# Patient Record
Sex: MALE | Race: WHITE | HISPANIC OR LATINO | Employment: FULL TIME | URBAN - METROPOLITAN AREA
[De-identification: names, ages, dates, MRNs, and addresses within clinical notes are randomized per-mention and may not be internally consistent; named-entity substitution may affect disease eponyms.]

---

## 2017-02-12 ENCOUNTER — HOSPITAL ENCOUNTER (INPATIENT)
Facility: HOSPITAL | Age: 22
LOS: 2 days | Discharge: HOME | End: 2017-02-14
Attending: EMERGENCY MEDICINE | Admitting: INTERNAL MEDICINE

## 2017-02-12 ENCOUNTER — APPOINTMENT (OUTPATIENT)
Dept: CT IMAGING | Facility: HOSPITAL | Age: 22
End: 2017-02-12

## 2017-02-12 DIAGNOSIS — B37.42 CANDIDAL BALANITIS: ICD-10-CM

## 2017-02-12 DIAGNOSIS — E11.65 TYPE 2 DIABETES MELLITUS WITH HYPERGLYCEMIA, WITHOUT LONG-TERM CURRENT USE OF INSULIN (HCC): Primary | ICD-10-CM

## 2017-02-12 DIAGNOSIS — N39.0 URINARY TRACT INFECTION WITHOUT HEMATURIA, SITE UNSPECIFIED: ICD-10-CM

## 2017-02-12 DIAGNOSIS — N47.1 PHIMOSIS: ICD-10-CM

## 2017-02-12 DIAGNOSIS — I88.9 INGUINAL LYMPHADENITIS: ICD-10-CM

## 2017-02-12 DIAGNOSIS — L03.90 CELLULITIS, UNSPECIFIED CELLULITIS SITE: ICD-10-CM

## 2017-02-12 PROBLEM — N30.00 ACUTE CYSTITIS WITHOUT HEMATURIA: Status: ACTIVE | Noted: 2017-02-12

## 2017-02-12 PROBLEM — N48.1 BALANITIS: Status: ACTIVE | Noted: 2017-02-12

## 2017-02-12 PROBLEM — Z91.199 MEDICALLY NONCOMPLIANT: Status: ACTIVE | Noted: 2017-02-12

## 2017-02-12 LAB
ALBUMIN SERPL-MCNC: 4.5 G/DL (ref 3.2–4.8)
ALBUMIN/GLOB SERPL: 1.4 G/DL (ref 1.5–2.5)
ALP SERPL-CCNC: 104 U/L (ref 25–100)
ALT SERPL W P-5'-P-CCNC: 31 U/L (ref 7–40)
ANION GAP SERPL CALCULATED.3IONS-SCNC: 5 MMOL/L (ref 3–11)
AST SERPL-CCNC: 21 U/L (ref 0–33)
BACTERIA UR QL AUTO: ABNORMAL /HPF
BASOPHILS # BLD AUTO: 0.01 10*3/MM3 (ref 0–0.2)
BASOPHILS NFR BLD AUTO: 0.3 % (ref 0–1)
BILIRUB SERPL-MCNC: 0.5 MG/DL (ref 0.3–1.2)
BILIRUB UR QL STRIP: NEGATIVE
BUN BLD-MCNC: 11 MG/DL (ref 9–23)
BUN/CREAT SERPL: 15.7 (ref 7–25)
CALCIUM SPEC-SCNC: 10.6 MG/DL (ref 8.7–10.4)
CHLORIDE SERPL-SCNC: 97 MMOL/L (ref 99–109)
CLARITY UR: CLEAR
CO2 SERPL-SCNC: 33 MMOL/L (ref 20–31)
COLOR UR: YELLOW
CREAT BLD-MCNC: 0.7 MG/DL (ref 0.6–1.3)
DEPRECATED RDW RBC AUTO: 42.3 FL (ref 37–54)
EOSINOPHIL # BLD AUTO: 0.01 10*3/MM3 (ref 0.1–0.3)
EOSINOPHIL NFR BLD AUTO: 0.3 % (ref 0–3)
ERYTHROCYTE [DISTWIDTH] IN BLOOD BY AUTOMATED COUNT: 13 % (ref 11.3–14.5)
GFR SERPL CREATININE-BSD FRML MDRD: 144 ML/MIN/1.73
GLOBULIN UR ELPH-MCNC: 3.3 GM/DL
GLUCOSE BLD-MCNC: 461 MG/DL (ref 70–100)
GLUCOSE BLDC GLUCOMTR-MCNC: 103 MG/DL (ref 70–130)
GLUCOSE BLDC GLUCOMTR-MCNC: 113 MG/DL (ref 70–130)
GLUCOSE BLDC GLUCOMTR-MCNC: 190 MG/DL (ref 70–130)
GLUCOSE BLDC GLUCOMTR-MCNC: 193 MG/DL (ref 70–130)
GLUCOSE BLDC GLUCOMTR-MCNC: 224 MG/DL (ref 70–130)
GLUCOSE BLDC GLUCOMTR-MCNC: 248 MG/DL (ref 70–130)
GLUCOSE BLDC GLUCOMTR-MCNC: 288 MG/DL (ref 70–130)
GLUCOSE UR STRIP-MCNC: ABNORMAL MG/DL
HCT VFR BLD AUTO: 41.2 % (ref 38.9–50.9)
HGB BLD-MCNC: 14.3 G/DL (ref 13.1–17.5)
HGB UR QL STRIP.AUTO: ABNORMAL
HYALINE CASTS UR QL AUTO: ABNORMAL /LPF
IMM GRANULOCYTES # BLD: 0.03 10*3/MM3 (ref 0–0.03)
IMM GRANULOCYTES NFR BLD: 1 % (ref 0–0.6)
KETONES UR QL STRIP: ABNORMAL
LEUKOCYTE ESTERASE UR QL STRIP.AUTO: ABNORMAL
LYMPHOCYTES # BLD AUTO: 0.96 10*3/MM3 (ref 0.6–4.8)
LYMPHOCYTES NFR BLD AUTO: 31.5 % (ref 24–44)
MCH RBC QN AUTO: 31.4 PG (ref 27–31)
MCHC RBC AUTO-ENTMCNC: 34.7 G/DL (ref 32–36)
MCV RBC AUTO: 90.4 FL (ref 80–99)
MONOCYTES # BLD AUTO: 0.46 10*3/MM3 (ref 0–1)
MONOCYTES NFR BLD AUTO: 15.1 % (ref 0–12)
NEUTROPHILS # BLD AUTO: 1.58 10*3/MM3 (ref 1.5–8.3)
NEUTROPHILS NFR BLD AUTO: 51.8 % (ref 41–71)
NITRITE UR QL STRIP: NEGATIVE
PH UR STRIP.AUTO: 6.5 [PH] (ref 5–8)
PLATELET # BLD AUTO: 184 10*3/MM3 (ref 150–450)
PMV BLD AUTO: 10.5 FL (ref 6–12)
POTASSIUM BLD-SCNC: 4.3 MMOL/L (ref 3.5–5.5)
PROT SERPL-MCNC: 7.8 G/DL (ref 5.7–8.2)
PROT UR QL STRIP: NEGATIVE
RBC # BLD AUTO: 4.56 10*6/MM3 (ref 4.2–5.76)
RBC # UR: ABNORMAL /HPF
REF LAB TEST METHOD: ABNORMAL
SODIUM BLD-SCNC: 135 MMOL/L (ref 132–146)
SP GR UR STRIP: 1.01 (ref 1–1.03)
SQUAMOUS #/AREA URNS HPF: ABNORMAL /HPF
UROBILINOGEN UR QL STRIP: ABNORMAL
WBC NRBC COR # BLD: 3.05 10*3/MM3 (ref 4.5–13.5)
WBC UR QL AUTO: ABNORMAL /HPF

## 2017-02-12 PROCEDURE — 87077 CULTURE AEROBIC IDENTIFY: CPT | Performed by: PHYSICIAN ASSISTANT

## 2017-02-12 PROCEDURE — 87491 CHLMYD TRACH DNA AMP PROBE: CPT | Performed by: PHYSICIAN ASSISTANT

## 2017-02-12 PROCEDURE — 87086 URINE CULTURE/COLONY COUNT: CPT | Performed by: PHYSICIAN ASSISTANT

## 2017-02-12 PROCEDURE — 87070 CULTURE OTHR SPECIMN AEROBIC: CPT | Performed by: PHYSICIAN ASSISTANT

## 2017-02-12 PROCEDURE — 99223 1ST HOSP IP/OBS HIGH 75: CPT | Performed by: INTERNAL MEDICINE

## 2017-02-12 PROCEDURE — 85025 COMPLETE CBC W/AUTO DIFF WBC: CPT | Performed by: PHYSICIAN ASSISTANT

## 2017-02-12 PROCEDURE — 87147 CULTURE TYPE IMMUNOLOGIC: CPT | Performed by: PHYSICIAN ASSISTANT

## 2017-02-12 PROCEDURE — 87186 SC STD MICRODIL/AGAR DIL: CPT | Performed by: PHYSICIAN ASSISTANT

## 2017-02-12 PROCEDURE — 25010000002 PIPERACILLIN-TAZOBACTAM: Performed by: EMERGENCY MEDICINE

## 2017-02-12 PROCEDURE — 81001 URINALYSIS AUTO W/SCOPE: CPT | Performed by: PHYSICIAN ASSISTANT

## 2017-02-12 PROCEDURE — 80053 COMPREHEN METABOLIC PANEL: CPT | Performed by: PHYSICIAN ASSISTANT

## 2017-02-12 PROCEDURE — 99284 EMERGENCY DEPT VISIT MOD MDM: CPT

## 2017-02-12 PROCEDURE — 63710000001 INSULIN DETEMIR PER 5 UNITS: Performed by: NURSE PRACTITIONER

## 2017-02-12 PROCEDURE — 25010000002 INSULIN REGULAR HUMAN PER 5 UNITS: Performed by: PHYSICIAN ASSISTANT

## 2017-02-12 PROCEDURE — 82962 GLUCOSE BLOOD TEST: CPT

## 2017-02-12 PROCEDURE — 87590 N.GONORRHOEAE DNA DIR PROB: CPT | Performed by: PHYSICIAN ASSISTANT

## 2017-02-12 PROCEDURE — 72193 CT PELVIS W/DYE: CPT

## 2017-02-12 PROCEDURE — 63710000001 INSULIN LISPRO (HUMAN) PER 5 UNITS: Performed by: NURSE PRACTITIONER

## 2017-02-12 PROCEDURE — 25010000002 PIPERACILLIN SOD-TAZOBACTAM PER 1 G: Performed by: NURSE PRACTITIONER

## 2017-02-12 PROCEDURE — 63710000001 INSULIN REGULAR HUMAN PER 5 UNITS: Performed by: PHYSICIAN ASSISTANT

## 2017-02-12 PROCEDURE — 25010000002 VANCOMYCIN HCL IN NACL 1.5-0.9 GM/500ML-% SOLUTION: Performed by: PHYSICIAN ASSISTANT

## 2017-02-12 PROCEDURE — 0 IOPAMIDOL 61 % SOLUTION: Performed by: EMERGENCY MEDICINE

## 2017-02-12 PROCEDURE — 87205 SMEAR GRAM STAIN: CPT | Performed by: PHYSICIAN ASSISTANT

## 2017-02-12 RX ORDER — HYDROCODONE BITARTRATE AND ACETAMINOPHEN 5; 325 MG/1; MG/1
1 TABLET ORAL EVERY 8 HOURS PRN
Status: DISCONTINUED | OUTPATIENT
Start: 2017-02-12 | End: 2017-02-14 | Stop reason: HOSPADM

## 2017-02-12 RX ORDER — VANCOMYCIN/0.9 % SOD CHLORIDE 1.5G/250ML
20 PLASTIC BAG, INJECTION (ML) INTRAVENOUS ONCE
Status: COMPLETED | OUTPATIENT
Start: 2017-02-12 | End: 2017-02-12

## 2017-02-12 RX ORDER — SACCHAROMYCES BOULARDII 250 MG
250 CAPSULE ORAL 2 TIMES DAILY
Status: DISCONTINUED | OUTPATIENT
Start: 2017-02-12 | End: 2017-02-14 | Stop reason: HOSPADM

## 2017-02-12 RX ORDER — SODIUM CHLORIDE 0.9 % (FLUSH) 0.9 %
1-10 SYRINGE (ML) INJECTION AS NEEDED
Status: DISCONTINUED | OUTPATIENT
Start: 2017-02-12 | End: 2017-02-14 | Stop reason: HOSPADM

## 2017-02-12 RX ORDER — NICOTINE POLACRILEX 4 MG
15 LOZENGE BUCCAL
Status: DISCONTINUED | OUTPATIENT
Start: 2017-02-12 | End: 2017-02-14 | Stop reason: HOSPADM

## 2017-02-12 RX ORDER — VANCOMYCIN HYDROCHLORIDE
1250 EVERY 12 HOURS
Status: COMPLETED | OUTPATIENT
Start: 2017-02-13 | End: 2017-02-13

## 2017-02-12 RX ORDER — CEPHALEXIN 500 MG/1
1000 CAPSULE ORAL 2 TIMES DAILY
COMMUNITY
Start: 2017-02-10 | End: 2017-02-14 | Stop reason: HOSPADM

## 2017-02-12 RX ORDER — SODIUM CHLORIDE 9 MG/ML
100 INJECTION, SOLUTION INTRAVENOUS CONTINUOUS
Status: DISCONTINUED | OUTPATIENT
Start: 2017-02-12 | End: 2017-02-14 | Stop reason: HOSPADM

## 2017-02-12 RX ORDER — SODIUM CHLORIDE 9 MG/ML
30 INJECTION, SOLUTION INTRAVENOUS CONTINUOUS PRN
Status: DISCONTINUED | OUTPATIENT
Start: 2017-02-12 | End: 2017-02-14 | Stop reason: HOSPADM

## 2017-02-12 RX ORDER — SODIUM CHLORIDE 0.9 % (FLUSH) 0.9 %
10 SYRINGE (ML) INJECTION AS NEEDED
Status: DISCONTINUED | OUTPATIENT
Start: 2017-02-12 | End: 2017-02-14 | Stop reason: HOSPADM

## 2017-02-12 RX ORDER — ONDANSETRON 4 MG/1
4 TABLET, FILM COATED ORAL EVERY 6 HOURS PRN
Status: DISCONTINUED | OUTPATIENT
Start: 2017-02-12 | End: 2017-02-14 | Stop reason: HOSPADM

## 2017-02-12 RX ORDER — ACETAMINOPHEN 325 MG/1
650 TABLET ORAL EVERY 4 HOURS PRN
Status: DISCONTINUED | OUTPATIENT
Start: 2017-02-12 | End: 2017-02-14 | Stop reason: HOSPADM

## 2017-02-12 RX ORDER — DOCUSATE SODIUM 100 MG/1
100 CAPSULE, LIQUID FILLED ORAL 2 TIMES DAILY
Status: DISCONTINUED | OUTPATIENT
Start: 2017-02-12 | End: 2017-02-14 | Stop reason: HOSPADM

## 2017-02-12 RX ORDER — CEFTRIAXONE SODIUM 1 G/50ML
1 INJECTION, SOLUTION INTRAVENOUS ONCE
Status: DISCONTINUED | OUTPATIENT
Start: 2017-02-12 | End: 2017-02-12

## 2017-02-12 RX ORDER — DEXTROSE MONOHYDRATE 25 G/50ML
25 INJECTION, SOLUTION INTRAVENOUS
Status: DISCONTINUED | OUTPATIENT
Start: 2017-02-12 | End: 2017-02-14 | Stop reason: HOSPADM

## 2017-02-12 RX ORDER — ONDANSETRON 2 MG/ML
4 INJECTION INTRAMUSCULAR; INTRAVENOUS EVERY 6 HOURS PRN
Status: DISCONTINUED | OUTPATIENT
Start: 2017-02-12 | End: 2017-02-14 | Stop reason: HOSPADM

## 2017-02-12 RX ORDER — SODIUM CHLORIDE 0.9 % (FLUSH) 0.9 %
30 SYRINGE (ML) INJECTION ONCE AS NEEDED
Status: DISCONTINUED | OUTPATIENT
Start: 2017-02-12 | End: 2017-02-14 | Stop reason: HOSPADM

## 2017-02-12 RX ORDER — CLOTRIMAZOLE 1 %
1 CREAM (GRAM) TOPICAL 3 TIMES DAILY
COMMUNITY
End: 2017-02-14 | Stop reason: HOSPADM

## 2017-02-12 RX ORDER — HYDROCODONE BITARTRATE AND ACETAMINOPHEN 5; 325 MG/1; MG/1
1 TABLET ORAL EVERY 4 HOURS PRN
Status: DISCONTINUED | OUTPATIENT
Start: 2017-02-12 | End: 2017-02-12

## 2017-02-12 RX ORDER — FLUCONAZOLE 100 MG/1
100 TABLET ORAL EVERY 24 HOURS
Status: DISCONTINUED | OUTPATIENT
Start: 2017-02-12 | End: 2017-02-12 | Stop reason: SDUPTHER

## 2017-02-12 RX ORDER — FLUCONAZOLE 100 MG/1
100 TABLET ORAL EVERY 24 HOURS
Status: DISCONTINUED | OUTPATIENT
Start: 2017-02-12 | End: 2017-02-14

## 2017-02-12 RX ADMIN — VANCOMYCIN HYDROCHLORIDE 1500 MG: 1 INJECTION, POWDER, LYOPHILIZED, FOR SOLUTION INTRAVENOUS at 18:10

## 2017-02-12 RX ADMIN — INSULIN LISPRO 2 UNITS: 100 INJECTION, SOLUTION INTRAVENOUS; SUBCUTANEOUS at 22:02

## 2017-02-12 RX ADMIN — INSULIN HUMAN 6 UNITS: 100 INJECTION, SOLUTION PARENTERAL at 13:50

## 2017-02-12 RX ADMIN — TAZOBACTAM SODIUM AND PIPERACILLIN SODIUM 3.38 G: 375; 3 INJECTION, SOLUTION INTRAVENOUS at 20:27

## 2017-02-12 RX ADMIN — SODIUM CHLORIDE 1000 ML: 9 INJECTION, SOLUTION INTRAVENOUS at 13:47

## 2017-02-12 RX ADMIN — FLUCONAZOLE 100 MG: 100 TABLET ORAL at 16:00

## 2017-02-12 RX ADMIN — Medication 250 MG: at 17:21

## 2017-02-12 RX ADMIN — INSULIN DETEMIR 10 UNITS: 100 INJECTION, SOLUTION SUBCUTANEOUS at 17:21

## 2017-02-12 RX ADMIN — TAZOBACTAM SODIUM AND PIPERACILLIN SODIUM 4.5 G: .5; 4 INJECTION, POWDER, LYOPHILIZED, FOR SOLUTION INTRAVENOUS at 14:26

## 2017-02-12 RX ADMIN — IOPAMIDOL 85 ML: 612 INJECTION, SOLUTION INTRAVENOUS at 13:41

## 2017-02-12 RX ADMIN — DOCUSATE SODIUM 100 MG: 100 CAPSULE, LIQUID FILLED ORAL at 17:21

## 2017-02-12 RX ADMIN — FLUCONAZOLE 100 MG: 100 TABLET ORAL at 17:21

## 2017-02-12 RX ADMIN — SODIUM CHLORIDE 3 UNITS/HR: 9 INJECTION, SOLUTION INTRAVENOUS at 13:58

## 2017-02-12 RX ADMIN — SODIUM CHLORIDE 100 ML/HR: 9 INJECTION, SOLUTION INTRAVENOUS at 18:11

## 2017-02-12 NOTE — PROGRESS NOTES
Pharmacy Consult--Antimicrobial Dosing  Pt is a 20 yo male that pharmacy has been consulted to dose vancomycin secondary to skin and soft tissue infection.      Indication: SSTI  Consulting Provider: Tima RAMIRES    Current Antimicrobial Therapy  1. Vancomycin: PTD  2. Zosyn: 3.375g every 6 hours     Allergies  Review of patient's allergies indicates no known allergies.    Labs      Results from last 7 days     Lab Units 02/12/17  1152   SODIUM mmol/L 135   POTASSIUM mmol/L 4.3   CHLORIDE mmol/L 97*   TOTAL CO2 mmol/L 33.0*   BUN mg/dL 11   CREATININE mg/dL 0.70   CALCIUM mg/dL 10.6*   BILIRUBIN mg/dL 0.5   ALK PHOS U/L 104*   ALT (SGPT) U/L 31   AST (SGOT) U/L 21   GLUCOSE mg/dL 461*       Results from last 7 days     Lab Units 02/12/17  1152   WBC 10*3/mm3 3.05*   HEMOGLOBIN g/dL 14.3   HEMATOCRIT % 41.2   PLATELETS 10*3/mm3 184     [unfilled]    Evaluation of Dosing  Weight: 79.8kg  Last Dose Received in the ED/Outside Facility: 1500mg load  Goal Trough: 10-15    Estimated Creatinine Clearance: 179.3 mL/min (by C-G formula based on Cr of 0.7).       [unfilled]    Microbiology and Radiology  Cultures Pending    Evaluation of Level  Vancomycin trough scheduled for prior to the fourth dose.      Assessment  Renal function at baseline with SCr at 0.7. No documented UOP at this time.      Plan:  1. Start vancomycin 1250mg (~15mg/kg) every 12 hours.   2. Vancomycin trough scheduled for 0530 on 2/14 prior to the fourth dose.   3. Monitor for s/sx of nephrotoxicity.  4. Pharmacy will continue to monitor and adjust vancomycin dose as necessary based on renal function, cultures, labs, and clinical status.     Joaquim Lopez, PharmD  2/12/2017  5:35 PM

## 2017-02-12 NOTE — PROGRESS NOTES
Discharge Planning Assessment  [facility]     Patient Name: Ford Mukherjee Jr.  MRN: [id number]  Today's Date: 2/12/2017    Admit Date: 2/12/2017          Discharge Needs Assessment       02/12/17 1511    Living Environment    Living Arrangements house    Provides Primary Care For no one      02/12/17 1505    Living Environment    Lives With parent(s)    Living Arrangements --   Lives in 1 level home in [place], [place] c his parents.  Supportive family.    Quality Of Family Relationships supportive    Able to Return to Prior Living Arrangements yes    Discharge Needs Assessment    Concerns To Be Addressed denies needs/concerns at this time    Readmission Within The Last 30 Days no previous admission in last 30 days    Anticipated Changes Related to Illness none    Equipment Currently Used at Home none    Equipment Needed After Discharge none    Transportation Available car;family or friend will provide            Discharge Plan       02/12/17 1509    Case Management/Social Work Plan    Plan Home c parents    Patient/Family In Agreement With Plan yes    Additional Comments Mr. Youngblood lives c his parents in a 1 story home in [place].  He is independent c ADL's.  He has never used HH, Home O2 or DME.  His BC/BS is active.  His goal is to return home c assist from his family.          Discharge Placement     No information found                Demographic Summary       02/12/17 1447    Referral Information    Admission Type inpatient    Primary Care Physician Information    Name Dr. Beulah Peralta      02/12/17 1442    Referral Information    Referral Source emergency department    Reason For Consult discharge planning    Contact Information    Permission Granted to Share Information With             Functional Status       02/12/17 1504    Functional Status Current    Ambulation 0-->independent    Transferring 0-->independent    Toileting 0-->independent    Bathing 0-->independent    Dressing  0-->independent    Eating 0-->independent    Communication 0-->understands/communicates without difficulty    Swallowing (if score 2 or more for any item, consult Rehab Services) 0-->swallows foods/liquids without difficulty    Change in Functional Status Since Onset of Current Illness/Injury no      02/12/17 1443    Functional Status Prior    Ambulation 0-->independent    Transferring 0-->independent    Toileting 0-->independent    Bathing 0-->independent    Dressing 0-->independent    Eating 0-->independent    Communication 0-->understands/communicates without difficulty    Swallowing 0-->swallows foods/liquids without difficulty    IADL    Medications independent    Meal Preparation independent    Housekeeping independent    Laundry independent    Shopping independent    Oral Care independent            Psychosocial     None            Abuse/Neglect     None            Legal     None            Substance Abuse     None            Patient Forms     None          Jamison Merrill RN

## 2017-02-12 NOTE — H&P
Hospital Medicine History and Physical    Primary Care Physician: Rich Peralta MD    Chief complaint: penile swelling/ pain    Subjective     History of Present Illness  Mr. Mukherjee is a 21 year old Pakistani type 2 diabetic who has been noncompliant over the last 3 months, not taking medication daily or checking blood sugars.  Patient states he does have history of candidal balanitis.  Noticed swelling to foreskin and slight stinging with urination approximately 1 week ago.  He started having inability to retract foreskin of penis and presented to [facility] for fungal infection.  States symptoms he started with were much like previous candidal infection.  He was given 3 days Diflucan which he took 2/8/2017 through 2/10/2017.  Patient states he had no improvement in symptoms and went back to [facility] for further evaluation.  He was told to monitor and if symptoms worsened to go to ER.  Overnight, patient states he developed acute swelling in the left side of his groin.  He has tenderness to the area and has spread over suprapubic region.  Patient is able to urinate without difficulty, but states has burning.  Denies any hematuria, back or flank pain.  Patient denies any nausea, vomiting, fevers.  Patient states he has had no change in foreskin swelling, redness, inability to retract skin since 2/7/2017, despite outpatient treatment.  Only change from this past week is groin swelling which occurred acutely overnight.    Shouldn't states he is tried to clean area and retract foreskin with unsuccessful attempts.  Patient states is very painful if he tries to retract foreskin, however no pain if he leaves it alone.  Patient's last sexual contact was approximately 2 weeks ago (one week prior to swelling and pain).  Patient states unsure of sexual contact's history and protection was not used.  Patient will be admitted to hospitalist service for uncontrolled type 2 diabetes and continued IV antibiotics, with failed outpatient  "treatment for balanitis.  Review of Systems   Constitutional: Negative for activity change, chills, fatigue and fever.   HENT: Negative.    Eyes: Negative.    Cardiovascular: Negative.    Gastrointestinal: Negative.    Endocrine: Positive for polydipsia and polyuria.   Genitourinary: Positive for discharge, dysuria, penile pain and penile swelling. Negative for flank pain, genital sores, hematuria, scrotal swelling and testicular pain.   Musculoskeletal: Negative for back pain.   Skin: Negative.    Neurological: Negative.    Psychiatric/Behavioral: Negative.       Otherwise complete ROS is negative except as mentioned in the HPI.    Past Medical History:   Past Medical History   Diagnosis Date   • Diabetes mellitus        Past Surgical History:History reviewed. No pertinent past surgical history.    Family History: family history includes Diabetes in his mother; No Known Problems in his father.     Social History:  reports that he has quit smoking. He does not have any smokeless tobacco history on file. He reports that he drinks alcohol. He reports that he does not use illicit drugs.    Medications:    (Not in a hospital admission)  No Known Allergies    Objective    Physical Exam:   Vital Signs:   Visit Vitals   • /86 (BP Location: Left arm, Patient Position: Lying)   • Pulse 82   • Temp 98.5 °F (36.9 °C) (Oral)   • Resp 16   • Ht 71\" (180.3 cm)   • Wt 176 lb (79.8 kg)   • SpO2 97%   • BMI 24.55 kg/m2     Physical Exam   Constitutional: He is oriented to person, place, and time. He appears well-developed and well-nourished. No distress.   HENT:   Head: Normocephalic and atraumatic.   Eyes: No scleral icterus.   Neck: Normal range of motion. No JVD present.   Cardiovascular: Normal rate, regular rhythm, normal heart sounds and intact distal pulses.    No murmur heard.  Pulmonary/Chest: Breath sounds normal. No respiratory distress. He has no wheezes.   Abdominal: Soft. Bowel sounds are normal. He exhibits no " distension. There is no tenderness.   Negative cva tenderness   Genitourinary: Penile tenderness present.   Genitourinary Comments: Swelling and redness prepuce. Foreskin swollen/ erythmatous, unable to retract with thin white discharge.  Left inguinal node swelling into left suprapubic region- firmness noted to left suprapubic region extending slightly over midline- no suprapubic tenderness   Neurological: He is alert and oriented to person, place, and time.   Skin: Skin is warm and dry.   Psychiatric: He has a normal mood and affect. His behavior is normal. Judgment and thought content normal.         Results Reviewed:  I have personally reviewed current lab, radiology, and data and agree.    Results from last 7 days  Lab Units 02/12/17  1152   WBC 10*3/mm3 3.05*   HEMOGLOBIN g/dL 14.3   PLATELETS 10*3/mm3 184       Results from last 7 days  Lab Units 02/12/17  1152   SODIUM mmol/L 135   POTASSIUM mmol/L 4.3   TOTAL CO2 mmol/L 33.0*   CREATININE mg/dL 0.70   GLUCOSE mg/dL 461*   CALCIUM mg/dL 10.6*           Assessment/Plan   Assessment & Plan    Balanitis with creamy discharge.  Patient also has history of fungal infection.      Phimosis      Inguinal lymphadenitis, secondary to above.      Type 2 diabetes mellitus with hyperglycemia, without long-term current use of insulin.      Medically noncompliant        Acute cystitis without hematuria        Plan:  21 year old Patient with uncontrolled T2 DM, who has not checked fsbs or taken DM medications since Novemeber 2016. Presents with 1 week h/o of left groin swelling and inability to retract foreskin of penis. Patient was seen at [facility] on 2/8 and prescribed 3 days of diflucan. Patient has some dyuria and increasing redness to penis post treatment.    -- wound cx, urine cx pending- follow  -- check GC Chlamydia  -- continue vanc, zosyn, diflucan  -- repeat am labs with a1c, tsh  -- prn pain medications  -- consider urology consult if no improvement  -- one time dose  levemir now, start sq insulin iform with ssi and qid fsbg      I discussed the patients findings and my recommendations with:IKE Joy 02/12/17 3:06 PM     Patient seen and examined at the bedside in the ER.  Patient is a 21-year-old male with history of the diabetes mellitus probably type II, diagnosed about a year ago.  He is a very poor historian and it seems that and he was put on oral medication for short while and now he supposed to be on insulin, again the details of it is not clear to me.  He comes to the emergency complaining about irritation, pain, discharge from glans penis.  According to the patient he was recently treated for fungal infection of the foreskin.  Relates to me that he has not taken his diabetic medication.  At the emergency room his blood sugar is above 460.  We will admit the patient and start him on IV antibiotics.  We will also start him on Lantus and sliding scale Humalog to control his blood sugar.  We will also ask ID to see him.  We will check hemoglobin A1c and other labs.  Culture has been taken at the emergency room and we will adjust treatment according to the results.  I talked to the patient and his family were present at the bedside in details.  I explained to findings and plan of care to them.  I've also discussed this case with IKE Stafford.  I have reviewed/edited the above to reflect my medical opinion.  This patient requires IV antibiotics and in-hospital workup and hence will be admitted as inpatient.

## 2017-02-12 NOTE — PLAN OF CARE
Problem: Diabetes, Type 1 (Adult)  Intervention: Support/Optimize Psychosocial Response to Condition    02/12/17 1835   Coping Strategies   Family/Support System Care self-care encouraged       Intervention: Monitor/Manage Signs of Ketoacidosis    02/12/17 1835   Nutrition Interventions   Fluid/Electrolyte Management fluids provided       Intervention: Optimize Glycemic Control    02/12/17 1835   Nutrition Interventions   Glycemic Management carbohydrate replacement provided;insulin infusion adjusted           Problem: Patient Care Overview (Adult)  Goal: Plan of Care Review  Outcome: Ongoing (interventions implemented as appropriate)    02/12/17 1800   Coping/Psychosocial Response Interventions   Plan Of Care Reviewed With patient       Goal: Adult Individualization and Mutuality  Outcome: Ongoing (interventions implemented as appropriate)    02/12/17 1835   Individualization   Patient Specific Goals having his blood glucose under control       Goal: Discharge Needs Assessment  Outcome: Ongoing (interventions implemented as appropriate)    02/12/17 1505 02/12/17 1652 02/12/17 1656   Discharge Needs Assessment   Concerns To Be Addressed denies needs/concerns at this time --  --    Readmission Within The Last 30 Days no previous admission in last 30 days --  --    Equipment Needed After Discharge none --  --    Current Health   Anticipated Changes Related to Illness none --  --    Living Environment   Transportation Available --  --  car   Self-Care   Equipment Currently Used at Home --  none --

## 2017-02-12 NOTE — ED PROVIDER NOTES
Subjective   HPI Comments: 21-year-old male presents for evaluation of left groin pain and swelling.  He also notes that the skin of his uncircumcised penis is swollen and he is unable to retract it.  The patient is a diabetic.  He has not checked his blood sugar in a long time.  He was seen in urgent treatment center last week for the same symptoms and given a prescription for 3 Diflucan tablets.  He has had no improvement.  He has no PCP.  No history of STDs.  No prior history of abscesses.    Patient is a 21 y.o. male presenting with abscess.   History provided by:  Patient  Abscess   Abscess location: left groin.  Abscess quality: painful and redness    Abscess quality: not draining    Duration:  4 days  Progression:  Worsening  Pain details:     Quality:  Dull    Severity:  Moderate    Timing:  Constant    Progression:  Worsening  Chronicity:  New  Context: diabetes    Relieved by:  Nothing  Worsened by:  Nothing  Ineffective treatments: no relief with Diflucan.  Associated symptoms: no fever, no headaches, no nausea and no vomiting    Risk factors: no hx of MRSA and no prior abscess        Review of Systems   Constitutional: Negative for chills and fever.   HENT: Negative for congestion, ear pain, nosebleeds, rhinorrhea and sore throat.    Eyes: Negative for pain, discharge and visual disturbance.   Respiratory: Negative for shortness of breath and wheezing.    Cardiovascular: Negative for chest pain, palpitations and leg swelling.   Gastrointestinal: Negative for abdominal pain, blood in stool, diarrhea, nausea and vomiting.   Endocrine: Negative.    Genitourinary: Positive for discharge (mild), penile pain and penile swelling. Negative for dysuria, hematuria and urgency.   Musculoskeletal: Negative for arthralgias and back pain.   Skin: Negative for pallor.        Mild redness and swelling left groin     Allergic/Immunologic: Negative for immunocompromised state.   Neurological: Negative for dizziness,  speech difficulty, weakness and headaches.   Hematological: Negative for adenopathy. Does not bruise/bleed easily.   Psychiatric/Behavioral: Negative.        Past Medical History   Diagnosis Date   • Diabetes mellitus        No Known Allergies    History reviewed. No pertinent past surgical history.    History reviewed. No pertinent family history.    Social History     Social History   • Marital status: Single     Spouse name: N/A   • Number of children: N/A   • Years of education: N/A     Social History Main Topics   • Smoking status: Former Smoker   • Smokeless tobacco: None   • Alcohol use Yes      Comment: occasional   • Drug use: No   • Sexual activity: Not Asked     Other Topics Concern   • None     Social History Narrative   • None           Objective   Physical Exam   Constitutional: He is oriented to person, place, and time. He appears well-developed and well-nourished. No distress.   HENT:   Head: Normocephalic and atraumatic.   Nose: Nose normal.   Mouth/Throat: Oropharynx is clear and moist.   Eyes: EOM are normal. Pupils are equal, round, and reactive to light. Left eye exhibits no discharge. No scleral icterus.   Neck: Normal range of motion. Neck supple.   Cardiovascular: Normal rate, regular rhythm, normal heart sounds and intact distal pulses.    No murmur heard.  Pulmonary/Chest: Effort normal and breath sounds normal. No respiratory distress. He has no wheezes. He has no rales. He exhibits no tenderness.   Abdominal: Soft. Bowel sounds are normal. There is no tenderness.   Genitourinary:   Genitourinary Comments: Patient has marketed balanitis with drainage of thick yellow pus and has phimosis as result of his balanitis.  This appears to be fungal but may have secondary bacterial infection.  The patient also has left inguinal fullness and tenderness suggestive of reactive inflammatory lymph nodes.  There is mild redness of the overlying skin in the left groin.  He has several areas of mild  folliculitis in the pubic region.   Musculoskeletal: Normal range of motion. He exhibits no edema or tenderness.   Neurological: He is alert and oriented to person, place, and time.   Skin: Skin is warm and dry. He is not diaphoretic.   Psychiatric: He has a normal mood and affect.   Nursing note and vitals reviewed.      Procedures         ED Course  ED Course    The patient is hyperglycemic at 460.  He also has a UTI.  He has a balanitis, which is likely fungal and is causing a phimosis.  The patient was started on Zosyn and vancomycin.  He was also given Diflucan.  His CT of the pelvis with IV contrast is pending.  I spoke with Dr. Luis, who agrees to admit.                MDM    Final diagnoses:   Type 2 diabetes mellitus with hyperglycemia, without long-term current use of insulin   Urinary tract infection without hematuria, site unspecified   Candidal balanitis   Phimosis   Inguinal lymphadenitis   Cellulitis, unspecified cellulitis site            IDALMIS Das  02/12/17 1336       IDALMIS Das  02/12/17 1411

## 2017-02-13 LAB
ANION GAP SERPL CALCULATED.3IONS-SCNC: 6 MMOL/L (ref 3–11)
BUN BLD-MCNC: 9 MG/DL (ref 9–23)
BUN/CREAT SERPL: 18 (ref 7–25)
CALCIUM SPEC-SCNC: 9 MG/DL (ref 8.7–10.4)
CHLORIDE SERPL-SCNC: 105 MMOL/L (ref 99–109)
CO2 SERPL-SCNC: 26 MMOL/L (ref 20–31)
CREAT BLD-MCNC: 0.5 MG/DL (ref 0.6–1.3)
DEPRECATED RDW RBC AUTO: 43.1 FL (ref 37–54)
ERYTHROCYTE [DISTWIDTH] IN BLOOD BY AUTOMATED COUNT: 13.2 % (ref 11.3–14.5)
GFR SERPL CREATININE-BSD FRML MDRD: >150 ML/MIN/1.73
GLUCOSE BLD-MCNC: 176 MG/DL (ref 70–100)
GLUCOSE BLDC GLUCOMTR-MCNC: 171 MG/DL (ref 70–130)
GLUCOSE BLDC GLUCOMTR-MCNC: 214 MG/DL (ref 70–130)
GLUCOSE BLDC GLUCOMTR-MCNC: 215 MG/DL (ref 70–130)
GLUCOSE BLDC GLUCOMTR-MCNC: 247 MG/DL (ref 70–130)
HBA1C MFR BLD: 14.8 % (ref 4.8–5.6)
HCT VFR BLD AUTO: 37.6 % (ref 38.9–50.9)
HGB BLD-MCNC: 13.1 G/DL (ref 13.1–17.5)
MCH RBC QN AUTO: 31.4 PG (ref 27–31)
MCHC RBC AUTO-ENTMCNC: 34.8 G/DL (ref 32–36)
MCV RBC AUTO: 90.2 FL (ref 80–99)
PLATELET # BLD AUTO: 180 10*3/MM3 (ref 150–450)
PMV BLD AUTO: 10.1 FL (ref 6–12)
POTASSIUM BLD-SCNC: 3.5 MMOL/L (ref 3.5–5.5)
RBC # BLD AUTO: 4.17 10*6/MM3 (ref 4.2–5.76)
SODIUM BLD-SCNC: 137 MMOL/L (ref 132–146)
TSH SERPL DL<=0.05 MIU/L-ACNC: 1.12 MIU/ML (ref 0.35–5.35)
WBC NRBC COR # BLD: 3.31 10*3/MM3 (ref 4.5–13.5)

## 2017-02-13 PROCEDURE — 82962 GLUCOSE BLOOD TEST: CPT

## 2017-02-13 PROCEDURE — 63710000001 INSULIN DETEMIR PER 5 UNITS: Performed by: NURSE PRACTITIONER

## 2017-02-13 PROCEDURE — 25010000002 VANCOMYCIN HCL IN NACL 1.25-0.9 GM/250ML-% SOLUTION

## 2017-02-13 PROCEDURE — 25010000002 PIPERACILLIN SOD-TAZOBACTAM PER 1 G: Performed by: NURSE PRACTITIONER

## 2017-02-13 PROCEDURE — 85027 COMPLETE CBC AUTOMATED: CPT | Performed by: NURSE PRACTITIONER

## 2017-02-13 PROCEDURE — G0108 DIAB MANAGE TRN  PER INDIV: HCPCS

## 2017-02-13 PROCEDURE — 80048 BASIC METABOLIC PNL TOTAL CA: CPT | Performed by: NURSE PRACTITIONER

## 2017-02-13 PROCEDURE — 83036 HEMOGLOBIN GLYCOSYLATED A1C: CPT | Performed by: NURSE PRACTITIONER

## 2017-02-13 PROCEDURE — 99232 SBSQ HOSP IP/OBS MODERATE 35: CPT | Performed by: INTERNAL MEDICINE

## 2017-02-13 PROCEDURE — 84443 ASSAY THYROID STIM HORMONE: CPT | Performed by: NURSE PRACTITIONER

## 2017-02-13 RX ADMIN — TAZOBACTAM SODIUM AND PIPERACILLIN SODIUM 3.38 G: 375; 3 INJECTION, SOLUTION INTRAVENOUS at 07:43

## 2017-02-13 RX ADMIN — INSULIN LISPRO 4 UNITS: 100 INJECTION, SOLUTION INTRAVENOUS; SUBCUTANEOUS at 21:01

## 2017-02-13 RX ADMIN — TAZOBACTAM SODIUM AND PIPERACILLIN SODIUM 3.38 G: 375; 3 INJECTION, SOLUTION INTRAVENOUS at 19:12

## 2017-02-13 RX ADMIN — INSULIN LISPRO 4 UNITS: 100 INJECTION, SOLUTION INTRAVENOUS; SUBCUTANEOUS at 17:12

## 2017-02-13 RX ADMIN — FLUCONAZOLE 100 MG: 100 TABLET ORAL at 13:48

## 2017-02-13 RX ADMIN — VANCOMYCIN HYDROCHLORIDE 1250 MG: 1 INJECTION, POWDER, LYOPHILIZED, FOR SOLUTION INTRAVENOUS at 05:25

## 2017-02-13 RX ADMIN — INSULIN DETEMIR 10 UNITS: 100 INJECTION, SOLUTION SUBCUTANEOUS at 21:01

## 2017-02-13 RX ADMIN — Medication 250 MG: at 17:12

## 2017-02-13 RX ADMIN — VANCOMYCIN HYDROCHLORIDE 1250 MG: 1 INJECTION, POWDER, LYOPHILIZED, FOR SOLUTION INTRAVENOUS at 17:12

## 2017-02-13 RX ADMIN — SODIUM CHLORIDE 100 ML/HR: 9 INJECTION, SOLUTION INTRAVENOUS at 11:10

## 2017-02-13 RX ADMIN — DOCUSATE SODIUM 100 MG: 100 CAPSULE, LIQUID FILLED ORAL at 17:12

## 2017-02-13 RX ADMIN — TAZOBACTAM SODIUM AND PIPERACILLIN SODIUM 3.38 G: 375; 3 INJECTION, SOLUTION INTRAVENOUS at 13:48

## 2017-02-13 RX ADMIN — INSULIN LISPRO 4 UNITS: 100 INJECTION, SOLUTION INTRAVENOUS; SUBCUTANEOUS at 12:38

## 2017-02-13 RX ADMIN — Medication 250 MG: at 08:39

## 2017-02-13 RX ADMIN — INSULIN LISPRO 2 UNITS: 100 INJECTION, SOLUTION INTRAVENOUS; SUBCUTANEOUS at 08:39

## 2017-02-13 RX ADMIN — DOCUSATE SODIUM 100 MG: 100 CAPSULE, LIQUID FILLED ORAL at 08:39

## 2017-02-13 RX ADMIN — TAZOBACTAM SODIUM AND PIPERACILLIN SODIUM 3.38 G: 375; 3 INJECTION, SOLUTION INTRAVENOUS at 01:16

## 2017-02-13 NOTE — CONSULTS
INFECTIOUS DISEASE CONSULT/INITIAL HOSPITAL VISIT    Ford Mukherjee Jr.  1995  [id number]    Date of Consult: 2/13/2017    Admission Date: 2/12/2017      Requesting Provider: No ref. provider found  Evaluating Physician: Derrick Ma MD    Reason for Consultation: balanitis    History of present illness:    Patient is a 21 y.o. male who presented to the ED with complaints of penile swelling, inability to retract his foreskin, and burning with urination.   He was diagnosed with diabetes last May, and did not take his medications.  He states he has been treated for Yeast infections in the past.  He was seen at [facility] and given Fluconazole.  He was told if it failed to improve to present to the ED.  Yesterday , he noted left inguinal swelling and presented to the ED.  He has been afebrile.   Empiric Vancomycin and Zosyn, Fluconazole initiated on admission, and we were consulted for antibiotic management.      Past Medical History   Diagnosis Date   • Diabetes mellitus        History reviewed. No pertinent past surgical history.    Family History   Problem Relation Age of Onset   • Diabetes Mother    • No Known Problems Father        Social History     Social History   • Marital status: Single     Spouse name: N/A   • Number of children: N/A   • Years of education: N/A     Occupational History   • Not on file.     Social History Main Topics   • Smoking status: Former Smoker   • Smokeless tobacco: Not on file   • Alcohol use Yes      Comment: occasional   • Drug use: No   • Sexual activity: Yes     Partners: Female     Other Topics Concern   • Not on file     Social History Narrative   • No narrative on file       No Known Allergies      Medication:    Current Facility-Administered Medications:   •  acetaminophen (TYLENOL) tablet 650 mg, 650 mg, Oral, Q4H PRN, IKE Padgett  •  dextrose (D50W) solution 25 g, 25 g, Intravenous, Q15 Min PRN, Maira Alfred, APRN  •  dextrose (GLUTOSE) oral gel 15 g, 15 g,  Oral, Q15 Min PRN, Maira Alfred APRN  •  docusate sodium (COLACE) capsule 100 mg, 100 mg, Oral, BID, Maira Alfred APRN, 100 mg at 02/13/17 0839  •  fluconazole (DIFLUCAN) tablet 100 mg, 100 mg, Oral, Q24H, IDALMIS Das, 100 mg at 02/12/17 1721  •  glucagon (GLUCAGEN) injection 1 mg, 1 mg, Subcutaneous, Q15 Min PRN, Maira Alfred APRN  •  HYDROcodone-acetaminophen (NORCO) 5-325 MG per tablet 1 tablet, 1 tablet, Oral, Q8H PRN, Rajesh Luis MD  •  insulin detemir (LEVEMIR) injection 10 Units, 10 Units, Subcutaneous, Nightly, Maira Alfred APRN  •  insulin lispro (humaLOG) injection 0-9 Units, 0-9 Units, Subcutaneous, 4x Daily With Meals & Nightly, IKE Padgett, 4 Units at 02/13/17 1238  •  ondansetron (ZOFRAN) tablet 4 mg, 4 mg, Oral, Q6H PRN **OR** ondansetron (ZOFRAN) injection 4 mg, 4 mg, Intravenous, Q6H PRN, IKE Padgett  •  Pharmacy to dose vancomycin, , Does not apply, Continuous PRN, IKE Padgett  •  piperacillin-tazobactam (ZOSYN) 3.375 g in dextrose 50 mL IVPB (premix), 3.375 g, Intravenous, Q6H, Maira Alfred APRN, 3.375 g at 02/13/17 0743  •  saccharomyces boulardii (FLORASTOR) capsule 250 mg, 250 mg, Oral, BID, Maira Alfred APRN, 250 mg at 02/13/17 0839  •  sodium chloride 0.9 % flush 1-10 mL, 1-10 mL, Intravenous, PRN, Maira Alfred, APRN  •  Insert peripheral IV, , , Once **AND** sodium chloride 0.9 % flush 10 mL, 10 mL, Intravenous, PRN, IDALMIS Das  •  sodium chloride 0.9 % flush 30 mL, 30 mL, Intravenous, Once PRN, IDALMIS Das  •  sodium chloride 0.9 % infusion, 30 mL/hr, Intravenous, Continuous PRN, IDALMIS Das  •  sodium chloride 0.9 % infusion, 100 mL/hr, Intravenous, Continuous, Maira Alfred, IKE, Last Rate: 100 mL/hr at 02/13/17 1110, 100 mL/hr at 02/13/17 1110  •  vancomycin IVPB 1250 mg in 250 mL NS (premix), 1,250 mg, Intravenous, Q12H, Joaquim Lopez, [facility], 1,250 mg at 02/13/17  0525    Antibiotics:  IV Anti-Infectives     Ordered     Dose/Rate Route Frequency Start Stop    02/12/17 1817  vancomycin IVPB 1250 mg in 250 mL NS (premix)     Ordering Provider:  Joaquim Lopez RPH    1,250 mg  over 90 Minutes Intravenous Every 12 Hours 02/13/17 0600      02/12/17 1521  piperacillin-tazobactam (ZOSYN) 3.375 g in dextrose 50 mL IVPB (premix)     Ordering Provider:  IKE Padgett    3.375 g Intravenous Every 6 Hours 02/12/17 2000      02/12/17 1521  Pharmacy to dose vancomycin     Ordering Provider:  IKE Padgett     Does not apply Continuous PRN 02/12/17 1521      02/12/17 1403  piperacillin-tazobactam (ZOSYN) 4.5 g/100 mL 0.9% NS IVPB (mbp)     Ordering Provider:  Dejan Peoples MD    4.5 g  over 4 Hours Intravenous Once 02/12/17 1405 02/12/17 1826    02/12/17 1321  fluconazole (DIFLUCAN) tablet 100 mg     Ordering Provider:  IDALMIS Das    100 mg Oral Every 24 Hours 02/12/17 1400      02/12/17 1327  vancomycin IVPB 1500 mg in 0.9% NaCl (Premix) 500 mL     Ordering Provider:  IDALMIS Das    20 mg/kg × 79.8 kg  over 90 Minutes Intravenous Once 02/12/17 1329 02/12/17 1940            Review of Systems:  Constitutional-- No Fever, chills or sweats.  Appetite good, and no malaise. No fatigue.  HEENT-- No new vision, hearing or throat complaints.  No epistaxis or oral sores.  Denies odynophagia or dysphagia. No headache, photophobia or neck stiffness.  CV-- No chest pain, palpitation or syncope  Resp-- No SOB/cough/Hemoptysis  GI- No nausea, vomiting, or diarrhea.  No hematochezia, melena, or hematemesis. Denies jaundice or chronic liver disease.  --complains of penile swelling, discharge, inability to retract foreskin.    Lymph- left inguinal swelling, tenderness .   Heme- No active bruising or bleeding; no Hx of DVT or PE.  MS-- no swelling or pain in the bones or joints of arms/legs.  No new back pain.  Neuro-- No acute focal weakness or numbness in the arms or  legs.  No seizures.  Skin--No rashes or lesions      Physical Exam:   Vital Signs  Temp (24hrs), Av.6 °F (37 °C), Min:98.1 °F (36.7 °C), Max:99.7 °F (37.6 °C)    Temp  Min: 98.1 °F (36.7 °C)  Max: 99.7 °F (37.6 °C)  BP  Min: 114/73  Max: 139/88  Pulse  Min: 78  Max: 91  Resp  Min: 16  Max: 16  SpO2  Min: 97 %  Max: 98 %    GENERAL: Awake and alert, in no acute distress.   HEENT: Normocephalic, atraumatic.  PERRL. EOMI. No conjunctival injection. No icterus. Oropharynx clear without evidence of thrush or exudate. No evidence of peridontal disease.    NECK: Supple without nuchal rigidity. No mass.  LYMPH: No cervical, axillary or inguinal lymphadenopathy.  HEART: RRR; No murmur, rubs, gallops.   LUNGS: Clear to auscultation bilaterally without wheezing, rales, rhonchi. Normal respiratory effort. Nonlabored. No dullness.  ABDOMEN: Soft, nontender, nondistended. Positive bowel sounds. No rebound or guarding. NO mass or HSM.  EXT:  Left inguinal swelling , tenderness .  : penile swelling with creamy discharge noted.  He has left inguinal induration and tenderness without erythema.  MSK: FROM without joint effusions noted arms/legs.    SKIN: Warm and dry without cutaneous eruptions on Inspection/palpation.    NEURO: Oriented to PPT. No focal deficits on motor/sensory exam at arms/legs.  PSYCHIATRIC: Normal insight and judgement. Cooperative with PE    Laboratory Data      Results from last 7 days  Lab Units 17  0508 17  1152   WBC 10*3/mm3 3.31* 3.05*   HEMOGLOBIN g/dL 13.1 14.3   HEMATOCRIT % 37.6* 41.2   PLATELETS 10*3/mm3 180 184       Results from last 7 days  Lab Units 17  0508   SODIUM mmol/L 137   POTASSIUM mmol/L 3.5   CHLORIDE mmol/L 105   TOTAL CO2 mmol/L 26.0   BUN mg/dL 9   CREATININE mg/dL 0.50*   GLUCOSE mg/dL 176*   CALCIUM mg/dL 9.0       Results from last 7 days  Lab Units 17  1152   ALK PHOS U/L 104*   BILIRUBIN mg/dL 0.5   ALT (SGPT) U/L 31   AST (SGOT) U/L 21       UA TNTC  WBCS                   Estimated Creatinine Clearance: 251 mL/min (by C-G formula based on Cr of 0.5).      Microbiology:                    URINE CULTURE   Date Value Ref Range Status   02/12/2017 No growth  Preliminary     WOUND CULTURE   Date Value Ref Range Status   02/12/2017 Culture in progress  Preliminary           Radiology:  Imaging Results (last 72 hours)     Procedure Component Value Units Date/Time    CT Pelvis With Contrast [[id number]] Collected:  02/12/17 1555     Updated:  02/12/17 1555    Narrative:       EXAMINATION: CT PELVIS W CONTRAST     INDICATION: E11.65-Type 2 diabetes mellitus with hyperglycemia;  N39.0-Urinary tract infection, site not specified; B37.42-Candidal  balanitis; N47.1-Phimosis; I88.9-Nonspecific lymphadenitis, unspecified.  Left groin pain.     TECHNIQUE: Multiple axial CT imaging was obtained of the pelvis  following the administration of intravenous contrast.     The radiation dose reduction device was turned on for each scan per the  ALARA (As Low as Reasonably Achievable) protocol.     COMPARISON: NONE     FINDINGS: There is distention of the bladder. Pelvic portion pf the  gastrointestinal tract is within normal limits. Enlargement seen of the  lymph nodes in the left inguinal region with surrounding stranding in  the subcutaneous tissues and skin thickening suggesting a local  cellulitis. Bony structures are unremarkable. No fluid collection to  suggest abscess in the subcutaneous tissues. The muscles are intact.  There is no other pelvic adenopathy. No free fluid or free air. Appendix  is radiographically normal in appearance.       Impression:       Cellulitis identified in the left inguinal region with no  abscess collection present. Enlargement of left inguinal lymph nodes  suggesting reactive nodes.     DICTATED:     02/12/2017  EDITED:         02/12/2017         I read his CT scan.      Impression:   1.  Severe Balanitis/genital cellulitis-this is occurred in the  setting of poorly controlled diabetes mellitus.  I suspect that he initially developed a yeast balanitis followed by a bacterial cellulitis.  We will plan to continue him on intravenous vancomycin/Zosyn along with fluconazole.  I strongly encouraged him to work on his diabetic control.  We discussed the potential risk for development of Yael's gangrene if he continues to have uncontrolled diabetes.  2. T2 DM, poorly controlled, Hgb A1c of 14 on admission   3. UTI, culture negative  4. Left inguinal adenopathy/cellulitis      PLAN/RECOMMENDATIONS:   Thank you for asking us to see Ford Mukherjee Jr., I recommend the followin.  Follow wound culture  2.  Continue Vanc, Zosyn, Fluconazole for now      Dr. Ma has obtained the history, performed the physical exam and formulated the above treatment plan.        Derrick Ma MD  2017  12:55 PM

## 2017-02-13 NOTE — CONSULTS
"Diabetes Education  Assessment/Teaching    Patient Name:  Ford Mukherjee Jr.  YOB: 1995  MRN: [id number]  Admit Date:  2/12/2017      Assessment Date:  2/13/2017       Most Recent Value    General Information      Referral From:  MD order    Height  5' 11\" (1.803 m)    Height Method  Stated    Weight  176 lb (79.8 kg)    Pregnancy Assessment     Diabetes History     What type of diabetes do you have?  Type 2    Length of Diabetes Diagnosis  6 -12 months [Pt was dx in April, 2016.]    Current DM knowledge  fair    Do you have any diabetes complications?  recurring infections    Education Preferences     What areas of diabetes would you like to learn about?  avoiding high blood sugar, avoiding low blood sugar, diabetes complications, medications for diabetes, testing my blood sugar at home    Nutrition Information     Assessment Topics     Healthy Eating - Assessment  Needs education    Being Active - Assessment  Needs education    Taking Medication - Assessment  Needs education    Problem Solving - Assessment  Needs education    Reducing Risk - Assessment  Needs education    Healthy Coping - Assessment  Competent    Monitoring - Assessment  Needs education    DM Goals                Most Recent Value    DM Education Needs     Meter  Meter provided    Meter Type  Accuchek    Medication  Insulin, Actions, Side effects, Administration, Pen    Problem Solving  Hypoglycemia, Hyperglycemia, Signs, Symptoms, Treatment    Reducing Risks  A1C testing    Healthy Eating  RD consult    Physical Activity Frequency  Discussed exercise importance    Healthy Coping  Appropriate    Discharge Plan  Home, Follow-up with PCP    Motivation  Engaged    Teaching Method  Explanation, Discussion, Demonstration, Handouts, Teach back    Patient Response  Verbalized understanding, Demonstrates adequately            Other Comments:  30 minutes. Pt granted permission for education. Pt is a 21 year old male with a history of poorly " "controlled diabetes. Patient shared he was diagnosed last April, 2016. Pt has been prescribed Metformin and Levemir at home. Pt decided in November, 2016 to stop taking his DM meds and try \"diet control\" only. Pt did self restart his Metformin in the past week. Pt is able to verbalize an understanding today he needs DM control to promote healing and reduce risk of future complications related to DM. Patient he does plan to take his DM medications as prescribed now. Discussed and taught pt about the use of basal/bolus insulin. Taught pt Humalog is taken just prior to 1st bite of meals and Levemir is a long acting insulin lasting up to 24 hours. Reviewed with pt the correct sites for insulin injections and importance of rotating the sites. Patient verbalizes an understanding on how to use insulin pens and needles at home. Pt would like his discharge Rx to be for pens and needles. Meter education:  Donated glucose meter provided. Instructed how to prepare lancing device, obtain a sample, and perform test, safe disposal of lancets.  Testing frequency per MD instructions, provided general guidelines on target blood glucose, advised patient to discuss personal targets for glucose at next visit.  Record keeping discussed. Pt was able to return demonstration using teach back method. Discussed and demonstrated how to log test result.  Urged to call [phone] number on back of meter if have any difficulties, complete the warranty card and mail.  Urged patient to obtain prescriptions for test strips and lancets from provider. Discussed and taught pt the s/s of hypoglycemia and treatment measures for hypoglycemia. Pt was encouraged to f/u with his PCP.         Electronically signed by:  Kimberley Oscar RN  02/13/17 2:29 PM  "

## 2017-02-13 NOTE — PLAN OF CARE
Problem: Diabetes, Type 1 (Adult)  Goal: Signs and Symptoms of Listed Potential Problems Will be Absent or Manageable (Diabetes, Type 1)  Outcome: Ongoing (interventions implemented as appropriate)    Problem: Patient Care Overview (Adult)  Goal: Plan of Care Review  Outcome: Ongoing (interventions implemented as appropriate)    02/13/17 0413   Coping/Psychosocial Response Interventions   Plan Of Care Reviewed With patient;family   Patient Care Overview   Progress improving   Outcome Evaluation   Outcome Summary/Follow up Plan DC'ed insulin gtt, put on sliding scale ACHS insulin. Denies pain. ID consulted. Ambulates, etn PO diabetic diet. Receiving IV abx.        Goal: Adult Individualization and Mutuality  Outcome: Ongoing (interventions implemented as appropriate)  Goal: Discharge Needs Assessment  Outcome: Ongoing (interventions implemented as appropriate)    Problem: Infection, Risk/Actual (Adult)  Goal: Identify Related Risk Factors and Signs and Symptoms  Outcome: Ongoing (interventions implemented as appropriate)  Goal: Infection Prevention/Resolution  Outcome: Ongoing (interventions implemented as appropriate)

## 2017-02-13 NOTE — PROGRESS NOTES
[facility] Medicine Services  INPATIENT PROGRESS NOTE    Date of Admission: 2/12/2017  Length of Stay: 1  Primary Care Physician: Rich Peralta MD    Subjective     Chief Complaint: penile pain    HPI:  Swelling is a bit better.  Foreskin still doesn't retract  Sees Dr peralta, an endocrinol near [place]  Is supposed to be on inuslin and metf.    Review Of Systems:   Review of Systems      Objective      Temp:  [98.1 °F (36.7 °C)-99.7 °F (37.6 °C)] 98.2 °F (36.8 °C)  Heart Rate:  [78-91] 78  Resp:  [16] 16  BP: (114-139)/(66-88) 129/66  Physical Exam  Gen:  WD/WN seen walking arpound in room  Neuro: alert and oriented, clear speech, follows commands, grossly nonfocal  HEENT:  NC/AT PERRL, OP benign  Neck:  Supple, no LAD  Heart RRR no murmur, rub, or gallop  Abd:  Soft, nontender, no rebound or guarding, pos BS  Extrem:  No c/c/e  gential exam not done today      Results Review:    I have reviewed the labs, radiology results and diagnostic studies.      Results from last 7 days  Lab Units 02/13/17  0508   WBC 10*3/mm3 3.31*   HEMOGLOBIN g/dL 13.1   PLATELETS 10*3/mm3 180       Results from last 7 days  Lab Units 02/13/17  0508   SODIUM mmol/L 137   POTASSIUM mmol/L 3.5   TOTAL CO2 mmol/L 26.0   CREATININE mg/dL 0.50*   GLUCOSE mg/dL 176*       Culture Data:   URINE CULTURE   Date Value Ref Range Status   02/12/2017 No growth  Preliminary     WOUND CULTURE   Date Value Ref Range Status   02/12/2017 Culture in progress  Preliminary     Radiology Data:     I have reviewed the medications.    Assessment/Plan     Assessment/Problem List  Principal Problem:    Inguinal lymphadenitis  Active Problems:    Type 2 diabetes mellitus with hyperglycemia, without long-term current use of insulin    Medically noncompliant    Balanitis    Phimosis    Acute cystitis without hematuria    21 yr old noncompliant pt with balanitis and uncontrolled blood sugars    Plan    abx for now  Diabetic educator to  see  I counseled him on the risks of uncontrolled dm    DVT prophylaxis:  Discharge Planning: I expect patient to be discharged to home in 1 days.    Chelsie Lane MD   02/13/17   12:25 PM    Please note that portions of this note may have been completed with a voice recognition program. Efforts were made to edit the dictations, but occasionally words are mistranscribed.

## 2017-02-14 VITALS
HEIGHT: 71 IN | HEART RATE: 72 BPM | WEIGHT: 176 LBS | RESPIRATION RATE: 16 BRPM | SYSTOLIC BLOOD PRESSURE: 132 MMHG | DIASTOLIC BLOOD PRESSURE: 89 MMHG | BODY MASS INDEX: 24.64 KG/M2 | TEMPERATURE: 97.9 F | OXYGEN SATURATION: 100 %

## 2017-02-14 LAB
BACTERIA SPEC AEROBE CULT: NORMAL
DEPRECATED RDW RBC AUTO: 43.6 FL (ref 37–54)
ERYTHROCYTE [DISTWIDTH] IN BLOOD BY AUTOMATED COUNT: 13.1 % (ref 11.3–14.5)
GLUCOSE BLDC GLUCOMTR-MCNC: 202 MG/DL (ref 70–130)
GLUCOSE BLDC GLUCOMTR-MCNC: 230 MG/DL (ref 70–130)
HCT VFR BLD AUTO: 37.4 % (ref 38.9–50.9)
HGB BLD-MCNC: 12.8 G/DL (ref 13.1–17.5)
MCH RBC QN AUTO: 31.1 PG (ref 27–31)
MCHC RBC AUTO-ENTMCNC: 34.2 G/DL (ref 32–36)
MCV RBC AUTO: 91 FL (ref 80–99)
PLATELET # BLD AUTO: 189 10*3/MM3 (ref 150–450)
PMV BLD AUTO: 9.8 FL (ref 6–12)
RBC # BLD AUTO: 4.11 10*6/MM3 (ref 4.2–5.76)
VANCOMYCIN TROUGH SERPL-MCNC: 3.3 MCG/ML (ref 10–20)
WBC NRBC COR # BLD: 3.04 10*3/MM3 (ref 4.5–13.5)

## 2017-02-14 PROCEDURE — 80202 ASSAY OF VANCOMYCIN: CPT

## 2017-02-14 PROCEDURE — 25010000003 CEFTRIAXONE PER 250 MG: Performed by: INTERNAL MEDICINE

## 2017-02-14 PROCEDURE — 25010000002 PIPERACILLIN SOD-TAZOBACTAM PER 1 G: Performed by: NURSE PRACTITIONER

## 2017-02-14 PROCEDURE — 85027 COMPLETE CBC AUTOMATED: CPT | Performed by: INTERNAL MEDICINE

## 2017-02-14 PROCEDURE — 25010000002 VANCOMYCIN

## 2017-02-14 PROCEDURE — 99239 HOSP IP/OBS DSCHRG MGMT >30: CPT | Performed by: NURSE PRACTITIONER

## 2017-02-14 PROCEDURE — 82962 GLUCOSE BLOOD TEST: CPT

## 2017-02-14 RX ORDER — CEFTRIAXONE SODIUM 2 G/50ML
2 INJECTION, SOLUTION INTRAVENOUS EVERY 24 HOURS
Refills: 0 | Status: ON HOLD
Start: 2017-02-14 | End: 2021-09-18

## 2017-02-14 RX ORDER — DOXYCYCLINE HYCLATE 100 MG/1
100 CAPSULE ORAL EVERY 12 HOURS SCHEDULED
Refills: 0
Start: 2017-02-14 | End: 2017-02-24

## 2017-02-14 RX ORDER — LANCETS 28 GAUGE
EACH MISCELLANEOUS
Qty: 100 EACH | Refills: 0 | Status: SHIPPED | OUTPATIENT
Start: 2017-02-14 | End: 2022-03-31 | Stop reason: SDUPTHER

## 2017-02-14 RX ORDER — FLUCONAZOLE 200 MG/1
200 TABLET ORAL EVERY 24 HOURS
Refills: 0
Start: 2017-02-14 | End: 2017-02-24

## 2017-02-14 RX ORDER — VANCOMYCIN HYDROCHLORIDE
15 EVERY 8 HOURS
Status: DISCONTINUED | OUTPATIENT
Start: 2017-02-14 | End: 2017-02-14 | Stop reason: HOSPADM

## 2017-02-14 RX ORDER — DOXYCYCLINE HYCLATE 100 MG/1
100 CAPSULE ORAL EVERY 12 HOURS SCHEDULED
Status: DISCONTINUED | OUTPATIENT
Start: 2017-02-14 | End: 2017-02-14 | Stop reason: HOSPADM

## 2017-02-14 RX ORDER — FLUCONAZOLE 200 MG/1
200 TABLET ORAL EVERY 24 HOURS
Status: DISCONTINUED | OUTPATIENT
Start: 2017-02-14 | End: 2017-02-14 | Stop reason: HOSPADM

## 2017-02-14 RX ORDER — SACCHAROMYCES BOULARDII 250 MG
250 CAPSULE ORAL 2 TIMES DAILY
Qty: 20 CAPSULE | Refills: 0 | Status: ON HOLD | OUTPATIENT
Start: 2017-02-14 | End: 2021-09-18

## 2017-02-14 RX ORDER — CEFTRIAXONE SODIUM 2 G/50ML
2 INJECTION, SOLUTION INTRAVENOUS EVERY 24 HOURS
Status: DISCONTINUED | OUTPATIENT
Start: 2017-02-14 | End: 2017-02-14 | Stop reason: HOSPADM

## 2017-02-14 RX ADMIN — SODIUM CHLORIDE 100 ML/HR: 9 INJECTION, SOLUTION INTRAVENOUS at 01:47

## 2017-02-14 RX ADMIN — VANCOMYCIN HYDROCHLORIDE 1250 MG: 1 INJECTION, POWDER, LYOPHILIZED, FOR SOLUTION INTRAVENOUS at 08:09

## 2017-02-14 RX ADMIN — Medication 250 MG: at 08:09

## 2017-02-14 RX ADMIN — TAZOBACTAM SODIUM AND PIPERACILLIN SODIUM 3.38 G: 375; 3 INJECTION, SOLUTION INTRAVENOUS at 01:46

## 2017-02-14 RX ADMIN — TAZOBACTAM SODIUM AND PIPERACILLIN SODIUM 3.38 G: 375; 3 INJECTION, SOLUTION INTRAVENOUS at 08:09

## 2017-02-14 RX ADMIN — DOXYCYCLINE HYCLATE 100 MG: 100 CAPSULE, GELATIN COATED ORAL at 11:14

## 2017-02-14 RX ADMIN — DOCUSATE SODIUM 100 MG: 100 CAPSULE, LIQUID FILLED ORAL at 08:09

## 2017-02-14 RX ADMIN — INSULIN LISPRO 4 UNITS: 100 INJECTION, SOLUTION INTRAVENOUS; SUBCUTANEOUS at 12:54

## 2017-02-14 RX ADMIN — CEFTRIAXONE SODIUM 2 G: 2 INJECTION, SOLUTION INTRAVENOUS at 13:43

## 2017-02-14 RX ADMIN — FLUCONAZOLE 200 MG: 200 TABLET ORAL at 12:54

## 2017-02-14 RX ADMIN — INSULIN LISPRO 4 UNITS: 100 INJECTION, SOLUTION INTRAVENOUS; SUBCUTANEOUS at 08:09

## 2017-02-14 NOTE — PROGRESS NOTES
Pharmacy Consult - Vancomycin Dosing    Patient is a 22 yo male on IV antibiotics for severe balanitis/genital cellulitis.  Pharmacy consulted to manage vancomycin therapy.    Indication: SSTI  Consulting Provider: Tima RAMIRES    Current Antimicrobial Therapy  1. Vancomycin: PTD (2/12)  2. Zosyn: 3.375g every 6 hours (2/12)    Allergies:  Review of patient's allergies indicates no known allergies.    Labs    Results from last 7 days   Lab Units 02/13/17  0508 02/12/17  1152   SODIUM mmol/L 137 135   POTASSIUM mmol/L 3.5 4.3   CHLORIDE mmol/L 105 97*   TOTAL CO2 mmol/L 26.0 33.0*   BUN mg/dL 9 11   CREATININE mg/dL 0.50* 0.70   CALCIUM mg/dL 9.0 10.6*   BILIRUBIN mg/dL --  0.5   ALK PHOS U/L --  104*   ALT (SGPT) U/L --  31   AST (SGOT) U/L --  21   GLUCOSE mg/dL 176* 461*     Results from last 7 days   Lab Units 02/14/17  0543 02/13/17  0508 02/12/17  1152   WBC 10*3/mm3 3.04* 3.31* 3.05*   HEMOGLOBIN g/dL 12.8* 13.1 14.3   HEMATOCRIT % 37.4* 37.6* 41.2   PLATELETS 10*3/mm3 189 180 184     Temp Readings from Last 1 Encounters:   02/14/17 97.6 °F (36.4 °C)     Evaluation of Dosing  Weight: 79.8 kg  Goal Trough: 10-15 mcg/mL    Estimated Creatinine Clearance: 251 mL/min (by C-G formula based on Cr of 0.5).    I/O last 3 completed shifts:  In: 6890 [P.O.:2460; I.V.:4080; IV Piggyback:350]  Out: 600 [Urine:600]  *unmeasured*    Microbiology     URINE CULTURE   Date Value Ref Range Status   02/12/2017 No growth  Preliminary     WOUND CULTURE   Date Value Ref Range Status   02/12/2017 Culture in progress  Preliminary     Evaluation of Level    Lab Results   Component Value Date    VANCOTROUGH 3.30 (L) 02/14/2017     Vancomycin trough drawn appropriately prior to the fourth total dose returned SUBtherapeutic for indication.     Assessment:  Renal function remains stable with adequate urine output.  Pt afebrile with normal WBC.        Plan:  1. Will change vancomycin frequency to every 8 hours and re-check a level tomorrow  morning to determine if an increase in dose is also needed.  2. Pharmacy will continue to monitor and adjust vancomycin dose as necessary based on renal function, cultures, labs, and clinical status.     Thank you,  Sam Ross, PharmD  Pharmacy Resident  2/14/2017  8:41 AM

## 2017-02-14 NOTE — PLAN OF CARE
Problem: Diabetes, Type 1 (Adult)  Goal: Signs and Symptoms of Listed Potential Problems Will be Absent or Manageable (Diabetes, Type 1)  Outcome: Ongoing (interventions implemented as appropriate)    Problem: Patient Care Overview (Adult)  Goal: Plan of Care Review  Outcome: Ongoing (interventions implemented as appropriate)    02/13/17 0413 02/13/17 2000 02/14/17 0410   Coping/Psychosocial Response Interventions   Plan Of Care Reviewed With --  patient;family --    Patient Care Overview   Progress improving --  --    Outcome Evaluation   Outcome Summary/Follow up Plan --  --  ACCU checks ACHS. SSI. VSS. No overnight events. Groin abscess improving with IV abx. Tolerating PO diabetic diet. ID and DM educator seeing.       Goal: Adult Individualization and Mutuality  Outcome: Ongoing (interventions implemented as appropriate)  Goal: Discharge Needs Assessment  Outcome: Ongoing (interventions implemented as appropriate)    Problem: Infection, Risk/Actual (Adult)  Goal: Identify Related Risk Factors and Signs and Symptoms  Outcome: Ongoing (interventions implemented as appropriate)  Goal: Infection Prevention/Resolution  Outcome: Ongoing (interventions implemented as appropriate)

## 2017-02-14 NOTE — PROGRESS NOTES
[facility] Progress Note    Admission Date: 2/12/2017    Ford Mukherjee Jr.  1995  [id number]    Date: 2/14/2017    Meds:    IV Anti-Infectives     Ordered     Dose/Rate Route Frequency Start Stop    02/14/17 0833  vancomycin IVPB 1250 mg in 250 mL NS (premix)     Ordering Provider:  Sam Ross RPH    15 mg/kg × 79.8 kg  over 90 Minutes Intravenous Every 8 Hours 02/14/17 1600      02/14/17 0904  fluconazole (DIFLUCAN) tablet 200 mg     Ordering Provider:  Derrick Ma MD    200 mg Oral Every 24 Hours 02/14/17 1400      02/14/17 0904  cefTRIAXone (ROCEPHIN) IVPB 2 g     Ordering Provider:  Derrick Ma MD    2 g  over 30 Minutes Intravenous Every 24 Hours 02/14/17 1400      02/14/17 0909  doxycycline (VIBRAMYCIN) capsule 100 mg     Ordering Provider:  Derrick Ma MD    100 mg Oral Every 12 Hours Scheduled 02/14/17 0945      02/12/17 1817  vancomycin IVPB 1250 mg in 250 mL NS (premix)     Rayray Kramer RPH reviewed the order on 02/13/17 1721.   Ordering Provider:  Rayray Kramer RPH    1,250 mg  over 90 Minutes Intravenous Every 12 Hours 02/13/17 0600 02/13/17 1842    02/12/17 1521  Pharmacy to dose vancomycin     Ordering Provider:  IKE Padgett     Does not apply Continuous PRN 02/12/17 1521      02/12/17 1403  piperacillin-tazobactam (ZOSYN) 4.5 g/100 mL 0.9% NS IVPB (mbp)     Ordering Provider:  Dejan Peoples MD    4.5 g  over 4 Hours Intravenous Once 02/12/17 1405 02/12/17 1826    02/12/17 1327  vancomycin IVPB 1500 mg in 0.9% NaCl (Premix) 500 mL     Ordering Provider:  IDALMIS Das    20 mg/kg × 79.8 kg  over 90 Minutes Intravenous Once 02/12/17 1329 02/12/17 1940          CC: Balanitis       SUBJECTIVE: 213/17: Patient is a 21 y.o. male who presented to the ED with complaints of penile swelling, inability to retract his foreskin, and burning with urination. He was diagnosed with diabetes last May, and did not take his medications. He states he has been treated for  Yeast infections in the past. He was seen at [facility] and given Fluconazole. He was told if it failed to improve to present to the ED. Yesterday , he noted left inguinal swelling and presented to the ED. He has been afebrile. Empiric Vancomycin and Zosyn, Fluconazole initiated on admission, and we were consulted for antibiotic management  17:  Decreased penile swelling , still with drainage.  Overall feeling better. No nv/d.    ROS:  No f/c/s. No n/v/d. No rash. No new ADR to Abx.     PE:   Vital Signs  Temp (24hrs), Av.8 °F (36.6 °C), Min:97.4 °F (36.3 °C), Max:98.2 °F (36.8 °C)    Temp  Min: 97.4 °F (36.3 °C)  Max: 98.2 °F (36.8 °C)  BP  Min: 110/58  Max: 134/84  Pulse  Min: 66  Max: 79  Resp  Min: 16  Max: 18  SpO2  Min: 97 %  Max: 98 %    GENERAL: Awake and alert, in no acute distress.   HEENT:  No conjunctival injection. No icterus. Oropharynx clear without evidence of thrush or exudate.  NECK: Supple, no JVD     HEART: RRR; No murmur, rubs, gallops.   LUNGS: Clear to auscultation bilaterally without wheezing, rales, rhonchi. Normal respiratory effort. Nonlabored. No dullness.  ABDOMEN: Soft, nontender, nondistended. Positive bowel sounds. No rebound or guarding. NO mass or HSM.  EXT:  No cyanosis, clubbing or edema. No cord.  :  Slight decrease penile swelling , with creamy drainage noted.  Decreased left inguinal swelling  SKIN: Warm and dry without cutaneous eruptions on Inspection/palpation.    NEURO: Alert and oriented x 3, Motor 5/5 bilaterally    Laboratory Data      Results from last 7 days  Lab Units 17  0543 17  0508 17  1152   WBC 10*3/mm3 3.04* 3.31* 3.05*   HEMOGLOBIN g/dL 12.8* 13.1 14.3   HEMATOCRIT % 37.4* 37.6* 41.2   PLATELETS 10*3/mm3 189 180 184       Results from last 7 days  Lab Units 17  0508   SODIUM mmol/L 137   POTASSIUM mmol/L 3.5   CHLORIDE mmol/L 105   TOTAL CO2 mmol/L 26.0   BUN mg/dL 9   CREATININE mg/dL 0.50*   GLUCOSE mg/dL 176*   CALCIUM mg/dL 9.0        Results from last 7 days  Lab Units 02/12/17  1152   ALK PHOS U/L 104*   BILIRUBIN mg/dL 0.5   ALT (SGPT) U/L 31   AST (SGOT) U/L 21                       Results from last 7 days  Lab Units 02/14/17  0543   VANCOMYCIN TR mcg/mL 3.30*     Estimated Creatinine Clearance: 251 mL/min (by C-G formula based on Cr of 0.5).    Microbiology:                    URINE CULTURE   Date Value Ref Range Status   02/12/2017 No growth  Preliminary     WOUND CULTURE   Date Value Ref Range Status   02/12/2017 Culture in progress  Preliminary       Radiology:  Imaging Results (last 72 hours)     Procedure Component Value Units Date/Time    CT Pelvis With Contrast [[id number]] Collected:  02/12/17 1555     Updated:  02/13/17 1301    Narrative:       EXAMINATION: CT PELVIS W CONTRAST     INDICATION: E11.65-Type 2 diabetes mellitus with hyperglycemia;  N39.0-Urinary tract infection, site not specified; B37.42-Candidal  balanitis; N47.1-Phimosis; I88.9-Nonspecific lymphadenitis, unspecified.  Left groin pain.     TECHNIQUE: Multiple axial CT imaging was obtained of the pelvis  following the administration of intravenous contrast.     The radiation dose reduction device was turned on for each scan per the  ALARA (As Low as Reasonably Achievable) protocol.     COMPARISON: NONE     FINDINGS: There is distention of the bladder. Pelvic portion pf the  gastrointestinal tract is within normal limits. Enlargement seen of the  lymph nodes in the left inguinal region with surrounding stranding in  the subcutaneous tissues and skin thickening suggesting a local  cellulitis. Bony structures are unremarkable. No fluid collection to  suggest abscess in the subcutaneous tissues. The muscles are intact.  There is no other pelvic adenopathy. No free fluid or free air. Appendix  is radiographically normal in appearance.       Impression:       Cellulitis identified in the left inguinal region with no  abscess collection present. Enlargement of left  inguinal lymph nodes  suggesting reactive nodes.     DICTATED:     02/12/2017  EDITED:         02/12/2017     This report was finalized on 2/13/2017 12:59 PM by Dr. Marbella Neville MD.             I personally read the radiographic studies     IMPRESSION:  1.  Severe Balanitis/genital cellulitis-this is occurred in the setting of poorly controlled diabetes mellitus.  2. T2 DM, poorly controlled, Hgb A1c of 14 on admission   3. UTI, culture negative  4. Left inguinal adenopathy/cellulitis     RECOMMENDATIONS;  1.  Discontinue Zosyn  2.  Rocephin 2 g IV daily  3.  Increase fluconazole to 200 mg by mouth daily  4.  Doxycycline 100 mg by mouth twice a day  5.  Discontinue vancomycin at discharge   6.  Improved blood glucose control  7.  Discharge to home today with acute outpatient follow-up in our office with me tomorrow      Dr. Ma has obtained the history, performed the physical exam and formulated the above treatment plan.     UM/STEVEN: I coordinated his care today.  I will plan to see him in follow-up in the office tomorrow Wednesday 2/15 at 1300.  I will plan to give him Rocephin 2 g IV daily in the office for a few more days until his cellulitis is significantly improved.  He will also be discharged on doxycycline 100 mg by mouth twice a day and fluconazole 200 mg by mouth daily ×10 days-I placed a prescription on the chart  I spent over 35 minutes on his care today.  I called Dr. Lane regarding his disposition.  Derrick Ma MD  2/14/2017  9:10 AM

## 2017-02-14 NOTE — DISCHARGE SUMMARY
[facility] Medicine Services  DISCHARGE SUMMARY       Date of Admission: 2/12/2017  Date of Discharge:  2/14/2017  Primary Care Physician: Rich Peralta MD    Discharge Diagnoses:  Active Hospital Problems (** Indicates Principal Problem)    Diagnosis Date Noted   • **Inguinal lymphadenitis [I88.9] 02/12/2017   • Type 2 diabetes mellitus with hyperglycemia, without long-term current use of insulin [E11.65] 02/12/2017   • Medically noncompliant [Z91.19] 02/12/2017   • Balanitis [N48.1] 02/12/2017   • Phimosis [N47.1] 02/12/2017   • Acute cystitis without hematuria [N30.00] 02/12/2017      Resolved Hospital Problems    Diagnosis Date Noted Date Resolved   No resolved problems to display.       Presenting Problem/History of Present Illness  Type 2 diabetes mellitus with hyperglycemia, without long-term current use of insulin [E11.65]     History of Present Illness on Day of Discharge:   Resting in bed, comfortable, NAD. Eager to go home. No new events overnight. Denies f/c, n/v/d, cough, SOA, CP, dysuria.     Hospital Course  Patient is a 21 y.o. male [place] type 2 diabetic who has been noncompliant over the last 3 months, not taking medication daily or checking blood sugars. Patient states he does have history of candidal balanitis. Noticed swelling to foreskin and slight stinging with urination approximately 1 week ago. He started having inability to retract foreskin of penis and presented to [facility] for fungal infection. States symptoms he started with were much like previous candidal infection. He was given 3 days Diflucan which he took 2/8/2017 through 2/10/2017. Patient stated he had no improvement in symptoms and went back to [facility] for further evaluation. He was told to monitor and if symptoms worsened to go to ER. Awoke with left groin swelling and tenderness across suprapubic region, with continued dysuria. Admitted to hospital medicine service for further evaluation and treatment  for failed outpatient treatment of balanitis.    Started on empiric IV antibiotics. Consulted ID. Obtained wound culture, and awaiting final results. UTI, culture negative. Continued on IV vanc, zosyn, and fluconazole inpatient. ID discussed possibility of developing Fourniers gangrene with continued uncontrolled DM management. DM educator met with patient and was accepting to education and given new accu check monitor for home use. Patient maintained good glucose control while in patient with Levemir 10 units q HS, and minimal SSI throughout the day. Patient states his FSBG at home when he was taking his prescribed medications of long acting and metformin were controlled. Patient will be given prescription for new Lantus solostar pen with needles as he was previously taking (unsure of age and condition of current pen at home), and prescribed to continue to resume home dose of metformin that patient states he currently has at home with two available refills, lancets and glucose testing strips. Patient informed to call PCP or endocrinologist if FSBG sustains above 180  Two consecutive times at home prior to his 1 week follow up appointment with PCP.   Patient with verbal understanding of importance of DM management and continued tight glucose monitoring and control.   Currently has appointment to follow up with [facility] in office tomorrow at 1 pm with Dr. Mcmahon for Rocephin 2 gm IV infusion, which he will receive daily at a time frame undetermined at this time, but managed by [facility].   Also given prescriptions for Fluconazole daily and Doxycycline BID x 10 days by [facility].   On day of discharge patient feels well, symptoms of inguinal/ penile pain and dysuria have resolved.     Procedures Performed         Consults:   Consults     Date and Time Order Name Status Description    2/12/2017 2124 Inpatient Consult to Infectious Diseases Completed           Pertinent Test Results:  Lab Results   Component Value Date    WBC 3.04  (L) 02/14/2017    HGB 12.8 (L) 02/14/2017    HCT 37.4 (L) 02/14/2017    MCV 91.0 02/14/2017     02/14/2017     Lab Results   Component Value Date    GLUCOSE 176 (H) 02/13/2017    CALCIUM 9.0 02/13/2017     02/13/2017    K 3.5 02/13/2017    CO2 26.0 02/13/2017     02/13/2017    BUN 9 02/13/2017    CREATININE 0.50 (L) 02/13/2017    EGFRIFNONA >150 02/13/2017    BCR 18.0 02/13/2017    ANIONGAP 6.0 02/13/2017   Results for MANSOOR BAILEY JR. (MRN [id number]) as of 2/14/2017 11:01   Ref. Range 2/12/2017 11:52   Color, UA Latest Ref Range: Yellow, Straw  Yellow   Appearance, UA Latest Ref Range: Clear  Clear   Specific [place], UA Latest Ref Range: 1.005 - 1.030  1.015   pH, UA Latest Ref Range: 5.0 - 8.0  6.5   Glucose, UA Latest Ref Range: Negative  >=1000 mg/dL (3+) (A)   Ketones, UA Latest Ref Range: Negative  15 mg/dL (1+) (A)   Blood, UA Latest Ref Range: Negative  Small (1+) (A)   Nitrite, UA Latest Ref Range: Negative  Negative   Leuk Esterase, UA Latest Ref Range: Negative  Trace (A)   Protein, UA Latest Ref Range: Negative  Negative   Bilirubin, UA Latest Ref Range: Negative  Negative   Urobilinogen, UA Latest Ref Range: 0.2 - 1.0 E.U./dL  0.2 E.U./dL   RBC, UA Latest Ref Range: None Seen, 0-2 /HPF 3-6 (A)   WBC, UA Latest Ref Range: None Seen /HPF Too Numerous to C... (A)   Bacteria, UA Latest Ref Range: None Seen, Trace /HPF None Seen   Squamous Epithelial Cells, UA Latest Ref Range: None Seen, 0-2 /HPF 0-2   Hyaline Casts, UA Latest Ref Range: 0 - 6 /LPF None Seen   Methodology: Unknown Manual Light Micr...   Results for MANSOOR BAILEY JR. (MRN [id number]) as of 2/14/2017 11:01   Ref. Range 2/13/2017 05:08   Hemoglobin A1C Latest Ref Range: 4.80 - 5.60 % 14.80 (H)     Results for MANSOOR BAILEY JR. (MRN [id number]) as of 2/14/2017 11:01   Ref. Range 2/13/2017 05:08   TSH Baseline Latest Ref Range: 0.350 - 5.350 mIU/mL 1.123   Urine Culture   Order: [id number] - Reflex for Order [id number]  "  Status:  Final result   Visible to patient:  No (Not Released)   Specimen Information: Urine, Clean Catch; Urine        Culture   <10,000 CFU/mL Normal Urogenital [place]      Resulting Agency: [facility]           CT pelvis   IMPRESSION:  Cellulitis identified in the left inguinal region with no  abscess collection present. Enlargement of left inguinal lymph nodes  suggesting reactive nodes.    Condition on Discharge:  Stable     Physical Exam on Discharge:  Visit Vitals   • /58   • Pulse 66   • Temp 97.6 °F (36.4 °C)   • Resp 16   • Ht 71\" (180.3 cm)   • Wt 176 lb (79.8 kg)   • SpO2 98%   • BMI 24.55 kg/m2     Physical Exam   Constitutional: He is oriented to person, place, and time. He appears well-developed and well-nourished. No distress.   HENT:   Head: Normocephalic and atraumatic.   Mouth/Throat: Oropharynx is clear and moist.   Eyes: Conjunctivae are normal. Pupils are equal, round, and reactive to light. No scleral icterus.   Neck: Neck supple. No JVD present. No thyromegaly present.   Cardiovascular: Normal rate, regular rhythm, normal heart sounds and intact distal pulses.  Exam reveals no gallop and no friction rub.    No murmur heard.  Pulmonary/Chest: Effort normal and breath sounds normal. No respiratory distress. He has no wheezes. He has no rales.   Abdominal: Soft. Bowel sounds are normal. He exhibits no distension. There is no tenderness.   Genitourinary: Penile tenderness present.   Genitourinary Comments: Penile swelling decreased, creamy discharge noted. Denies dysuria. Left inguinal adenopathy present, but decreasing in size.    Musculoskeletal: Normal range of motion. He exhibits no edema or tenderness.   Lymphadenopathy:     He has no cervical adenopathy. Inguinal adenopathy noted on the left side.   Neurological: He is alert and oriented to person, place, and time. No cranial nerve deficit.   Skin: Skin is warm and dry. No rash noted. He is not diaphoretic. No erythema. No pallor. "   Psychiatric: He has a normal mood and affect. His behavior is normal.   Vitals reviewed.    Discharge Disposition  Home or Self Care    Discharge Medications   Ford Mukherjee Jr.   Home Medication Instructions LINN:[id number]    Printed on:02/14/17 1217   Medication Information                      cefTRIAXone (ROCEPHIN) 40 MG/ML IVPB  Infuse 50 mL into a venous catheter Daily. Indications: Skin and Soft Tissue Infection             doxycycline (VIBRAMYCIN) 100 MG capsule  Take 1 capsule by mouth Every 12 (Twelve) Hours for 10 days. Indications: Skin and Soft Tissue Infection             fluconazole (DIFLUCAN) 200 MG tablet  Take 1 tablet by mouth Daily for 10 days. Indications: Skin and Soft Tissue Infection             glucose blood test strip  Use as instructed             insulin detemir (LEVEMIR) 100 UNIT/ML injection  Inject 10 Units under the skin Every Night.             Insulin Glargine (LANTUS SOLOSTAR) 100 UNIT/ML injection pen  Inject 10 Units under the skin Every Night.             Insulin Pen Needle (B-D UF III MINI PEN NEEDLES) 31G X 5 MM misc  1 pen Every Night.             Lancets (FREESTYLE) lancets  Use 4 lancets daily to monitor glucose             metFORMIN (GLUCOPHAGE) 1000 MG tablet  Take 1,000 mg by mouth 2 (Two) Times a Day With Meals.             saccharomyces boulardii (FLORASTOR) 250 MG capsule  Take 1 capsule by mouth 2 (Two) Times a Day.                 Discharge Diet:   Diet Instructions     Diet: Regular, Consistent Carbohydrate; Thin Liquids, No Restrictions       Discharge Diet:   Regular  Consistent Carbohydrate      Fluid Consistency:  Thin Liquids, No Restrictions                 Follow-up Appointments  No future appointments.  Referrals and Follow-ups to Schedule     Additional Follow-Up    As directed    Follow up with Dr. Mcmahon in [facility] office tomorrow at 1pm;       Follow-Up    As directed    F/u with PCP in 1 week                 Test Results Pending at Discharge    Order Current Status    C Trachomatis / N Gonorrhoeae PCR In process    Wound Culture Preliminary result           Ayaka Serna, IKE 02/14/17 12:19 PM    Time: 45 min     Please note that portions of this note may have been completed with a voice recognition program. Efforts were made to edit the dictations, but occasionally words are mistranscribed.

## 2017-02-14 NOTE — PROGRESS NOTES
Continued Stay Note  [facility]     Patient Name: Ford Mukherjee Jr.  MRN: [id number]  Today's Date: 2/14/2017    Admit Date: 2/12/2017          Discharge Plan       02/14/17 1210    Case Management/Social Work Plan    Plan SW spoke to pt at bedside in regards to non-compliance of his diabetes.  Pt stated he can afford his medications but he just honestly has forgotten to manage it.  SW reiterated the importance of maintaining his diabetes and pt stated this hospitalization scared him.  Pt stated he uses the [facility] on [facility] and his dad will  his medication.  Pt will go home with his parents.              Discharge Codes     None        Expected Discharge Date and Time     Expected Discharge Date Expected Discharge Time    Feb 14, 2017             ERICA Dasilva

## 2017-02-15 LAB
C TRACH RRNA SPEC DONR QL NAA+PROBE: NEGATIVE
N GONORRHOEA DNA SPEC QL NAA+PROBE: NEGATIVE

## 2017-02-16 LAB
BACTERIA SPEC AEROBE CULT: ABNORMAL
BACTERIA SPEC AEROBE CULT: ABNORMAL
GRAM STN SPEC: ABNORMAL

## 2017-02-20 ENCOUNTER — LAB (OUTPATIENT)
Dept: LAB | Facility: HOSPITAL | Age: 22
End: 2017-02-20

## 2017-02-20 ENCOUNTER — TRANSCRIBE ORDERS (OUTPATIENT)
Dept: LAB | Facility: HOSPITAL | Age: 22
End: 2017-02-20

## 2017-02-20 DIAGNOSIS — N48.1 BALANITIS: ICD-10-CM

## 2017-02-20 DIAGNOSIS — N49.2 ABSCESS OF SCROTUM: ICD-10-CM

## 2017-02-20 DIAGNOSIS — R59.0 BILATERAL HILAR ADENOPATHY SYNDROME: ICD-10-CM

## 2017-02-20 DIAGNOSIS — N48.22 CELLULITIS OF PENIS: ICD-10-CM

## 2017-02-20 DIAGNOSIS — N48.1 BALANITIS: Primary | ICD-10-CM

## 2017-02-20 DIAGNOSIS — L03.314 CELLULITIS OF GROIN: ICD-10-CM

## 2017-02-20 LAB
ALBUMIN SERPL-MCNC: 4.8 G/DL (ref 3.2–4.8)
ALBUMIN/GLOB SERPL: 1.7 G/DL (ref 1.5–2.5)
ALP SERPL-CCNC: 98 U/L (ref 25–100)
ALT SERPL W P-5'-P-CCNC: 40 U/L (ref 7–40)
ANION GAP SERPL CALCULATED.3IONS-SCNC: 3 MMOL/L (ref 3–11)
AST SERPL-CCNC: 20 U/L (ref 0–33)
BASOPHILS # BLD AUTO: 0.02 10*3/MM3 (ref 0–0.2)
BASOPHILS NFR BLD AUTO: 0.4 % (ref 0–1)
BILIRUB SERPL-MCNC: 0.3 MG/DL (ref 0.3–1.2)
BUN BLD-MCNC: 12 MG/DL (ref 9–23)
BUN/CREAT SERPL: 15 (ref 7–25)
CALCIUM SPEC-SCNC: 10.1 MG/DL (ref 8.7–10.4)
CHLORIDE SERPL-SCNC: 92 MMOL/L (ref 99–109)
CO2 SERPL-SCNC: 34 MMOL/L (ref 20–31)
CREAT BLD-MCNC: 0.8 MG/DL (ref 0.6–1.3)
CRP SERPL-MCNC: 3 MG/DL (ref 0–10)
DEPRECATED RDW RBC AUTO: 41.4 FL (ref 37–54)
EOSINOPHIL # BLD AUTO: 0.04 10*3/MM3 (ref 0.1–0.3)
EOSINOPHIL NFR BLD AUTO: 0.8 % (ref 0–3)
ERYTHROCYTE [DISTWIDTH] IN BLOOD BY AUTOMATED COUNT: 12.7 % (ref 11.3–14.5)
ERYTHROCYTE [SEDIMENTATION RATE] IN BLOOD: 12 MM/HR (ref 0–15)
GFR SERPL CREATININE-BSD FRML MDRD: 123 ML/MIN/1.73
GFR SERPL CREATININE-BSD FRML MDRD: 149 ML/MIN/1.73
GLOBULIN UR ELPH-MCNC: 2.8 GM/DL
GLUCOSE BLD-MCNC: 487 MG/DL (ref 70–100)
HCT VFR BLD AUTO: 43.6 % (ref 38.9–50.9)
HGB BLD-MCNC: 15.9 G/DL (ref 13.1–17.5)
IMM GRANULOCYTES # BLD: 0.06 10*3/MM3 (ref 0–0.03)
IMM GRANULOCYTES NFR BLD: 1.3 % (ref 0–0.6)
LYMPHOCYTES # BLD AUTO: 1.6 10*3/MM3 (ref 0.6–4.8)
LYMPHOCYTES NFR BLD AUTO: 33.4 % (ref 24–44)
MCH RBC QN AUTO: 32.7 PG (ref 27–31)
MCHC RBC AUTO-ENTMCNC: 36.5 G/DL (ref 32–36)
MCV RBC AUTO: 89.7 FL (ref 80–99)
MONOCYTES # BLD AUTO: 0.34 10*3/MM3 (ref 0–1)
MONOCYTES NFR BLD AUTO: 7.1 % (ref 0–12)
NEUTROPHILS # BLD AUTO: 2.73 10*3/MM3 (ref 1.5–8.3)
NEUTROPHILS NFR BLD AUTO: 57 % (ref 41–71)
PLATELET # BLD AUTO: 370 10*3/MM3 (ref 150–450)
PMV BLD AUTO: 10.5 FL (ref 6–12)
POTASSIUM BLD-SCNC: 4.6 MMOL/L (ref 3.5–5.5)
PROT SERPL-MCNC: 7.6 G/DL (ref 5.7–8.2)
RBC # BLD AUTO: 4.86 10*6/MM3 (ref 4.2–5.76)
SODIUM BLD-SCNC: 129 MMOL/L (ref 132–146)
WBC NRBC COR # BLD: 4.79 10*3/MM3 (ref 4.5–13.5)

## 2017-02-20 PROCEDURE — 85025 COMPLETE CBC W/AUTO DIFF WBC: CPT | Performed by: INTERNAL MEDICINE

## 2017-02-20 PROCEDURE — 80053 COMPREHEN METABOLIC PANEL: CPT | Performed by: INTERNAL MEDICINE

## 2017-02-20 PROCEDURE — 86140 C-REACTIVE PROTEIN: CPT | Performed by: INTERNAL MEDICINE

## 2017-02-20 PROCEDURE — 36415 COLL VENOUS BLD VENIPUNCTURE: CPT | Performed by: INTERNAL MEDICINE

## 2017-02-20 PROCEDURE — 85652 RBC SED RATE AUTOMATED: CPT | Performed by: INTERNAL MEDICINE

## 2017-09-15 ENCOUNTER — OFFICE VISIT (OUTPATIENT)
Dept: RETAIL CLINIC | Facility: CLINIC | Age: 22
End: 2017-09-15

## 2017-09-15 DIAGNOSIS — Z02.1 DRUG SCREENING, PRE-EMPLOYMENT: Primary | ICD-10-CM

## 2021-09-18 ENCOUNTER — APPOINTMENT (OUTPATIENT)
Dept: GENERAL RADIOLOGY | Facility: HOSPITAL | Age: 26
End: 2021-09-18

## 2021-09-18 ENCOUNTER — HOSPITAL ENCOUNTER (OUTPATIENT)
Facility: HOSPITAL | Age: 26
Setting detail: OBSERVATION
Discharge: HOME | End: 2021-09-19
Attending: EMERGENCY MEDICINE | Admitting: INTERNAL MEDICINE

## 2021-09-18 DIAGNOSIS — K92.0 COFFEE GROUND EMESIS: ICD-10-CM

## 2021-09-18 DIAGNOSIS — K92.2 ACUTE UPPER GI BLEEDING: Primary | ICD-10-CM

## 2021-09-18 DIAGNOSIS — K92.0 HEMATEMESIS WITH NAUSEA: ICD-10-CM

## 2021-09-18 DIAGNOSIS — E86.0 DEHYDRATION: ICD-10-CM

## 2021-09-18 LAB
ABO GROUP BLD: NORMAL
ALBUMIN SERPL-MCNC: 5.6 G/DL (ref 3.5–5.2)
ALBUMIN/GLOB SERPL: 1.9 G/DL
ALP SERPL-CCNC: 67 U/L (ref 39–117)
ALT SERPL W P-5'-P-CCNC: 26 U/L (ref 1–41)
ANION GAP SERPL CALCULATED.3IONS-SCNC: 18 MMOL/L (ref 5–15)
AST SERPL-CCNC: 14 U/L (ref 1–40)
ATMOSPHERIC PRESS: ABNORMAL MM[HG]
B-OH-BUTYR SERPL-SCNC: 0.28 MMOL/L (ref 0.02–0.27)
BACTERIA UR QL AUTO: ABNORMAL /HPF
BASE EXCESS BLDV CALC-SCNC: 4 MMOL/L (ref -2–2)
BASOPHILS # BLD AUTO: 0.01 10*3/MM3 (ref 0–0.2)
BASOPHILS NFR BLD AUTO: 0.1 % (ref 0–1.5)
BDY SITE: ABNORMAL
BILIRUB SERPL-MCNC: 0.9 MG/DL (ref 0–1.2)
BILIRUB UR QL STRIP: NEGATIVE
BLD GP AB SCN SERPL QL: NEGATIVE
BODY TEMPERATURE: 37 C
BUN SERPL-MCNC: 14 MG/DL (ref 6–20)
BUN/CREAT SERPL: 19.7 (ref 7–25)
CALCIUM SPEC-SCNC: 10.2 MG/DL (ref 8.6–10.5)
CHLORIDE SERPL-SCNC: 96 MMOL/L (ref 98–107)
CLARITY UR: CLEAR
CO2 BLDA-SCNC: 30.8 MMOL/L (ref 22–33)
CO2 SERPL-SCNC: 25 MMOL/L (ref 22–29)
COHGB MFR BLD: 0.9 %
COLOR UR: YELLOW
CREAT SERPL-MCNC: 0.71 MG/DL (ref 0.76–1.27)
D-LACTATE SERPL-SCNC: 2.5 MMOL/L (ref 0.5–2)
DEPRECATED RDW RBC AUTO: 37.8 FL (ref 37–54)
EOSINOPHIL # BLD AUTO: 0 10*3/MM3 (ref 0–0.4)
EOSINOPHIL NFR BLD AUTO: 0 % (ref 0.3–6.2)
EPAP: 0
ERYTHROCYTE [DISTWIDTH] IN BLOOD BY AUTOMATED COUNT: 12.3 % (ref 12.3–15.4)
GASTROCULT GAST QL: POSITIVE
GFR SERPL CREATININE-BSD FRML MDRD: 135 ML/MIN/1.73
GFR SERPL CREATININE-BSD FRML MDRD: >150 ML/MIN/1.73
GLOBULIN UR ELPH-MCNC: 2.9 GM/DL
GLUCOSE BLDC GLUCOMTR-MCNC: 203 MG/DL (ref 70–130)
GLUCOSE BLDC GLUCOMTR-MCNC: 207 MG/DL (ref 70–130)
GLUCOSE SERPL-MCNC: 226 MG/DL (ref 65–99)
GLUCOSE UR STRIP-MCNC: ABNORMAL MG/DL
HCO3 BLDV-SCNC: 29.3 MMOL/L (ref 22–28)
HCT VFR BLD AUTO: 41.1 % (ref 37.5–51)
HGB BLD-MCNC: 14.3 G/DL (ref 13–17.7)
HGB BLDA-MCNC: 14.9 G/DL (ref 13.5–17.5)
HGB UR QL STRIP.AUTO: NEGATIVE
HOLD SPECIMEN: NORMAL
HYALINE CASTS UR QL AUTO: ABNORMAL /LPF
IMM GRANULOCYTES # BLD AUTO: 0.09 10*3/MM3 (ref 0–0.05)
IMM GRANULOCYTES NFR BLD AUTO: 0.6 % (ref 0–0.5)
INHALED O2 CONCENTRATION: 21 %
IPAP: 0
KETONES UR QL STRIP: ABNORMAL
LEUKOCYTE ESTERASE UR QL STRIP.AUTO: NEGATIVE
LIPASE SERPL-CCNC: 14 U/L (ref 13–60)
LYMPHOCYTES # BLD AUTO: 0.5 10*3/MM3 (ref 0.7–3.1)
LYMPHOCYTES NFR BLD AUTO: 3.1 % (ref 19.6–45.3)
MCH RBC QN AUTO: 29.7 PG (ref 26.6–33)
MCHC RBC AUTO-ENTMCNC: 34.8 G/DL (ref 31.5–35.7)
MCV RBC AUTO: 85.4 FL (ref 79–97)
METHGB BLD QL: 0.9 %
MODALITY: ABNORMAL
MONOCYTES # BLD AUTO: 0.41 10*3/MM3 (ref 0.1–0.9)
MONOCYTES NFR BLD AUTO: 2.5 % (ref 5–12)
NEUTROPHILS NFR BLD AUTO: 15.13 10*3/MM3 (ref 1.7–7)
NEUTROPHILS NFR BLD AUTO: 93.7 % (ref 42.7–76)
NITRITE UR QL STRIP: NEGATIVE
NOTE: ABNORMAL
NRBC BLD AUTO-RTO: 0 /100 WBC (ref 0–0.2)
OXYHGB MFR BLDV: 43 %
PAW @ PEAK INSP FLOW SETTING VENT: 0 CMH2O
PCO2 BLDV: 45.8 MM HG (ref 41–51)
PH BLDV: 7.42 PH UNITS (ref 7.31–7.41)
PH UR STRIP.AUTO: 7 [PH] (ref 5–8)
PLATELET # BLD AUTO: 272 10*3/MM3 (ref 140–450)
PMV BLD AUTO: 9.8 FL (ref 6–12)
PO2 BLDV: 24.7 MM HG (ref 27–53)
POTASSIUM SERPL-SCNC: 3.5 MMOL/L (ref 3.5–5.2)
PROCALCITONIN SERPL-MCNC: 0.06 NG/ML (ref 0–0.25)
PROT SERPL-MCNC: 8.5 G/DL (ref 6–8.5)
PROT UR QL STRIP: ABNORMAL
RBC # BLD AUTO: 4.81 10*6/MM3 (ref 4.14–5.8)
RBC # UR: ABNORMAL /HPF
REF LAB TEST METHOD: ABNORMAL
RH BLD: POSITIVE
SODIUM SERPL-SCNC: 139 MMOL/L (ref 136–145)
SP GR UR STRIP: 1.03 (ref 1–1.03)
SQUAMOUS #/AREA URNS HPF: ABNORMAL /HPF
T&S EXPIRATION DATE: NORMAL
TOTAL RATE: 0 BREATHS/MINUTE
UROBILINOGEN UR QL STRIP: ABNORMAL
WBC # BLD AUTO: 16.14 10*3/MM3 (ref 3.4–10.8)
WBC UR QL AUTO: ABNORMAL /HPF
WHOLE BLOOD HOLD SPECIMEN: NORMAL
WHOLE BLOOD HOLD SPECIMEN: NORMAL

## 2021-09-18 PROCEDURE — 80053 COMPREHEN METABOLIC PANEL: CPT | Performed by: EMERGENCY MEDICINE

## 2021-09-18 PROCEDURE — 82271 OCCULT BLOOD OTHER SOURCES: CPT | Performed by: PHYSICIAN ASSISTANT

## 2021-09-18 PROCEDURE — 86900 BLOOD TYPING SEROLOGIC ABO: CPT | Performed by: PHYSICIAN ASSISTANT

## 2021-09-18 PROCEDURE — 96374 THER/PROPH/DIAG INJ IV PUSH: CPT

## 2021-09-18 PROCEDURE — 82010 KETONE BODYS QUAN: CPT | Performed by: PHYSICIAN ASSISTANT

## 2021-09-18 PROCEDURE — G0378 HOSPITAL OBSERVATION PER HR: HCPCS

## 2021-09-18 PROCEDURE — 71045 X-RAY EXAM CHEST 1 VIEW: CPT

## 2021-09-18 PROCEDURE — 85025 COMPLETE CBC W/AUTO DIFF WBC: CPT | Performed by: EMERGENCY MEDICINE

## 2021-09-18 PROCEDURE — 86901 BLOOD TYPING SEROLOGIC RH(D): CPT

## 2021-09-18 PROCEDURE — 83690 ASSAY OF LIPASE: CPT | Performed by: EMERGENCY MEDICINE

## 2021-09-18 PROCEDURE — 25010000002 PROCHLORPERAZINE 10 MG/2ML SOLUTION: Performed by: PHYSICIAN ASSISTANT

## 2021-09-18 PROCEDURE — 96361 HYDRATE IV INFUSION ADD-ON: CPT

## 2021-09-18 PROCEDURE — 96375 TX/PRO/DX INJ NEW DRUG ADDON: CPT

## 2021-09-18 PROCEDURE — 81001 URINALYSIS AUTO W/SCOPE: CPT | Performed by: EMERGENCY MEDICINE

## 2021-09-18 PROCEDURE — 82962 GLUCOSE BLOOD TEST: CPT

## 2021-09-18 PROCEDURE — 82805 BLOOD GASES W/O2 SATURATION: CPT

## 2021-09-18 PROCEDURE — 99284 EMERGENCY DEPT VISIT MOD MDM: CPT

## 2021-09-18 PROCEDURE — U0003 INFECTIOUS AGENT DETECTION BY NUCLEIC ACID (DNA OR RNA); SEVERE ACUTE RESPIRATORY SYNDROME CORONAVIRUS 2 (SARS-COV-2) (CORONAVIRUS DISEASE [COVID-19]), AMPLIFIED PROBE TECHNIQUE, MAKING USE OF HIGH THROUGHPUT TECHNOLOGIES AS DESCRIBED BY CMS-2020-01-R: HCPCS | Performed by: PHYSICIAN ASSISTANT

## 2021-09-18 PROCEDURE — 84145 PROCALCITONIN (PCT): CPT | Performed by: PHYSICIAN ASSISTANT

## 2021-09-18 PROCEDURE — 99220 PR INITIAL OBSERVATION CARE/DAY 70 MINUTES: CPT | Performed by: INTERNAL MEDICINE

## 2021-09-18 PROCEDURE — 86850 RBC ANTIBODY SCREEN: CPT | Performed by: PHYSICIAN ASSISTANT

## 2021-09-18 PROCEDURE — 25010000002 ONDANSETRON PER 1 MG: Performed by: PHYSICIAN ASSISTANT

## 2021-09-18 PROCEDURE — 86900 BLOOD TYPING SEROLOGIC ABO: CPT

## 2021-09-18 PROCEDURE — C9803 HOPD COVID-19 SPEC COLLECT: HCPCS

## 2021-09-18 PROCEDURE — 86901 BLOOD TYPING SEROLOGIC RH(D): CPT | Performed by: PHYSICIAN ASSISTANT

## 2021-09-18 PROCEDURE — 83605 ASSAY OF LACTIC ACID: CPT | Performed by: PHYSICIAN ASSISTANT

## 2021-09-18 RX ORDER — ONDANSETRON 2 MG/ML
4 INJECTION INTRAMUSCULAR; INTRAVENOUS ONCE
Status: COMPLETED | OUTPATIENT
Start: 2021-09-18 | End: 2021-09-18

## 2021-09-18 RX ORDER — SODIUM CHLORIDE 0.9 % (FLUSH) 0.9 %
10 SYRINGE (ML) INJECTION AS NEEDED
Status: DISCONTINUED | OUTPATIENT
Start: 2021-09-18 | End: 2021-09-19 | Stop reason: HOSPADM

## 2021-09-18 RX ORDER — SODIUM CHLORIDE 9 MG/ML
10 INJECTION INTRAVENOUS AS NEEDED
Status: DISCONTINUED | OUTPATIENT
Start: 2021-09-18 | End: 2021-09-19 | Stop reason: HOSPADM

## 2021-09-18 RX ORDER — PANTOPRAZOLE SODIUM 40 MG/10ML
40 INJECTION, POWDER, LYOPHILIZED, FOR SOLUTION INTRAVENOUS EVERY 12 HOURS SCHEDULED
Status: DISCONTINUED | OUTPATIENT
Start: 2021-09-18 | End: 2021-09-19 | Stop reason: HOSPADM

## 2021-09-18 RX ORDER — CHOLECALCIFEROL (VITAMIN D3) 125 MCG
5 CAPSULE ORAL NIGHTLY PRN
Status: DISCONTINUED | OUTPATIENT
Start: 2021-09-18 | End: 2021-09-19 | Stop reason: HOSPADM

## 2021-09-18 RX ORDER — SODIUM CHLORIDE 0.9 % (FLUSH) 0.9 %
10 SYRINGE (ML) INJECTION EVERY 12 HOURS SCHEDULED
Status: DISCONTINUED | OUTPATIENT
Start: 2021-09-18 | End: 2021-09-19 | Stop reason: HOSPADM

## 2021-09-18 RX ORDER — ACETAMINOPHEN 325 MG/1
650 TABLET ORAL EVERY 4 HOURS PRN
Status: DISCONTINUED | OUTPATIENT
Start: 2021-09-18 | End: 2021-09-19 | Stop reason: HOSPADM

## 2021-09-18 RX ORDER — PANTOPRAZOLE SODIUM 40 MG/10ML
80 INJECTION, POWDER, LYOPHILIZED, FOR SOLUTION INTRAVENOUS ONCE
Status: COMPLETED | OUTPATIENT
Start: 2021-09-18 | End: 2021-09-18

## 2021-09-18 RX ORDER — ONDANSETRON 2 MG/ML
4 INJECTION INTRAMUSCULAR; INTRAVENOUS EVERY 6 HOURS PRN
Status: DISCONTINUED | OUTPATIENT
Start: 2021-09-18 | End: 2021-09-19 | Stop reason: HOSPADM

## 2021-09-18 RX ORDER — PROCHLORPERAZINE EDISYLATE 5 MG/ML
5 INJECTION INTRAMUSCULAR; INTRAVENOUS ONCE
Status: COMPLETED | OUTPATIENT
Start: 2021-09-18 | End: 2021-09-18

## 2021-09-18 RX ORDER — DEXTROSE MONOHYDRATE 25 G/50ML
25 INJECTION, SOLUTION INTRAVENOUS
Status: DISCONTINUED | OUTPATIENT
Start: 2021-09-18 | End: 2021-09-19 | Stop reason: HOSPADM

## 2021-09-18 RX ORDER — SODIUM CHLORIDE, SODIUM LACTATE, POTASSIUM CHLORIDE, CALCIUM CHLORIDE 600; 310; 30; 20 MG/100ML; MG/100ML; MG/100ML; MG/100ML
100 INJECTION, SOLUTION INTRAVENOUS CONTINUOUS
Status: DISCONTINUED | OUTPATIENT
Start: 2021-09-18 | End: 2021-09-19 | Stop reason: HOSPADM

## 2021-09-18 RX ORDER — SACCHAROMYCES BOULARDII 250 MG
250 CAPSULE ORAL 2 TIMES DAILY
Status: DISCONTINUED | OUTPATIENT
Start: 2021-09-18 | End: 2021-09-19 | Stop reason: HOSPADM

## 2021-09-18 RX ORDER — NICOTINE POLACRILEX 4 MG
15 LOZENGE BUCCAL
Status: DISCONTINUED | OUTPATIENT
Start: 2021-09-18 | End: 2021-09-19 | Stop reason: HOSPADM

## 2021-09-18 RX ADMIN — PROCHLORPERAZINE EDISYLATE 5 MG: 5 INJECTION INTRAMUSCULAR; INTRAVENOUS at 20:04

## 2021-09-18 RX ADMIN — PANTOPRAZOLE SODIUM 80 MG: 40 INJECTION, POWDER, FOR SOLUTION INTRAVENOUS at 19:03

## 2021-09-18 RX ADMIN — ONDANSETRON 4 MG: 2 INJECTION INTRAMUSCULAR; INTRAVENOUS at 18:09

## 2021-09-18 RX ADMIN — SODIUM CHLORIDE, POTASSIUM CHLORIDE, SODIUM LACTATE AND CALCIUM CHLORIDE 100 ML/HR: 600; 310; 30; 20 INJECTION, SOLUTION INTRAVENOUS at 21:28

## 2021-09-18 RX ADMIN — SODIUM CHLORIDE 2178 ML: 9 INJECTION, SOLUTION INTRAVENOUS at 18:09

## 2021-09-18 NOTE — ED PROVIDER NOTES
EMERGENCY DEPARTMENT ENCOUNTER    Pt Name: Ford Mukherjee Jr.  MRN: [id number]  Pt :   1995  Room Number:  HERMANN/HERMANN  Date of encounter:  2021  PCP: Provider, No Known  ED Provider: Willis Zavaleta PA-C    Historian: Patient      HPI:  Chief Complaint: Intractable nausea and vomiting, hematemesis/coffee-ground emesis        Context: Ford Mukherjee Jr. is a 26 y.o. male who presents to the ED c/o intractable nausea and vomiting that began this morning after a night of drinking last night.  Patient states he had multiple hard liquor drinks, awoke this morning with nausea and epigastric pain, forced himself to vomit, and noticed he was vomiting dark red/coffee-ground type emesis.  He and his girlfriend estimate greater than 10 episodes of emesis today.  He continues to complain of burning in his mid chest and epigastrium.  States bowel movements today were normal with no melena or hematochezia.  No prior history of GI bleed.  Past medical history is otherwise remarkable for insulin dependent diabetes with which he states he has been largely noncompliant.    PAST MEDICAL HISTORY  Past Medical History:   Diagnosis Date   • Diabetes mellitus (CMS/HCC)     PT DENIES THIS DX, PREVIOUS DOCUMENTED         PAST SURGICAL HISTORY  History reviewed. No pertinent surgical history.      FAMILY HISTORY  Family History   Problem Relation Age of Onset   • Diabetes Mother    • No Known Problems Father          SOCIAL HISTORY  Social History     Socioeconomic History   • Marital status: Single     Spouse name: Not on file   • Number of children: Not on file   • Years of education: Not on file   • Highest education level: Not on file   Tobacco Use   • Smoking status: Former Smoker   Substance and Sexual Activity   • Alcohol use: Yes     Comment: SOCIALLY   • Drug use: No   • Sexual activity: Yes     Partners: Female         ALLERGIES  Patient has no known allergies.        REVIEW OF SYSTEMS  Review of Systems    Constitutional: Positive for activity change and fatigue. Negative for chills, diaphoresis and fever.   HENT: Negative for congestion, rhinorrhea and sore throat.    Respiratory: Negative for cough, shortness of breath and wheezing.    Cardiovascular: Negative for chest pain, palpitations and leg swelling.   Gastrointestinal: Positive for abdominal pain, nausea and vomiting. Negative for blood in stool.   Genitourinary: Negative for dysuria, flank pain and hematuria.   Musculoskeletal: Negative for arthralgias, back pain, myalgias and neck pain.   Neurological: Negative for dizziness, seizures, syncope and headaches.   Hematological: Does not bruise/bleed easily.            PHYSICAL EXAM    I have reviewed the triage vital signs and nursing notes.    ED Triage Vitals [09/18/21 1658]   Temp Heart Rate Resp BP SpO2   98.9 °F (37.2 °C) 84 18 158/99 100 %      Temp src Heart Rate Source Patient Position BP Location FiO2 (%)   Oral Monitor Sitting Left arm --       Physical Exam  GENERAL:   General pallor noted.  Not toxic appearing, not in acute distress.  Hemodynamically stable and afebrile.    HENT: Nares patent.  Mild circumoral pallor, oropharynx is clear mucous membranes are slightly moist airways patent and uvula is midline.  EYES: No scleral icterus.  CV: Regular rhythm, regular rate.  RESPIRATORY: Normal effort.  No audible wheezes, rales or rhonchi.  ABDOMEN: Soft, mild tenderness in the epigastrium with no guarding or rebound tenderness.  Bowel sounds are normal with no palpable organomegaly or pulsatile masses.  MUSCULOSKELETAL: No deformities.   NEURO: Alert, moves all extremities, follows commands.  SKIN: Warm, dry, no rash visualized.        LAB RESULTS  Recent Results (from the past 24 hour(s))   Urinalysis With Microscopic If Indicated (No Culture) - Urine, Clean Catch    Collection Time: 09/18/21  5:55 PM    Specimen: Urine, Clean Catch   Result Value Ref Range    Color, UA Yellow Yellow, Straw     Appearance, UA Clear Clear    pH, UA 7.0 5.0 - 8.0    Specific Gravity, UA 1.034 (H) 1.001 - 1.030    Glucose,  mg/dL (2+) (A) Negative    Ketones, UA 80 mg/dL (3+) (A) Negative    Bilirubin, UA Negative Negative    Blood, UA Negative Negative    Protein, UA >=300 mg/dL (3+) (A) Negative    Leuk Esterase, UA Negative Negative    Nitrite, UA Negative Negative    Urobilinogen, UA 1.0 E.U./dL 0.2 - 1.0 E.U./dL   Urinalysis, Microscopic Only - Urine, Clean Catch    Collection Time: 09/18/21  5:55 PM    Specimen: Urine, Clean Catch   Result Value Ref Range    RBC, UA 7-12 (A) None Seen, 0-2 /HPF    WBC, UA 0-2 None Seen, 0-2 /HPF    Bacteria, UA None Seen None Seen, Trace /HPF    Squamous Epithelial Cells, UA 0-2 None Seen, 0-2 /HPF    Hyaline Casts, UA 0-6 0 - 6 /LPF    Methodology Manual Light Microscopy    POC Glucose Once    Collection Time: 09/18/21  5:58 PM    Specimen: Blood   Result Value Ref Range    Glucose 203 (H) 70 - 130 mg/dL   Comprehensive Metabolic Panel    Collection Time: 09/18/21  6:02 PM    Specimen: Blood   Result Value Ref Range    Glucose 226 (H) 65 - 99 mg/dL    BUN 14 6 - 20 mg/dL    Creatinine 0.71 (L) 0.76 - 1.27 mg/dL    Sodium 139 136 - 145 mmol/L    Potassium 3.5 3.5 - 5.2 mmol/L    Chloride 96 (L) 98 - 107 mmol/L    CO2 25.0 22.0 - 29.0 mmol/L    Calcium 10.2 8.6 - 10.5 mg/dL    Total Protein 8.5 6.0 - 8.5 g/dL    Albumin 5.60 (H) 3.50 - 5.20 g/dL    ALT (SGPT) 26 1 - 41 U/L    AST (SGOT) 14 1 - 40 U/L    Alkaline Phosphatase 67 39 - 117 U/L    Total Bilirubin 0.9 0.0 - 1.2 mg/dL    eGFR Non African Amer 135 >60 mL/min/1.73    eGFR  African Amer >150 >60 mL/min/1.73    Globulin 2.9 gm/dL    A/G Ratio 1.9 g/dL    BUN/Creatinine Ratio 19.7 7.0 - 25.0    Anion Gap 18.0 (H) 5.0 - 15.0 mmol/L   Lipase    Collection Time: 09/18/21  6:02 PM    Specimen: Blood   Result Value Ref Range    Lipase 14 13 - 60 U/L   Green Top (Gel)    Collection Time: 09/18/21  6:02 PM   Result Value Ref Range     Extra Tube Hold for add-ons.    Lavender Top    Collection Time: 09/18/21  6:02 PM   Result Value Ref Range    Extra Tube hold for add-on    Gold Top - SST    Collection Time: 09/18/21  6:02 PM   Result Value Ref Range    Extra Tube Hold for add-ons.    Light Blue Top    Collection Time: 09/18/21  6:02 PM   Result Value Ref Range    Extra Tube hold for add-on    CBC Auto Differential    Collection Time: 09/18/21  6:02 PM    Specimen: Blood   Result Value Ref Range    WBC 16.14 (H) 3.40 - 10.80 10*3/mm3    RBC 4.81 4.14 - 5.80 10*6/mm3    Hemoglobin 14.3 13.0 - 17.7 g/dL    Hematocrit 41.1 37.5 - 51.0 %    MCV 85.4 79.0 - 97.0 fL    MCH 29.7 26.6 - 33.0 pg    MCHC 34.8 31.5 - 35.7 g/dL    RDW 12.3 12.3 - 15.4 %    RDW-SD 37.8 37.0 - 54.0 fl    MPV 9.8 6.0 - 12.0 fL    Platelets 272 140 - 450 10*3/mm3    Neutrophil % 93.7 (H) 42.7 - 76.0 %    Lymphocyte % 3.1 (L) 19.6 - 45.3 %    Monocyte % 2.5 (L) 5.0 - 12.0 %    Eosinophil % 0.0 (L) 0.3 - 6.2 %    Basophil % 0.1 0.0 - 1.5 %    Immature Grans % 0.6 (H) 0.0 - 0.5 %    Neutrophils, Absolute 15.13 (H) 1.70 - 7.00 10*3/mm3    Lymphocytes, Absolute 0.50 (L) 0.70 - 3.10 10*3/mm3    Monocytes, Absolute 0.41 0.10 - 0.90 10*3/mm3    Eosinophils, Absolute 0.00 0.00 - 0.40 10*3/mm3    Basophils, Absolute 0.01 0.00 - 0.20 10*3/mm3    Immature Grans, Absolute 0.09 (H) 0.00 - 0.05 10*3/mm3    nRBC 0.0 0.0 - 0.2 /100 WBC   Beta Hydroxybutyrate Quantitative    Collection Time: 09/18/21  6:02 PM    Specimen: Blood   Result Value Ref Range    Beta-Hydroxybutyrate Quant 0.276 (H) 0.020 - 0.270 mmol/L   Blood Gas, Venous With Co-Ox    Collection Time: 09/18/21  6:04 PM    Specimen: Venous Blood   Result Value Ref Range    Site Nurse/Dr Draw     pH, Venous 7.415 (H) 7.310 - 7.410 pH Units    pCO2, Venous 45.8 41.0 - 51.0 mm Hg    pO2, Venous 24.7 (L) 27.0 - 53.0 mm Hg    HCO3, Venous 29.3 (H) 22.0 - 28.0 mmol/L    Base Excess, Venous 4.0 (H) -2.0 - 2.0 mmol/L    Hemoglobin, Blood Gas  14.9 13.5 - 17.5 g/dL    Oxyhemoglobin Venous 43.0 %    Methemoglobin Venous 0.9 %    Carboxyhemoglobin Venous 0.9 %    CO2 Content 30.8 22 - 33 mmol/L    Temperature 37.0 C    Barometric Pressure for Blood Gas      Modality Room Air     FIO2 21 %    Rate 0 Breaths/minute    PIP 0 cmH2O    IPAP 0     EPAP 0     Note     POC Glucose Once    Collection Time: 09/18/21  6:34 PM    Specimen: Blood   Result Value Ref Range    Glucose 207 (H) 70 - 130 mg/dL   Occult Blood Gastric / Duodenum - Gastric Contents,    Collection Time: 09/18/21  7:43 PM    Specimen: Gastric Contents   Result Value Ref Range    Gastroccult Positive (A) Negative       If labs were ordered, I independently reviewed the results.        RADIOLOGY  XR Chest 1 View    Result Date: 9/18/2021  CR Chest 1 Vw INDICATION: Vomiting. Hemoptysis today. Former smoker. COMPARISON:  None available. FINDINGS: Portable AP view(s) of the chest.  Cardiac silhouette is within normal limits. The vascularity is unremarkable. The lungs are clear. There is no pneumothorax.     1. Negative chest. Signer Name: Jamar Mcdaniel MD  Signed: 9/18/2021 8:14 PM  Workstation Name: [facility]  [facility]            PROCEDURES    Procedures    No orders to display       MEDICATIONS GIVEN IN ER    Medications   Sodium Chloride (PF) 0.9 % 10 mL (has no administration in time range)   pantoprazole (PROTONIX) injection 40 mg (has no administration in time range)   sodium chloride 0.9 % bolus 2,178 mL (0 mL Intravenous Stopped 9/18/21 2054)   ondansetron (ZOFRAN) injection 4 mg (4 mg Intravenous Given 9/18/21 1809)   pantoprazole (PROTONIX) injection 80 mg (80 mg Intravenous Given 9/18/21 1903)   prochlorperazine (COMPAZINE) injection 5 mg (5 mg Intravenous Given 9/18/21 2004)         PROGRESS, DATA ANALYSIS, CONSULTS, AND MEDICAL DECISION MAKING    All labs have been independently reviewed by me.  All radiology studies have been reviewed by me and the  radiologist dictating the report.   EKG's have been independently viewed and interpreted by me.          ED Course as of Sep 18 2103   Sat Sep 18, 2021   1759 Glucose(!): 203 [TG]   1835 Hemoglobin: 14.3 [TG]   1835 Hematocrit: 41.1 [TG]   1835 WBC(!): 16.14 [TG]      ED Course User Index  [TG] Willis Zavaleta PA-C       Patient had an episode of emesis here in the emergency department that I was able to visualize and indeed it is coffee-ground with dark maroon blood that is Gastroccult positive.  He was given Protonix here in the ED along with a 30ml/kg saline bolus, and Zofran for nausea which improved his symptoms significantly.  Could be Delmi-Qiu tears, but given volume of hematemesis, will seek admission to hospitalist service for an overnight observation and GI consult in the morning.      AS OF 21:03 EDT VITALS:    BP - 123/81  HR - 84  TEMP - 98.9 °F (37.2 °C) (Oral)  O2 SATS - 99%        DIAGNOSIS  Final diagnoses:   Acute upper GI bleeding   Dehydration   Hematemesis with nausea         DISPOSITION  Admit to hospitalist service, GI consulting           Willis Zavaleta PA-C  09/18/21 1935       Willis Zavaleta PA-C  09/18/21 1949       Willis Zavaleta PA-C  09/18/21 2103

## 2021-09-19 ENCOUNTER — READMISSION MANAGEMENT (OUTPATIENT)
Dept: CALL CENTER | Facility: HOSPITAL | Age: 26
End: 2021-09-19

## 2021-09-19 VITALS
BODY MASS INDEX: 22.4 KG/M2 | TEMPERATURE: 98 F | WEIGHT: 160 LBS | HEIGHT: 71 IN | DIASTOLIC BLOOD PRESSURE: 91 MMHG | SYSTOLIC BLOOD PRESSURE: 142 MMHG | OXYGEN SATURATION: 98 % | RESPIRATION RATE: 16 BRPM | HEART RATE: 72 BPM

## 2021-09-19 PROBLEM — U07.1 ASYMPTOMATIC COVID-19 VIRUS INFECTION: Status: ACTIVE | Noted: 2021-09-19

## 2021-09-19 LAB
ABO GROUP BLD: NORMAL
ANION GAP SERPL CALCULATED.3IONS-SCNC: 12 MMOL/L (ref 5–15)
BUN SERPL-MCNC: 10 MG/DL (ref 6–20)
BUN/CREAT SERPL: 15.2 (ref 7–25)
CALCIUM SPEC-SCNC: 9.1 MG/DL (ref 8.6–10.5)
CHLORIDE SERPL-SCNC: 101 MMOL/L (ref 98–107)
CO2 SERPL-SCNC: 25 MMOL/L (ref 22–29)
CREAT SERPL-MCNC: 0.66 MG/DL (ref 0.76–1.27)
CRP SERPL-MCNC: <0.3 MG/DL (ref 0–0.5)
D DIMER PPP FEU-MCNC: 0.3 MCGFEU/ML (ref 0–0.56)
D-LACTATE SERPL-SCNC: 1.2 MMOL/L (ref 0.5–2)
DEPRECATED RDW RBC AUTO: 39.8 FL (ref 37–54)
ERYTHROCYTE [DISTWIDTH] IN BLOOD BY AUTOMATED COUNT: 12.4 % (ref 12.3–15.4)
FERRITIN SERPL-MCNC: 266.7 NG/ML (ref 30–400)
GFR SERPL CREATININE-BSD FRML MDRD: 147 ML/MIN/1.73
GFR SERPL CREATININE-BSD FRML MDRD: >150 ML/MIN/1.73
GLUCOSE BLDC GLUCOMTR-MCNC: 211 MG/DL (ref 70–130)
GLUCOSE BLDC GLUCOMTR-MCNC: 279 MG/DL (ref 70–130)
GLUCOSE SERPL-MCNC: 151 MG/DL (ref 65–99)
HBA1C MFR BLD: 8.4 % (ref 4.8–5.6)
HCT VFR BLD AUTO: 38.4 % (ref 37.5–51)
HGB BLD-MCNC: 13.4 G/DL (ref 13–17.7)
LDH SERPL-CCNC: 189 U/L (ref 135–225)
MCH RBC QN AUTO: 30.3 PG (ref 26.6–33)
MCHC RBC AUTO-ENTMCNC: 34.9 G/DL (ref 31.5–35.7)
MCV RBC AUTO: 86.9 FL (ref 79–97)
PLATELET # BLD AUTO: 233 10*3/MM3 (ref 140–450)
PMV BLD AUTO: 10 FL (ref 6–12)
POTASSIUM SERPL-SCNC: 3 MMOL/L (ref 3.5–5.2)
RBC # BLD AUTO: 4.42 10*6/MM3 (ref 4.14–5.8)
RH BLD: POSITIVE
SARS-COV-2 RNA RESP QL NAA+PROBE: DETECTED
SODIUM SERPL-SCNC: 138 MMOL/L (ref 136–145)
WBC # BLD AUTO: 16.14 10*3/MM3 (ref 3.4–10.8)

## 2021-09-19 PROCEDURE — 80048 BASIC METABOLIC PNL TOTAL CA: CPT | Performed by: PHYSICIAN ASSISTANT

## 2021-09-19 PROCEDURE — 83605 ASSAY OF LACTIC ACID: CPT | Performed by: PHYSICIAN ASSISTANT

## 2021-09-19 PROCEDURE — 83036 HEMOGLOBIN GLYCOSYLATED A1C: CPT | Performed by: PHYSICIAN ASSISTANT

## 2021-09-19 PROCEDURE — 82962 GLUCOSE BLOOD TEST: CPT

## 2021-09-19 PROCEDURE — 96366 THER/PROPH/DIAG IV INF ADDON: CPT

## 2021-09-19 PROCEDURE — 86140 C-REACTIVE PROTEIN: CPT | Performed by: INTERNAL MEDICINE

## 2021-09-19 PROCEDURE — 85027 COMPLETE CBC AUTOMATED: CPT | Performed by: PHYSICIAN ASSISTANT

## 2021-09-19 PROCEDURE — G0378 HOSPITAL OBSERVATION PER HR: HCPCS

## 2021-09-19 PROCEDURE — 25010000003 POTASSIUM CHLORIDE 10 MEQ/100ML SOLUTION: Performed by: INTERNAL MEDICINE

## 2021-09-19 PROCEDURE — 99244 OFF/OP CNSLTJ NEW/EST MOD 40: CPT | Performed by: NURSE PRACTITIONER

## 2021-09-19 PROCEDURE — 96376 TX/PRO/DX INJ SAME DRUG ADON: CPT

## 2021-09-19 PROCEDURE — 99217 PR OBSERVATION CARE DISCHARGE MANAGEMENT: CPT | Performed by: INTERNAL MEDICINE

## 2021-09-19 PROCEDURE — 25010000002 ONDANSETRON PER 1 MG: Performed by: PHYSICIAN ASSISTANT

## 2021-09-19 PROCEDURE — 83615 LACTATE (LD) (LDH) ENZYME: CPT | Performed by: INTERNAL MEDICINE

## 2021-09-19 PROCEDURE — 96365 THER/PROPH/DIAG IV INF INIT: CPT

## 2021-09-19 PROCEDURE — 82728 ASSAY OF FERRITIN: CPT | Performed by: INTERNAL MEDICINE

## 2021-09-19 PROCEDURE — 85379 FIBRIN DEGRADATION QUANT: CPT | Performed by: INTERNAL MEDICINE

## 2021-09-19 RX ORDER — ONDANSETRON 4 MG/1
4 TABLET, FILM COATED ORAL EVERY 8 HOURS PRN
Qty: 15 TABLET | Refills: 0 | Status: SHIPPED | OUTPATIENT
Start: 2021-09-19 | End: 2022-03-06 | Stop reason: SDUPTHER

## 2021-09-19 RX ORDER — POTASSIUM CHLORIDE 750 MG/1
40 CAPSULE, EXTENDED RELEASE ORAL AS NEEDED
Status: DISCONTINUED | OUTPATIENT
Start: 2021-09-19 | End: 2021-09-19 | Stop reason: HOSPADM

## 2021-09-19 RX ORDER — OMEPRAZOLE 40 MG/1
40 CAPSULE, DELAYED RELEASE ORAL DAILY
Qty: 30 CAPSULE | Refills: 3 | Status: SHIPPED | OUTPATIENT
Start: 2021-09-19 | End: 2022-03-31

## 2021-09-19 RX ORDER — CALCIUM CARBONATE 200(500)MG
2 TABLET,CHEWABLE ORAL 3 TIMES DAILY PRN
Status: DISCONTINUED | OUTPATIENT
Start: 2021-09-19 | End: 2021-09-19 | Stop reason: HOSPADM

## 2021-09-19 RX ORDER — POTASSIUM CHLORIDE 1.5 G/1.77G
40 POWDER, FOR SOLUTION ORAL AS NEEDED
Status: DISCONTINUED | OUTPATIENT
Start: 2021-09-19 | End: 2021-09-19 | Stop reason: HOSPADM

## 2021-09-19 RX ORDER — POTASSIUM CHLORIDE 7.45 MG/ML
10 INJECTION INTRAVENOUS
Status: DISCONTINUED | OUTPATIENT
Start: 2021-09-19 | End: 2021-09-19 | Stop reason: HOSPADM

## 2021-09-19 RX ADMIN — PANTOPRAZOLE SODIUM 40 MG: 40 INJECTION, POWDER, FOR SOLUTION INTRAVENOUS at 08:13

## 2021-09-19 RX ADMIN — SODIUM CHLORIDE, PRESERVATIVE FREE 10 ML: 5 INJECTION INTRAVENOUS at 08:49

## 2021-09-19 RX ADMIN — POTASSIUM CHLORIDE 10 MEQ: 7.46 INJECTION, SOLUTION INTRAVENOUS at 08:48

## 2021-09-19 RX ADMIN — POTASSIUM CHLORIDE 10 MEQ: 7.46 INJECTION, SOLUTION INTRAVENOUS at 13:23

## 2021-09-19 RX ADMIN — SODIUM CHLORIDE, POTASSIUM CHLORIDE, SODIUM LACTATE AND CALCIUM CHLORIDE 100 ML/HR: 600; 310; 30; 20 INJECTION, SOLUTION INTRAVENOUS at 06:17

## 2021-09-19 RX ADMIN — ONDANSETRON 4 MG: 2 INJECTION INTRAMUSCULAR; INTRAVENOUS at 06:17

## 2021-09-19 RX ADMIN — Medication 250 MG: at 08:13

## 2021-09-19 RX ADMIN — POTASSIUM CHLORIDE 10 MEQ: 7.46 INJECTION, SOLUTION INTRAVENOUS at 11:01

## 2021-09-19 NOTE — H&P
[facility] Medicine Services  HISTORY AND PHYSICAL    Patient Name: Ford Mukherjee Jr.  : 1995  MRN: [id number]  Primary Care Physician: Provider, No Known  Date of admission: 2021    Subjective   Subjective     Chief Complaint:  Coffee ground emesis    HPI:  Ford Mukherjee Jr. is a 26 y.o. male with a past medical history significant for poorly controlled DM2. He presents today with complaints of intractable nausea and vomiting with coffee ground emesis. Acutely onset this morning. Patient volunteers that he was out drinking copious amount of hard liquor last night to celebrate a birthday and was awakened early this morning with nausea, vomiting, and epigastric pain. Patient estimates at least 10 episodes of emesis prior to arrival. States he is unable to tolerate PO intake, Was concerned and presented to ED for further evaluation and treatment. He denies a history of alcoholism. No tobacco or substance abuse. No history of GI bleed. Not taking NSAIDs or anticoagulation. No diarrhea, constipation, hematochezia, or melena. Patient also reports not taking metformin or insulin for almost a year and is requesting assistance in controlling is blood sugar outpatient. Patient reports having COVID-19 earlier this year but has since been vaccinated. Will admit to inpatient.      COVID Details:    Symptoms:    [x] NONE [] Fever []  Cough [] Shortness of breath [] Change in taste/smell      The patient has a COVID HM Topic on their chart, and they are fully vaccinated.      Review of Systems   Constitutional: Negative for chills, fatigue and fever.   HENT: Negative for congestion and trouble swallowing.    Eyes: Negative for photophobia and visual disturbance.   Respiratory: Negative for cough and shortness of breath.    Cardiovascular: Negative for chest pain and leg swelling.   Gastrointestinal: Positive for abdominal pain, nausea and vomiting. Negative for blood in stool and diarrhea.    Endocrine: Negative for cold intolerance and heat intolerance.   Genitourinary: Negative for dysuria and flank pain.   Musculoskeletal: Negative for back pain and gait problem.   Skin: Negative for pallor and rash.   Allergic/Immunologic: Negative for immunocompromised state.   Neurological: Negative for dizziness, weakness and headaches.   Hematological: Negative for adenopathy.   Psychiatric/Behavioral: Negative for agitation and confusion.        All other systems reviewed and are negative.     Personal History     Past Medical History:   Diagnosis Date   • Diabetes mellitus (CMS/HCC)     PT DENIES THIS DX, PREVIOUS DOCUMENTED       History reviewed. No pertinent surgical history.    Family History: family history includes Diabetes in his mother; No Known Problems in his father. Otherwise pertinent FHx was reviewed and unremarkable.     Social History:  reports that he has quit smoking. He does not have any smokeless tobacco history on file. He reports current alcohol use. He reports that he does not use drugs.  Social History     Social History Narrative   • Not on file       Medications:  Insulin Glargine, Insulin Pen Needle, cefTRIAXone, freestyle, glucose blood, insulin detemir, metFORMIN, and saccharomyces boulardii    No Known Allergies    Objective   Objective     Vital Signs:   Temp:  [98.9 °F (37.2 °C)] 98.9 °F (37.2 °C)  Heart Rate:  [84] 84  Resp:  [18] 18  BP: (123-158)/(77-99) 123/81    Physical Exam   Constitutional: Awake, alert  Eyes: PERRLA, sclerae anicteric, no conjunctival injection  HENT: NCAT, mucous membranes moist  Neck: Supple, no thyromegaly, no lymphadenopathy, trachea midline  Respiratory: Clear to auscultation bilaterally, nonlabored respirations   Cardiovascular: RRR, no murmurs, rubs, or gallops, palpable pedal pulses bilaterally  Gastrointestinal: Positive bowel sounds, soft, nontender, nondistended  Musculoskeletal: No bilateral ankle edema, no clubbing or cyanosis to  extremities  Psychiatric: Appropriate affect, cooperative  Neurologic: Oriented x 3, strength symmetric in all extremities, Cranial Nerves grossly intact to confrontation, speech clear  Skin: No rashes      Results Reviewed:  I have personally reviewed most recent indicated data and agree with findings including:  [x]  Laboratory  [x]  Radiology  []  EKG/Telemetry  []  Pathology  []  Cardiac/Vascular Studies  [x]  Old records  []  Other:  Most pertinent findings include: vitals stable. Glucose 226. betahydroxybutyrate 0.276. WBC 16.16. heme occult positive. CXR clear.      LAB RESULTS:      Lab 09/18/21  1802   WBC 16.14*   HEMOGLOBIN 14.3   HEMATOCRIT 41.1   PLATELETS 272   NEUTROS ABS 15.13*   IMMATURE GRANS (ABS) 0.09*   LYMPHS ABS 0.50*   MONOS ABS 0.41   EOS ABS 0.00   MCV 85.4         Lab 09/18/21  1802   SODIUM 139   POTASSIUM 3.5   CHLORIDE 96*   CO2 25.0   ANION GAP 18.0*   BUN 14   CREATININE 0.71*   GLUCOSE 226*   CALCIUM 10.2         Lab 09/18/21  1802   TOTAL PROTEIN 8.5   ALBUMIN 5.60*   GLOBULIN 2.9   ALT (SGPT) 26   AST (SGOT) 14   BILIRUBIN 0.9   ALK PHOS 67   LIPASE 14                     Lab 09/18/21  1804   FIO2 21   CARBOXYHEMOGLOBIN (VENOUS) 0.9     Brief Urine Lab Results  (Last result in the past 365 days)      Color   Clarity   Blood   Leuk Est   Nitrite   Protein   CREAT   Urine HCG        09/18/21 1755 Yellow Clear Negative Negative Negative >=300 mg/dL (3+)             Microbiology Results (last 10 days)     ** No results found for the last 240 hours. **          CXR personally reviewed showing no acute chest disease. Agree with interpretation.      Assessment/Plan   Assessment & Plan       Acute upper GI bleeding    Coffee ground emesis    Type 2 diabetes mellitus with hyperglycemia, without long-term current use of insulin (CMS/[facility])      1. GI Bleed:  - hemodynamically stable  - H/H at baseline. Type and screen and transfuse as needed  - started on protonix 40 mg q 12 hours  - CXR  without free air  - IVF  - NPO  - consult to GI  - am labs      2. DM2 with hyperglycemia:  - do not suspect DKA, just dehydration at this time.pH favorable.  - will continue to hydrate aggressively  - check A1c  - SSI with scheduled accu checks  - consult to diabetic educator    3. Leukocytosis:  - afebrile. CXR clear  - suspect volume depletion  - check lactic acid and procalcitonin      DVT prophylaxis: mechanical    CODE STATUS:  Full code  Level Of Support Discussed With: Patient  Code Status: CPR  Medical Interventions (Level of Support Prior to Arrest): Full      This note has been completed as part of a split-shared workflow.     Electronically signed by Shaylee St PA-C, 09/18/21, 8:39 PM EDT.      Attending   Admission Attestation       I have seen and examined the patient, performing an independent face-to-face diagnostic evaluation with plan of care reviewed and developed with the advanced practice clinician (APC).      Brief Summary Statement:   Ford Mukherjee Jr. is a 26 y.o. male presenting with ongoing vomiting following a night of heavy drinking last night. Initially vomitus was clear but as he continued vomit it became bloody. Upon my arrival he says he feels much better.    Remainder of detailed HPI is as noted by APC and has been reviewed and/or edited by me for completeness.    Attending Physical Exam:  Constitutional: Awake, alert  Eyes: PERRLA, sclerae anicteric, no conjunctival injection  HENT: NCAT, mucous membranes moist  Neck: Supple, no thyromegaly, no lymphadenopathy, trachea midline  Respiratory: Clear to auscultation bilaterally, nonlabored respirations   Cardiovascular: RRR, no murmurs, rubs, or gallops, palpable pedal pulses bilaterally  Gastrointestinal: Positive bowel sounds, soft, nontender, nondistended  Musculoskeletal: No bilateral ankle edema, no clubbing or cyanosis to extremities  Psychiatric: Appropriate affect, cooperative  Neurologic: Oriented x 3, strength symmetric  in all extremities, Cranial Nerves grossly intact to confrontation, speech clear  Skin: No rashes     CXR personally reviewed showing no acute chest disease. Agree with interpretation.    Brief Assessment/Plan :    27 y/o male presenting with coffee ground emesis starting today after night of heavy drinking last night.   --Suspect Delmi Wies tear. IV PPI. IVF. GI consult in am though ultimately if does well overnight may not need EGD.  --Has been off of his diabetes regimen for about 2 years. Will also try to get him back on appropriate diabetes regimen prior to his discharge.     See detailed assessment and plan developed with [facility] which I have reviewed and/or edited for completeness.    Admission Status: I believe that this patient meets OBSERVATION status, however if further evaluation or treatment plans warrant, status may change.  Based upon current information, I predict patient's care encounter to be less than or equal to 2 midnights.      Willow Maria II, DO  09/18/21

## 2021-09-19 NOTE — DISCHARGE SUMMARY
[facility] Medicine Services  DISCHARGE SUMMARY    Patient Name: Ford Mukherjee Jr.  : 1995  MRN: [id number]    Date of Admission: 2021  5:39 PM  Date of Discharge:  2021  Primary Care Physician: Provider, No Known    Consults     Date and Time Order Name Status Description    2021  9:22 PM Inpatient Gastroenterology Consult            Hospital Course     Presenting Problem:   Acute upper GI bleeding [K92.2]    Active Hospital Problems    Diagnosis  POA   • **Acute upper GI bleeding [K92.2]  Yes   • Asymptomatic COVID-19 virus infection [U07.1]  Yes   • Coffee ground emesis [K92.0]  Yes   • Type 2 diabetes mellitus with hyperglycemia, without long-term current use of insulin (CMS/[facility]) [E11.65]  Yes      Resolved Hospital Problems   No resolved problems to display.          Hospital Course:  Ford Mukherjee Jr. is a 26 y.o. male with a past medical history significant for poorly controlled DM2. He presents today with complaints of intractable nausea and vomiting with coffee ground emesis.     GI Bleed:  -Suspected GI bleeding secondary to nausea, vomiting.  -Nausea and vomiting resolved.  -Patient is appropriate for discharge, tolerating p.o.  -No further episodes of hematemesis during hospital stay.  Hemoglobin remained stable.  -We will discharge with Zofran.     Covid positive  - asymptomatic, sating 100% room air.   - CXR negative  - will not treat unless patient develops symptoms, becomes hypoxic  -Patient has been counseled to remain in quarantine for 10 days as he is asymptomatic.     Dehydration, lactic acidosis  -Resolved with fluids.  Now tolerating p.o.         Discharge Follow Up Recommendations for outpatient labs/diagnostics:  Follow-up with PCP in 1 to 2 weeks    Day of Discharge     HPI:   Patient is doing well this morning.  Requesting food.  Tolerated lunch.  Nausea vomiting has resolved.  He feels ready to go home.  No further hematemesis.    Review of  Systems  General: denies fevers or chills  CV: denies chest pain  Resp: denies shortness of breath  Abd: denies abd pain, nausea      Vital Signs:   Temp:  [98.4 °F (36.9 °C)-98.9 °F (37.2 °C)] 98.4 °F (36.9 °C)  Heart Rate:  [] 88  Resp:  [16-18] 16  BP: (123-158)/(77-99) 150/95     Physical Exam:  Constitutional: No acute distress, awake, alert  Respiratory: Clear to auscultation bilaterally, respiratory effort normal   Cardiovascular: RRR, no murmurs, rubs, or gallops  Gastrointestinal: Positive bowel sounds, soft, nontender, nondistended  Musculoskeletal: No bilateral ankle edema  Psychiatric: Appropriate affect, cooperative  Neurologic: Oriented x 3, no focal neurological deficits  Skin: No rashes      Pertinent  and/or Most Recent Results     LAB RESULTS:      Lab 09/19/21  0622 09/19/21  0039 09/18/21  2143 09/18/21  1802   WBC 16.14*  --   --  16.14*   HEMOGLOBIN 13.4  --   --  14.3   HEMATOCRIT 38.4  --   --  41.1   PLATELETS 233  --   --  272   NEUTROS ABS  --   --   --  15.13*   IMMATURE GRANS (ABS)  --   --   --  0.09*   LYMPHS ABS  --   --   --  0.50*   MONOS ABS  --   --   --  0.41   EOS ABS  --   --   --  0.00   MCV 86.9  --   --  85.4   CRP <0.30  --   --   --    PROCALCITONIN  --   --   --  0.06   LACTATE  --  1.2 2.5*  --      --   --   --    D DIMER QUANT 0.30  --   --   --          Lab 09/19/21  0622 09/18/21  1802   SODIUM 138 139   POTASSIUM 3.0* 3.5   CHLORIDE 101 96*   CO2 25.0 25.0   ANION GAP 12.0 18.0*   BUN 10 14   CREATININE 0.66* 0.71*   GLUCOSE 151* 226*   CALCIUM 9.1 10.2   HEMOGLOBIN A1C 8.40*  --          Lab 09/18/21  1802   TOTAL PROTEIN 8.5   ALBUMIN 5.60*   GLOBULIN 2.9   ALT (SGPT) 26   AST (SGOT) 14   BILIRUBIN 0.9   ALK PHOS 67   LIPASE 14                 Lab 09/19/21  0622 09/18/21  2143 09/18/21 2020 09/18/21 2020   FERRITIN 266.70  --   --   --    ABO TYPING  --  A   < > A   RH TYPING  --  Positive   < > Positive   ANTIBODY SCREEN  --   --   --  Negative     < > = values in this interval not displayed.         Lab 09/18/21  1804   FIO2 21   CARBOXYHEMOGLOBIN (VENOUS) 0.9     Brief Urine Lab Results  (Last result in the past 365 days)      Color   Clarity   Blood   Leuk Est   Nitrite   Protein   CREAT   Urine HCG        09/18/21 1755 Yellow Clear Negative Negative Negative >=300 mg/dL (3+)             Microbiology Results (last 10 days)     Procedure Component Value - Date/Time    COVID PRE-OP / PRE-PROCEDURE SCREENING ORDER (NO ISOLATION) - Swab, Nasopharynx [[id number]]  (Abnormal) Collected: 09/18/21 2020    Lab Status: Final result Specimen: Swab from Nasopharynx Updated: 09/19/21 0329    Narrative:      The following orders were created for panel order COVID PRE-OP / PRE-PROCEDURE SCREENING ORDER (NO ISOLATION) - Swab, Nasopharynx.  Procedure                               Abnormality         Status                     ---------                               -----------         ------                     COVID-19,CEPHEID/REI,LE...[[id number]]  Abnormal            Final result                 Please view results for these tests on the individual orders.    COVID-19,CEPHEID/REI,CASEY IN-HOUSE(OR EMERGENT/ADD-ON),NP SWAB IN TRANSPORT MEDIA 3-4 HR TAT - Swab, Nasopharynx [[id number]]  (Abnormal) Collected: 09/18/21 2020    Lab Status: Final result Specimen: Swab from Nasopharynx Updated: 09/19/21 0329     COVID19 Detected    Narrative:      Fact sheet for providers: https://www.fda.gov/media/919035/download     Fact sheet for patients: https://www.fda.gov/media/722603/download  Fact sheet for providers: https://www.fda.gov/media/139551/download     Fact sheet for patients: https://www.fda.gov/media/851117/download          XR Chest 1 View    Result Date: 9/18/2021  CR Chest 1 Vw INDICATION: Vomiting. Hemoptysis today. Former smoker. COMPARISON:  None available. FINDINGS: Portable AP view(s) of the chest.  Cardiac silhouette is within normal limits. The vascularity is  unremarkable. The lungs are clear. There is no pneumothorax.     1. Negative chest. Signer Name: Jamar Mcdaniel MD  Signed: 9/18/2021 8:14 PM  Workstation Name: [email or web address]  [facility]                  Plan for Follow-up of Pending Labs/Results:     Discharge Details        Discharge Medications      New Medications      Instructions Start Date   ondansetron 4 MG tablet  Commonly known as: Zofran   4 mg, Oral, Every 8 Hours PRN         Continue These Medications      Instructions Start Date   freestyle lancets   Use 4 lancets daily to monitor glucose      glucose blood test strip   Use as instructed      insulin detemir 100 UNIT/ML injection  Commonly known as: LEVEMIR   10 Units, Subcutaneous, Nightly      Insulin Glargine 100 UNIT/ML injection pen  Commonly known as: LANTUS SOLOSTAR   10 Units, Subcutaneous, Nightly      Insulin Pen Needle 31G X 5 MM misc  Commonly known as: B-D UF III MINI PEN NEEDLES   1 pen, Does not apply, Nightly      metFORMIN 1000 MG tablet  Commonly known as: GLUCOPHAGE   1,000 mg, Oral, 2 Times Daily With Meals             No Known Allergies      Discharge Disposition:  Home or Self Care    Diet:  Hospital:  Diet Order   Procedures   • Diet Regular; Consistent Carbohydrate       Activity:  Activity Instructions     Activity as Tolerated            Restrictions or Other Recommendations:         CODE STATUS:    Code Status and Medical Interventions:   Ordered at: 09/18/21 2011     Level Of Support Discussed With:    Patient     Code Status:    CPR     Medical Interventions (Level of Support Prior to Arrest):    Full       No future appointments.    Additional Instructions for the Follow-ups that You Need to Schedule     Discharge Follow-up with PCP   As directed       Currently Documented PCP:    Provider, No Known    PCP Phone Number:    [phone]     Follow Up Details: 1-2 weeks                     Cheyenne Yoon MD  09/19/21      Time Spent on Discharge:  I  spent  35  minutes on this discharge activity which included: face-to-face encounter with the patient, reviewing the data in the system, coordination of the care with the nursing staff as well as consultants, documentation, and entering orders.

## 2021-09-19 NOTE — CONSULTS
[facility]    Referring Provider: Provider, No Known    PCP: Provider, No Known    Reason for Consultation: Hematemesis    Chief complaint: Nausea, vomiting, hematemesis    History of present illness:    Ford Mukherjee Jr. is a 26 y.o. male who is admitted with a 24-hour onset of worsening hematemesis.  Patient notes he was at his usual state of health until he awoke yesterday morning with nausea and vomiting; emesis described as being coffee-ground in nature without fresh, red blood.  Patient notes he was hung over after he got out celebrating a friend's birthday where he consumed a copious amount of alcohol.  Does endorse prior episodes of coffee-ground emesis in the past.  Patient reports a history of type 2 diabetes for the past 5 to 6 years.  Patient also reports he has been experiencing frequent belching and heartburn-like symptoms over the past few weeks.  At time of exam, patient reports heartburn-like symptoms and was requesting additional Tums.  Denies any abdominal pain, shortness of breath, chest pain, fever/chills, or sick contacts.  Denies use of NSAIDs.  Occasional alcohol use.  Hemoglobin stable at time of admission; COVID-19 swab positive though patient asymptomatic. Past medical, surgical, social, and family histories are reviewed for accuracy.  No documented alleviating or exacerbating factors.  Does not endorse pain at time of exam.    Allergies:  Patient has no known allergies.    Scheduled Meds:  insulin lispro, 0-7 Units, Subcutaneous, TID AC  pantoprazole, 40 mg, Intravenous, Q12H  saccharomyces boulardii, 250 mg, Oral, BID  sodium chloride, 10 mL, Intravenous, Q12H         Infusions:  lactated ringers, 100 mL/hr, Last Rate: 100 mL/hr (09/19/21 7745)        PRN Meds:  •  acetaminophen  •  dextrose  •  dextrose  •  glucagon (human recombinant)  •  melatonin  •  ondansetron  •  potassium chloride **OR** potassium chloride **OR** potassium chloride  •  Sodium Chloride (PF)  •   sodium chloride    Home Meds:  Medications Prior to Admission   Medication Sig Dispense Refill Last Dose   • glucose blood test strip Use as instructed 100 each 0    • insulin detemir (LEVEMIR) 100 UNIT/ML injection Inject 10 Units under the skin Every Night. 10 mL 0 More than a month at Unknown time   • Insulin Glargine (LANTUS SOLOSTAR) 100 UNIT/ML injection pen Inject 10 Units under the skin Every Night. 1 pen 0 More than a month at Unknown time   • Insulin Pen Needle (B-D UF III MINI PEN NEEDLES) 31G X 5 MM misc 1 pen Every Night. 30 each 0 More than a month at Unknown time   • Lancets (FREESTYLE) lancets Use 4 lancets daily to monitor glucose 100 each 0 More than a month at Unknown time   • metFORMIN (GLUCOPHAGE) 1000 MG tablet Take 1,000 mg by mouth 2 (Two) Times a Day With Meals.   More than a month at Unknown time       ROS: Review of Systems   Constitutional: Positive for activity change, appetite change and fatigue. Negative for chills, diaphoresis, fever and unexpected weight change.   HENT: Negative for sore throat, trouble swallowing and voice change.    Respiratory: Negative for apnea, cough, choking, chest tightness, shortness of breath, wheezing and stridor.    Cardiovascular: Negative for chest pain, palpitations and leg swelling.   Gastrointestinal: Positive for nausea and vomiting. Negative for abdominal distention, abdominal pain, anal bleeding, blood in stool, constipation, diarrhea and rectal pain.        +Belching, coffee ground emesis    Endocrine: Negative.    Genitourinary: Negative.    Musculoskeletal: Negative.    Skin: Negative for color change, pallor, rash and wound.   Allergic/Immunologic: Negative.    Neurological: Negative.    Hematological: Negative.    Psychiatric/Behavioral: Negative.    All other systems reviewed and are negative.      PAST MED HX:  Past Medical History:   Diagnosis Date   • Diabetes mellitus (CMS/[facility])     PT DENIES THIS DX, PREVIOUS DOCUMENTED       PAST SURG  "HX:  History reviewed. No pertinent surgical history.    FAM HX:  Family History   Problem Relation Age of Onset   • Diabetes Mother    • No Known Problems Father        SOC HX:  Social History     Socioeconomic History   • Marital status: Single     Spouse name: Not on file   • Number of children: Not on file   • Years of education: Not on file   • Highest education level: Not on file   Tobacco Use   • Smoking status: Former Smoker   Substance and Sexual Activity   • Alcohol use: Yes     Comment: SOCIALLY   • Drug use: No   • Sexual activity: Yes     Partners: Female       PHYSICAL EXAM  Please note, due to the COVID-19 global viral pandemic, patient is evaluated with direct visualization from [facility] due to his active COVID-19 infection and ongoing efforts to conserve PPE.    /95 (BP Location: Left arm, Patient Position: Lying)   Pulse 88   Temp 98.4 °F (36.9 °C) (Oral)   Resp 16   Ht 180.3 cm (71\")   Wt 72.6 kg (160 lb)   SpO2 100%   BMI 22.32 kg/m²   Wt Readings from Last 3 Encounters:   09/19/21 72.6 kg (160 lb)   02/12/17 79.8 kg (176 lb)   ,body mass index is 22.32 kg/m².  Physical Exam  Vitals and nursing note reviewed.   Constitutional:       General: He is not in acute distress.     Appearance: He is normal weight. He is not ill-appearing.   HENT:      Head: Normocephalic and atraumatic.   Eyes:      General: No scleral icterus.     Conjunctiva/sclera: Conjunctivae normal.   Cardiovascular:      Rate and Rhythm: Normal rate and regular rhythm.      Comments: Vitals and telemetry reviewed; B/P: 150/95.  HR: 88 bpm.  NSR.   Pulmonary:      Comments: Even air movement appreciated.  No audible SOB or wheezing.   Abdominal:      Palpations: Abdomen is soft.      Comments: Non-tender to palpation, not distended.    Musculoskeletal:         General: Normal range of motion.      Right lower leg: No edema.      Left lower leg: No edema.   Skin:     General: Skin is warm and dry.      Coloration: Skin " is not jaundiced or pale.      Findings: No bruising.   Neurological:      General: No focal deficit present.      Mental Status: He is alert and oriented to person, place, and time.   Psychiatric:         Mood and Affect: Mood normal.         Behavior: Behavior normal.         Thought Content: Thought content normal.         Judgment: Judgment normal.         Results Review:   I reviewed the patient's new clinical results.  I reviewed the patient's new imaging results and agree with the interpretation.  I personally viewed and interpreted the patient's EKG/Telemetry data    Lab Results   Component Value Date    WBC 16.14 (H) 09/19/2021    HGB 13.4 09/19/2021    HGB 14.3 09/18/2021    HGB 15.9 02/20/2017    HCT 38.4 09/19/2021    MCV 86.9 09/19/2021     09/19/2021       No results found for: INR    Lab Results   Component Value Date    GLUCOSE 151 (H) 09/19/2021    BUN 10 09/19/2021    CREATININE 0.66 (L) 09/19/2021    EGFRIFNONA 147 09/19/2021    EGFRIFAFRI >150 09/19/2021    BCR 15.2 09/19/2021     09/19/2021    K 3.0 (L) 09/19/2021    CO2 25.0 09/19/2021    CALCIUM 9.1 09/19/2021    ALBUMIN 5.60 (H) 09/18/2021    ALKPHOS 67 09/18/2021    BILITOT 0.9 09/18/2021    ALT 26 09/18/2021    AST 14 09/18/2021     XR Chest 1 View  Result Date: 9/18/2021  CR Chest 1 Vw INDICATION: Vomiting. Hemoptysis today. Former smoker. COMPARISON:  None available. FINDINGS: Portable AP view(s) of the chest.  Cardiac silhouette is within normal limits. The vascularity is unremarkable. The lungs are clear. There is no pneumothorax.     1. Negative chest. Signer Name: Jamar Mcdaniel MD  Signed: 9/18/2021 8:14 PM  Workstation Name: [facility]  [facility] [place] [facility] [id number]   Date Value Ref Range Status   09/18/2021 Detected (C) Not Detected - Ref. Range Final     ASSESSMENTS/PLANS  1.  Hematemesis with nausea, hemoglobin stable  2.  Type 2 diabetes mellitus, poorly controlled  3.  COVID-19 positive,  asymptomatic    Ford Mukherjee Jr. is a 26 y.o. male admitted to hospital with hematemesis following a celebratory night out drinking for his birthday.  Patient also with longstanding history of poorly controlled type 2 diabetes.  At time of exam, hematemesis resolved.  Suspect attributed patient's diabetes in form of gastroparesis.  Recommend conservative management for now given COVID-19 status; begin PPI and gingerroot.  Will need to follow-up with gastroenterology at discharge as well as endocrinology.    >>> At time of exam, patient's GI complaints have resolved.  Suspect source of hematemesis related to patient's poorly controlled diabetes i.e. gastroparesis and GERD.  >>> Recommend beginning PPI therapy with daily omeprazole; order placed.   >>> May use over-the-counter gingerroot 3 times daily  >>> Ambulatory for gastroenterology made to evaluate response to therapy  >>> Patient encouraged to follow-up with endocrinology for additional assistance in managing his diabetes.    Okay for discharge from GI stand point. Referral to GI made.  Will sign off.     I discussed the patient's findings and my recommendations with patient, family and nursing staff.    Bharathi Frost, APRN  09/19/21  14:41 EDT

## 2021-09-19 NOTE — NURSING NOTE
Patient admitted to floor. No current complaints of pain/nausea. Normal sinus/sinus tach. Room air. Patient to be moved to another room due to positive COVID test.

## 2021-09-19 NOTE — OUTREACH NOTE
Prep Survey      Responses   [facility] discharged from?  [facility]   Is LACE score < 7 ?  Yes   Emergency Room discharge w/ pulse ox?  No   Eligibility  Readm Mgmt   Discharge diagnosis  Acute upper GI bleeding,    Covid positive   Does the patient have one of the following disease processes/diagnoses(primary or secondary)?  COVID-19   Does the patient have Home health ordered?  No   Is there a DME ordered?  No   Prep survey completed?  Yes          Liss Hassan RN

## 2021-09-20 ENCOUNTER — READMISSION MANAGEMENT (OUTPATIENT)
Dept: CALL CENTER | Facility: HOSPITAL | Age: 26
End: 2021-09-20

## 2021-09-20 NOTE — OUTREACH NOTE
COVID-19 Week 1 Survey      Responses   [facility] patient discharged from?  [facility]   Does the patient have one of the following disease processes/diagnoses(primary or secondary)?  COVID-19   COVID-19 underlying condition?  None   Call Number  Call 1   Week 1 Call successful?  Yes   Call start time  1231   Call end time  1233   Discharge diagnosis  Acute upper GI bleeding,    Covid positive   Prescription comments  nausea more than covid related symptoms    Comments regarding appointments  no apts    Psychosocial issues?  No   What is the patient's perception of their health status since discharge?  Improving [trouble with hiccups ]   Does the patient have any of the following symptoms?  None   Pulse Ox monitoring  None   COVID-19 call completed?  Yes          Lori Villanueva RN

## 2021-09-21 ENCOUNTER — READMISSION MANAGEMENT (OUTPATIENT)
Dept: CALL CENTER | Facility: HOSPITAL | Age: 26
End: 2021-09-21

## 2021-09-21 NOTE — OUTREACH NOTE
COVID-19 Week 1 Survey      Responses   [facility] patient discharged from?  [facility]   Does the patient have one of the following disease processes/diagnoses(primary or secondary)?  COVID-19   COVID-19 underlying condition?  None   Call Number  Call 2   Week 1 Call successful?  No   Discharge diagnosis  Acute upper GI bleeding,    Covid positive          Tigist Sánchez RN

## 2021-09-22 ENCOUNTER — READMISSION MANAGEMENT (OUTPATIENT)
Dept: CALL CENTER | Facility: HOSPITAL | Age: 26
End: 2021-09-22

## 2021-09-22 NOTE — OUTREACH NOTE
COVID-19 Week 1 Survey      Responses   [facility] patient discharged from?  [facility]   Does the patient have one of the following disease processes/diagnoses(primary or secondary)?  COVID-19   COVID-19 underlying condition?  None   Call Number  Call 3   Week 1 Call successful?  Yes   Call start time  0916   Call end time  0920   Discharge diagnosis  Acute upper GI bleeding,    Covid positive   Meds reviewed with patient/caregiver?  Yes   Is the patient having any side effects they believe may be caused by any medication additions or changes?  No   Does the patient have all medications ordered at discharge?  Yes   Is the patient taking all medications as directed (includes completed medication regime)?  Yes   Comments regarding appointments  no apts    Does the patient have an appointment with their PCP or specialist within 7 days of discharge?  No   What is preventing the patient from scheduling follow up appointments within 7 days of discharge?  Haven't had time   Psychosocial issues?  No   Did the patient receive a copy of their discharge instructions?  Yes   Did the patient receive a copy of COVID-19 specific instructions?  Yes   Nursing interventions  Reviewed instructions with patient   What is the patient's perception of their health status since discharge?  Improving   Does the patient have any of the following symptoms?  None   Pulse Ox monitoring  None   If the patient is a current smoker, are they able to teach back resources for cessation?  Not a smoker   Is the patient/caregiver able to teach back the hierarchy of who to call/visit for symptoms/problems? PCP, Specialist, Home health nurse, Urgent Care, ED, 911  Yes   COVID-19 call completed?  Yes   Wrap up additional comments  States he is in Quarantine at present. States he will call [facility] to get established with PCP.           Aurora Bruno RN

## 2021-09-27 ENCOUNTER — READMISSION MANAGEMENT (OUTPATIENT)
Dept: CALL CENTER | Facility: HOSPITAL | Age: 26
End: 2021-09-27

## 2021-10-21 ENCOUNTER — OFFICE VISIT (OUTPATIENT)
Dept: GASTROENTEROLOGY | Facility: CLINIC | Age: 26
End: 2021-10-21

## 2021-10-21 VITALS
HEIGHT: 71 IN | SYSTOLIC BLOOD PRESSURE: 139 MMHG | HEART RATE: 84 BPM | BODY MASS INDEX: 22.18 KG/M2 | DIASTOLIC BLOOD PRESSURE: 90 MMHG | TEMPERATURE: 97.3 F | WEIGHT: 158.4 LBS

## 2021-10-21 DIAGNOSIS — K92.0 COFFEE GROUND EMESIS: Primary | ICD-10-CM

## 2021-10-21 PROCEDURE — 99212 OFFICE O/P EST SF 10 MIN: CPT | Performed by: NURSE PRACTITIONER

## 2021-10-21 NOTE — PROGRESS NOTES
GASTROENTEROLOGY OFFICE NOTE  Ford Mukherjee Jr.  [id number]  1995    CARE TEAM  Patient Care Team:  Provider, No Known as PCP - General  Rich Peralta MD (Family Medicine)    Referring Provider: Rich Peralta MD    Chief Complaint   Patient presents with   • GI Bleeding     Clearing up   • Nausea     clearing up        HISTORY OF PRESENT ILLNESS:  Patient is a 26-year-old male with a past medical history significant for uncontrolled diabetes.  He was admitted to [facility] overnight in September for intractable nausea and vomiting with coffee-ground emesis that began the morning after a night of drinking heavily.  He had significant heartburn.  His nausea and vomiting resolved.  He had no further hematemesis.  He was started on omeprazole daily.  He reports that upon his discharge home he only took the omeprazole for day or 2.  He resumed his normal diet.  He said no further nausea or vomiting.  He has no GI complaints today.    PAST MEDICAL HISTORY  Past Medical History:   Diagnosis Date   • Diabetes mellitus (HCC)     PT DENIES THIS DX, PREVIOUS DOCUMENTED   • GI (gastrointestinal bleed)         PAST SURGICAL HISTORY  History reviewed. No pertinent surgical history.     MEDICATIONS:    Current Outpatient Medications:   •  glucose blood test strip, Use as instructed, Disp: 100 each, Rfl: 0  •  insulin detemir (LEVEMIR) 100 UNIT/ML injection, Inject 10 Units under the skin Every Night., Disp: 10 mL, Rfl: 0  •  Insulin Glargine (LANTUS SOLOSTAR) 100 UNIT/ML injection pen, Inject 10 Units under the skin Every Night., Disp: 1 pen, Rfl: 0  •  Insulin Pen Needle (B-D UF III MINI PEN NEEDLES) 31G X 5 MM misc, 1 pen Every Night., Disp: 30 each, Rfl: 0  •  Lancets (FREESTYLE) lancets, Use 4 lancets daily to monitor glucose, Disp: 100 each, Rfl: 0  •  metFORMIN (GLUCOPHAGE) 1000 MG tablet, Take 1,000 mg by mouth 2 (Two) Times a Day With Meals., Disp: , Rfl:   •  omeprazole (priLOSEC) 40 MG  "capsule, Take 1 capsule by mouth Daily., Disp: 30 capsule, Rfl: 3  •  ondansetron (Zofran) 4 MG tablet, Take 1 tablet by mouth Every 8 (Eight) Hours As Needed for Nausea or Vomiting., Disp: 15 tablet, Rfl: 0    ALLERGIES  No Known Allergies    FAMILY HISTORY:  Family History   Problem Relation Age of Onset   • Diabetes Mother    • No Known Problems Father    • Colon cancer Neg Hx    • Esophageal cancer Neg Hx    • Inflammatory bowel disease Neg Hx    • Irritable bowel syndrome Neg Hx    • Ulcerative colitis Neg Hx        SOCIAL HISTORY  Social History     Socioeconomic History   • Marital status: Single   Tobacco Use   • Smoking status: Former Smoker   • Smokeless tobacco: Never Used   Vaping Use   • Vaping Use: Never used   Substance and Sexual Activity   • Alcohol use: Yes     Comment: SOCIALLY   • Drug use: No   • Sexual activity: Yes     Partners: Female       REVIEW OF SYSTEMS  Review of Systems   Constitutional: Negative for activity change, appetite change, chills, diaphoresis, fatigue, fever, unexpected weight gain and unexpected weight loss.   HENT: Negative for trouble swallowing and voice change.    Gastrointestinal: Negative for abdominal distention, abdominal pain, anal bleeding, blood in stool, constipation, diarrhea, nausea, rectal pain, vomiting, GERD and indigestion.         PHYSICAL EXAM   /90 (BP Location: Right arm, Patient Position: Sitting, Cuff Size: Adult)   Pulse 84   Temp 97.3 °F (36.3 °C) (Temporal)   Ht 180.3 cm (71\")   Wt 71.8 kg (158 lb 6.4 oz)   BMI 22.09 kg/m²   Physical Exam  Vitals and nursing note reviewed.   Constitutional:       Appearance: Normal appearance. He is well-developed.   HENT:      Head: Normocephalic and atraumatic.      Nose: Nose normal.      Mouth/Throat:      Mouth: Mucous membranes are moist.      Pharynx: Oropharynx is clear.   Eyes:      Pupils: Pupils are equal, round, and reactive to light.   Cardiovascular:      Rate and Rhythm: Normal rate and " regular rhythm.   Pulmonary:      Effort: Pulmonary effort is normal.      Breath sounds: Normal breath sounds. No wheezing or rales.   Abdominal:      General: Bowel sounds are normal.      Palpations: Abdomen is soft. There is no mass.      Tenderness: There is no abdominal tenderness. There is no guarding or rebound.      Hernia: No hernia is present.   Musculoskeletal:         General: Normal range of motion.      Cervical back: Normal range of motion and neck supple.   Skin:     General: Skin is warm and dry.   Neurological:      Mental Status: He is alert and oriented to person, place, and time.      Cranial Nerves: No cranial nerve deficit.   Psychiatric:         Behavior: Behavior normal.         Judgment: Judgment normal.         ASSESSMENT / PLAN  1.  Coffee-ground emesis  Now resolved, no further GI complaints  No indication for further work-up or evaluation    Return if symptoms worsen or fail to improve.    I discussed the patients findings and my recommendations with patient    IKE Lynch

## 2022-03-06 ENCOUNTER — HOSPITAL ENCOUNTER (EMERGENCY)
Facility: HOSPITAL | Age: 27
Discharge: HOME | End: 2022-03-06
Attending: EMERGENCY MEDICINE | Admitting: EMERGENCY MEDICINE

## 2022-03-06 VITALS
BODY MASS INDEX: 22.4 KG/M2 | WEIGHT: 160 LBS | TEMPERATURE: 98.5 F | DIASTOLIC BLOOD PRESSURE: 99 MMHG | HEART RATE: 84 BPM | RESPIRATION RATE: 17 BRPM | HEIGHT: 71 IN | SYSTOLIC BLOOD PRESSURE: 147 MMHG | OXYGEN SATURATION: 90 %

## 2022-03-06 DIAGNOSIS — E87.6 HYPOKALEMIA: ICD-10-CM

## 2022-03-06 DIAGNOSIS — E11.65 HYPERGLYCEMIA DUE TO DIABETES MELLITUS: ICD-10-CM

## 2022-03-06 DIAGNOSIS — R11.2 NON-INTRACTABLE VOMITING WITH NAUSEA, UNSPECIFIED VOMITING TYPE: Primary | ICD-10-CM

## 2022-03-06 DIAGNOSIS — E86.0 DEHYDRATION: ICD-10-CM

## 2022-03-06 LAB
ALBUMIN SERPL-MCNC: 5.5 G/DL (ref 3.5–5.2)
ALBUMIN/GLOB SERPL: 1.8 G/DL
ALP SERPL-CCNC: 83 U/L (ref 39–117)
ALT SERPL W P-5'-P-CCNC: 30 U/L (ref 1–41)
AMPHET+METHAMPHET UR QL: NEGATIVE
AMPHETAMINES UR QL: NEGATIVE
ANION GAP SERPL CALCULATED.3IONS-SCNC: 17 MMOL/L (ref 5–15)
AST SERPL-CCNC: 11 U/L (ref 1–40)
B-OH-BUTYR SERPL-SCNC: 1.19 MMOL/L (ref 0.02–0.27)
BACTERIA UR QL AUTO: NORMAL /HPF
BARBITURATES UR QL SCN: NEGATIVE
BASOPHILS # BLD AUTO: 0.02 10*3/MM3 (ref 0–0.2)
BASOPHILS NFR BLD AUTO: 0.1 % (ref 0–1.5)
BENZODIAZ UR QL SCN: NEGATIVE
BILIRUB SERPL-MCNC: 1.1 MG/DL (ref 0–1.2)
BILIRUB UR QL STRIP: NEGATIVE
BUN SERPL-MCNC: 26 MG/DL (ref 6–20)
BUN/CREAT SERPL: 29.2 (ref 7–25)
BUPRENORPHINE SERPL-MCNC: NEGATIVE NG/ML
CALCIUM SPEC-SCNC: 10.6 MG/DL (ref 8.6–10.5)
CANNABINOIDS SERPL QL: POSITIVE
CHLORIDE SERPL-SCNC: 91 MMOL/L (ref 98–107)
CLARITY UR: CLEAR
CO2 SERPL-SCNC: 30 MMOL/L (ref 22–29)
COCAINE UR QL: NEGATIVE
COLOR UR: YELLOW
CREAT SERPL-MCNC: 0.89 MG/DL (ref 0.76–1.27)
DEPRECATED RDW RBC AUTO: 35.9 FL (ref 37–54)
EGFRCR SERPLBLD CKD-EPI 2021: 121.2 ML/MIN/1.73
EOSINOPHIL # BLD AUTO: 0.01 10*3/MM3 (ref 0–0.4)
EOSINOPHIL NFR BLD AUTO: 0.1 % (ref 0.3–6.2)
ERYTHROCYTE [DISTWIDTH] IN BLOOD BY AUTOMATED COUNT: 12.6 % (ref 12.3–15.4)
ETHANOL BLD-MCNC: <10 MG/DL (ref 0–10)
FLUAV RNA RESP QL NAA+PROBE: NOT DETECTED
FLUBV RNA RESP QL NAA+PROBE: NOT DETECTED
GLOBULIN UR ELPH-MCNC: 3.1 GM/DL
GLUCOSE BLDC GLUCOMTR-MCNC: 157 MG/DL (ref 70–130)
GLUCOSE SERPL-MCNC: 304 MG/DL (ref 65–99)
GLUCOSE UR STRIP-MCNC: ABNORMAL MG/DL
HCT VFR BLD AUTO: 44.5 % (ref 37.5–51)
HGB BLD-MCNC: 16.4 G/DL (ref 13–17.7)
HGB UR QL STRIP.AUTO: NEGATIVE
HOLD SPECIMEN: NORMAL
HYALINE CASTS UR QL AUTO: NORMAL /LPF
IMM GRANULOCYTES # BLD AUTO: 0.1 10*3/MM3 (ref 0–0.05)
IMM GRANULOCYTES NFR BLD AUTO: 0.6 % (ref 0–0.5)
KETONES UR QL STRIP: ABNORMAL
LEUKOCYTE ESTERASE UR QL STRIP.AUTO: NEGATIVE
LIPASE SERPL-CCNC: 16 U/L (ref 13–60)
LYMPHOCYTES # BLD AUTO: 0.71 10*3/MM3 (ref 0.7–3.1)
LYMPHOCYTES NFR BLD AUTO: 4.2 % (ref 19.6–45.3)
MAGNESIUM SERPL-MCNC: 2 MG/DL (ref 1.6–2.6)
MCH RBC QN AUTO: 29.5 PG (ref 26.6–33)
MCHC RBC AUTO-ENTMCNC: 36.9 G/DL (ref 31.5–35.7)
MCV RBC AUTO: 80.2 FL (ref 79–97)
METHADONE UR QL SCN: NEGATIVE
MONOCYTES # BLD AUTO: 1.16 10*3/MM3 (ref 0.1–0.9)
MONOCYTES NFR BLD AUTO: 6.8 % (ref 5–12)
NEUTROPHILS NFR BLD AUTO: 14.97 10*3/MM3 (ref 1.7–7)
NEUTROPHILS NFR BLD AUTO: 88.2 % (ref 42.7–76)
NITRITE UR QL STRIP: NEGATIVE
NRBC BLD AUTO-RTO: 0 /100 WBC (ref 0–0.2)
OPIATES UR QL: NEGATIVE
OXYCODONE UR QL SCN: NEGATIVE
PCP UR QL SCN: NEGATIVE
PH UR STRIP.AUTO: 6.5 [PH] (ref 5–8)
PLATELET # BLD AUTO: 284 10*3/MM3 (ref 140–450)
PMV BLD AUTO: 10.1 FL (ref 6–12)
POTASSIUM SERPL-SCNC: 3 MMOL/L (ref 3.5–5.2)
PROPOXYPH UR QL: NEGATIVE
PROT SERPL-MCNC: 8.6 G/DL (ref 6–8.5)
PROT UR QL STRIP: ABNORMAL
RBC # BLD AUTO: 5.55 10*6/MM3 (ref 4.14–5.8)
RBC # UR STRIP: NORMAL /HPF
REF LAB TEST METHOD: NORMAL
SARS-COV-2 RNA RESP QL NAA+PROBE: NOT DETECTED
SODIUM SERPL-SCNC: 138 MMOL/L (ref 136–145)
SP GR UR STRIP: 1.03 (ref 1–1.03)
SQUAMOUS #/AREA URNS HPF: NORMAL /HPF
TRICYCLICS UR QL SCN: NEGATIVE
UROBILINOGEN UR QL STRIP: ABNORMAL
WBC # UR STRIP: NORMAL /HPF
WBC NRBC COR # BLD: 16.97 10*3/MM3 (ref 3.4–10.8)
WHOLE BLOOD HOLD SPECIMEN: NORMAL
WHOLE BLOOD HOLD SPECIMEN: NORMAL

## 2022-03-06 PROCEDURE — 81001 URINALYSIS AUTO W/SCOPE: CPT | Performed by: PHYSICIAN ASSISTANT

## 2022-03-06 PROCEDURE — 96375 TX/PRO/DX INJ NEW DRUG ADDON: CPT

## 2022-03-06 PROCEDURE — 63710000001 INSULIN REGULAR HUMAN PER 5 UNITS: Performed by: PHYSICIAN ASSISTANT

## 2022-03-06 PROCEDURE — 82962 GLUCOSE BLOOD TEST: CPT

## 2022-03-06 PROCEDURE — 83690 ASSAY OF LIPASE: CPT | Performed by: PHYSICIAN ASSISTANT

## 2022-03-06 PROCEDURE — 87636 SARSCOV2 & INF A&B AMP PRB: CPT | Performed by: PHYSICIAN ASSISTANT

## 2022-03-06 PROCEDURE — 25010000002 ONDANSETRON PER 1 MG: Performed by: PHYSICIAN ASSISTANT

## 2022-03-06 PROCEDURE — 80053 COMPREHEN METABOLIC PANEL: CPT | Performed by: PHYSICIAN ASSISTANT

## 2022-03-06 PROCEDURE — 82077 ASSAY SPEC XCP UR&BREATH IA: CPT | Performed by: PHYSICIAN ASSISTANT

## 2022-03-06 PROCEDURE — 83735 ASSAY OF MAGNESIUM: CPT | Performed by: PHYSICIAN ASSISTANT

## 2022-03-06 PROCEDURE — 96374 THER/PROPH/DIAG INJ IV PUSH: CPT

## 2022-03-06 PROCEDURE — 80306 DRUG TEST PRSMV INSTRMNT: CPT | Performed by: PHYSICIAN ASSISTANT

## 2022-03-06 PROCEDURE — 82010 KETONE BODYS QUAN: CPT | Performed by: PHYSICIAN ASSISTANT

## 2022-03-06 PROCEDURE — 85025 COMPLETE CBC W/AUTO DIFF WBC: CPT | Performed by: PHYSICIAN ASSISTANT

## 2022-03-06 PROCEDURE — 99284 EMERGENCY DEPT VISIT MOD MDM: CPT

## 2022-03-06 RX ORDER — SODIUM CHLORIDE 0.9 % (FLUSH) 0.9 %
10 SYRINGE (ML) INJECTION AS NEEDED
Status: DISCONTINUED | OUTPATIENT
Start: 2022-03-06 | End: 2022-03-06 | Stop reason: HOSPADM

## 2022-03-06 RX ORDER — ONDANSETRON 4 MG/1
4 TABLET, FILM COATED ORAL EVERY 8 HOURS PRN
Qty: 15 TABLET | Refills: 0 | Status: SHIPPED | OUTPATIENT
Start: 2022-03-06 | End: 2022-03-31

## 2022-03-06 RX ORDER — POTASSIUM CHLORIDE 750 MG/1
10 TABLET, FILM COATED, EXTENDED RELEASE ORAL DAILY
Qty: 5 TABLET | Refills: 0 | Status: SHIPPED | OUTPATIENT
Start: 2022-03-06 | End: 2022-03-11

## 2022-03-06 RX ORDER — POTASSIUM CHLORIDE 750 MG/1
40 CAPSULE, EXTENDED RELEASE ORAL ONCE
Status: COMPLETED | OUTPATIENT
Start: 2022-03-06 | End: 2022-03-06

## 2022-03-06 RX ORDER — ONDANSETRON 2 MG/ML
4 INJECTION INTRAMUSCULAR; INTRAVENOUS ONCE
Status: COMPLETED | OUTPATIENT
Start: 2022-03-06 | End: 2022-03-06

## 2022-03-06 RX ORDER — FAMOTIDINE 10 MG/ML
20 INJECTION, SOLUTION INTRAVENOUS ONCE
Status: COMPLETED | OUTPATIENT
Start: 2022-03-06 | End: 2022-03-06

## 2022-03-06 RX ADMIN — INSULIN HUMAN 5 UNITS: 100 INJECTION, SOLUTION PARENTERAL at 11:08

## 2022-03-06 RX ADMIN — SODIUM CHLORIDE 1000 ML: 9 INJECTION, SOLUTION INTRAVENOUS at 09:34

## 2022-03-06 RX ADMIN — SODIUM CHLORIDE 1000 ML: 9 INJECTION, SOLUTION INTRAVENOUS at 10:30

## 2022-03-06 RX ADMIN — ONDANSETRON 4 MG: 2 INJECTION INTRAMUSCULAR; INTRAVENOUS at 09:34

## 2022-03-06 RX ADMIN — FAMOTIDINE 20 MG: 10 INJECTION INTRAVENOUS at 09:34

## 2022-03-06 RX ADMIN — SODIUM CHLORIDE 1000 ML: 9 INJECTION, SOLUTION INTRAVENOUS at 11:09

## 2022-03-06 RX ADMIN — POTASSIUM CHLORIDE 40 MEQ: 750 CAPSULE, EXTENDED RELEASE ORAL at 10:30

## 2022-03-06 NOTE — EXTERNAL PATIENT INSTRUCTIONS
Patient Education   Table of Contents       Diabetes Mellitus and Nutrition, Adult       Hyperglycemia       Nausea and Vomiting, Adult     To view videos and all your education online visit,   [email or web address]   or scan this QR code with your smartphone.                  Diabetes Mellitus and Nutrition, Adult     When you have diabetes, or diabetes mellitus, it is very important to have healthy eating habits because your blood sugar (glucose) levels are greatly affected by what you eat and drink. Eating healthy foods in the right amounts, at about the same times every day, can help you:       Control your blood glucose.       Lower your risk of heart disease.       Improve your blood pressure.       Reach or maintain a healthy weight.     What can affect my meal plan?    Every person with diabetes is different, and each person has different needs for a meal plan. Your health care provider may recommend that you work with a dietitian to make a meal plan that is best for you. Your meal plan may vary depending on factors such as:       The calories you need.       The medicines you take.       Your weight.       Your blood glucose, blood pressure, and cholesterol levels.       Your activity level.       Other health conditions you have, such as heart or kidney disease.     How do carbohydrates affect me?   Carbohydrates, also called carbs, affect your blood glucose level more than any other type of food. Eating carbs naturally raises the amount of glucose in your blood. Carb counting is a method for keeping track of how many carbs you eat. Counting carbs is important to keep your blood glucose at a healthy level, especially if you use insulin or take certain oral diabetes medicines.   It is important to know how many carbs you can safely have in each meal. This is different for every person. Your dietitian can help you calculate how many carbs you should have at each meal and for each snack.   How does  "alcohol affect me?    Alcohol can cause a sudden decrease in blood glucose (hypoglycemia), especially if you use insulin or take certain oral diabetes medicines. Hypoglycemia can be a life-threatening condition. Symptoms of hypoglycemia, such as sleepiness, dizziness, and confusion, are similar to symptoms of having too much alcohol.      Do not  drink alcohol if:       Your health care provider tells you not to drink.       You are pregnant, may be pregnant, or are planning to become pregnant.      If you drink alcohol:      Do not  drink on an empty stomach.      Limit how much you use to:       0?1 drink a day for women.       0?2 drinks a day for men.       Be aware of how much alcohol is in your drink. In the [place] one drink equals one 12 oz bottle of beer (355 mL), one 5 oz glass of wine (148 mL), or one 1? oz glass of hard liquor (44 mL).      Keep yourself hydrated with water, diet soda, or unsweetened iced tea.       Keep in mind that regular soda, juice, and other mixers may contain a lot of sugar and must be counted as carbs.         What are tips for following this plan?      Reading food labels         Start by checking the serving size on the \"Nutrition Facts\" label of packaged foods and drinks. The amount of calories, carbs, fats, and other nutrients listed on the label is based on one serving of the item. Many items contain more than one serving per package.       Check the total grams (g) of carbs in one serving. You can calculate the number of servings of carbs in one serving by dividing the total carbs by 15. For example, if a food has 30 g of total carbs per serving, it would be equal to 2 servings of carbs.       Check the number of grams (g) of saturated fats and trans fats in one serving. Choose foods that have a low amount or none of these fats.       Check the number of milligrams (mg) of salt (sodium) in one serving. Most people should limit total sodium intake to less than 2,300 mg per " "day.       Always check the nutrition information of foods labeled as \"low-fat\" or \"nonfat.\" These foods may be higher in added sugar or refined carbs and should be avoided.       Talk to your dietitian to identify your daily goals for nutrients listed on the label.     Shopping         Avoid buying canned, pre-made, or processed foods. These foods tend to be high in fat, sodium, and added sugar.       Shop around the outside edge of the grocery store. This is where you will most often find fresh fruits and vegetables, bulk grains, fresh meats, and fresh dairy.     Cooking         Use low-heat cooking methods, such as baking, instead of high-heat cooking methods like deep frying.       Cook using healthy oils, such as olive, canola, or sunflower oil.       Avoid cooking with butter, cream, or high-fat meats.     Meal planning         Eat meals and snacks regularly, preferably at the same times every day. Avoid going long periods of time without eating.       Eat foods that are high in fiber, such as fresh fruits, vegetables, beans, and whole grains. Talk with your dietitian about how many servings of carbs you can eat at each meal.      Eat 4?6 oz (112?168 g) of lean protein each day, such as lean meat, chicken, fish, eggs, or tofu. One ounce (oz) of lean protein is equal to:       1 oz (28 g) of meat, chicken, or fish.       1 egg.       ? cup (62 g) of tofu.       Eat some foods each day that contain healthy fats, such as avocado, nuts, seeds, and fish.       What foods should I eat?   Fruits   Berries. Apples. Oranges. Peaches. Apricots. Plums. Grapes. Christoph. Papaya. Pomegranate. Kiwi. Cherries.   Vegetables   Lettuce. Spinach. Leafy greens, including kale, chard, pb greens, and mustard greens. Beets. Cauliflower. Cabbage. Broccoli. Carrots. Green beans. Tomatoes. Peppers. Onions. Cucumbers. [place] sprouts.   Grains   Whole grains, such as whole-wheat or whole-grain bread, crackers, tortillas, cereal, and " pasta. Unsweetened oatmeal. Quinoa. Brown or wild rice.   Meats and other proteins   Seafood. Poultry without skin. Lean cuts of poultry and beef. Tofu. Nuts. Seeds.   Dairy   Low-fat or fat-free dairy products such as milk, yogurt, and cheese.   The items listed above may not be a complete list of foods and beverages you can eat. Contact a dietitian for more information.   What foods should I avoid?   Fruits   Fruits canned with syrup.   Vegetables   Canned vegetables. Frozen vegetables with butter or cream sauce.   Grains   Refined white flour and flour products such as bread, pasta, snack foods, and cereals. Avoid all processed foods.   Meats and other proteins   Fatty cuts of meat. Poultry with skin. Breaded or fried meats. Processed meat. Avoid saturated fats.   Dairy   Full-fat yogurt, cheese, or milk.   Beverages   Sweetened drinks, such as soda or iced tea.   The items listed above may not be a complete list of foods and beverages you should avoid. Contact a dietitian for more information.   Questions to ask a health care provider         Do I need to meet with a diabetes educator?       Do I need to meet with a dietitian?       What number can I call if I have questions?       When are the best times to check my blood glucose?     Where to find more information:         American Diabetes Association: diabetes.org         Academy of Nutrition and Dietetics: www.eatright.org         National [place] of Diabetes and Digestive and Kidney Diseases: www.niddk.nih.gov         Association of Diabetes Care and Education Specialists: www.diabeteseducator.org       Summary         It is important to have healthy eating habits because your blood sugar (glucose) levels are greatly affected by what you eat and drink.       A healthy meal plan will help you control your blood glucose and maintain a healthy lifestyle.       Your health care provider may recommend that you work with a dietitian to make a meal plan that is  best for you.       Keep in mind that carbohydrates (carbs) and alcohol have immediate effects on your blood glucose levels. It is important to count carbs and to use alcohol carefully.     This information is not intended to replace advice given to you by your health care provider. Make sure you discuss any questions you have with your health care provider.     Document Released: 09/14/2006Document Revised: 11/24/2020Document Reviewed: 11/24/2020     Elsevier Patient Education ? 2021 [employer]         Hyperglycemia     Hyperglycemia is when the sugar (glucose) level in your blood is too high. High blood sugar can happen to people who have or do not have diabetes. High blood sugar can happen quickly. It can be an emergency.   What are the causes?    If you have diabetes, high blood sugar may be caused by:       Medicines that increase blood sugar or affect your control of diabetes.       Getting less physical activity.       Overeating.       Being sick or injured or having an infection.       Having surgery.       Stress.       Not giving yourself enough insulin (if you are taking it).     You may have high blood sugar because you have diabetes that has not been diagnosed yet.    If you do not have diabetes, high blood sugar may be caused by:       Certain medicines.       Stress.       A bad illness.       An infection.       Having surgery.       Diseases of the pancreas.     What increases the risk?    This condition is more likely to develop in people who have risk factors for diabetes, such as:       Having a family member with diabetes.       Certain conditions in which the body's defense system (immune system) attacks itself. These are called autoimmune disorders.       Being overweight.       Not being active.       Having a condition called insulin resistance.      Having a history of:       Prediabetes.       Diabetes when pregnant.       Polycystic ovarian syndrome (PCOS).     What are the signs or  symptoms?    This condition may not cause symptoms. If you do have symptoms, they may include:       Feeling more thirsty than normal.       Needing to pee (urinate) more often than normal.       Hunger.       Feeling very tired.       Blurry eyesight (vision).      You may get other symptoms as the condition gets worse, such as:       Dry mouth.       Pain in your belly (abdomen).       Not being hungry (loss of appetite).       Breath that smells fruity.       Weakness.       Weight loss that is not planned.       A tingling or numb feeling in your hands or feet.       A headache.       Cuts or bruises that heal slowly.     How is this treated?    Treatment depends on the cause of your condition. Treatment may include:       Taking medicine to control your blood sugar levels.       Changing your medicine or dosage if you take insulin or other diabetes medicines.      Lifestyle changes. These may include:       Exercising more.       Eating healthier foods.       Losing weight.       Treating an illness or infection.       Checking your blood sugar more often.       Stopping or reducing steroid medicines.     If your condition gets very bad, you will need to be treated in the hospital.   Follow these instructions at home:   General instructions         Take over-the-counter and prescription medicines only as told by your doctor.      Do not  smoke or use any products that contain nicotine or tobacco. If you need help quitting, ask your doctor.      If you drink alcohol:      Limit how much you have to:       0?1 drink a day for women who are not pregnant.       0?2 drinks a day for men.           Know how much alcohol is in a drink. In the [place] one drink equals one 12 oz bottle of beer (355 mL), one 5 oz glass of wine (148 mL), or one 1? oz glass of hard liquor (44 mL).         Manage stress. If you need help with this, ask your doctor.       Do exercises as told by your doctor.       Keep all follow-up visits.  "  Eating and drinking            Stay at a healthy weight.      Make sure you drink enough fluid when you:       Exercise.       Get sick.       Are in hot temperatures.       Drink enough fluid to keep your pee (urine) pale yellow.     If you have diabetes:            Know the symptoms of high blood sugar.      Follow your diabetes management plan as told by your doctor. Make sure you:       Take insulin and medicines as told.       Follow your exercise plan.       Follow your meal plan. Eat on time. Do not  skip meals.       Check your blood sugar as often as told. Make sure you check before and after exercise. If you exercise longer or in a different way, check your blood sugar more often.       Follow your sick day plan whenever you cannot eat or drink normally. Make this plan ahead of time with your doctor.       Share your diabetes management plan with people in your workplace, school, and household.       Check your pee for ketones when you are ill and as told by your doctor.       Carry a card or wear jewelry that says that you have diabetes.         Where to find more information   American Diabetes Association:   www.diabetes.org     Contact a doctor if:         Your blood sugar level is at or above 240 mg/dL (13.3 mmol/L) for 2 days in a row.       You have problems keeping your blood sugar in your target range.       You have high blood pressure often.      You have signs of illness, such as:       Feeling like you may vomit (feeling nauseous).       Vomiting.       A fever.     Get help right away if:         Your blood sugar monitor reads \"high\" even when you are taking insulin.       You have trouble breathing.       You have a change in how you think, feel, or act (mental status).       You feel like you may vomit, and the feeling does not go away.       You cannot stop vomiting.     These symptoms may be an emergency. Get medical help right away. Call your local emergency services (911 in the [place] "      Do not wait to see if the symptoms will go away.      Do not drive yourself to the hospital.     Summary         Hyperglycemia is when the sugar (glucose) level in your blood is too high.       High blood sugar can happen to people who have or do not have diabetes.       Make sure you drink enough fluids and follow your meal plan. Exercise as often as told by your doctor.       Contact your doctor if you have problems keeping your blood sugar in your target range.     This information is not intended to replace advice given to you by your health care provider. Make sure you discuss any questions you have with your health care provider.     Document Released: 10/15/2010Document Revised: 10/01/2021Document Reviewed: 10/01/2021     [employer] Patient Education ? 2021 [employer]         Nausea and Vomiting, Adult     Nausea is feeling sick to your stomach or feeling that you are about to throw up (vomit). Vomiting is when food in your stomach is thrown up and out of the mouth. Throwing up can make you feel weak. It can also make you lose too much water in your body (get dehydrated). If you lose too much water in your body, you may:       Feel tired.       Feel thirsty.       Have a dry mouth.       Have cracked lips.       Go pee (urinate) less often.     Older adults and people with other diseases or a weak body defense system (immune system) are at higher risk for losing too much water in the body. If you feel sick to your stomach and you throw up, it is important to follow instructions from your doctor about how to take care of yourself.   Follow these instructions at home:   Watch your symptoms for any changes. Tell your doctor about them. Follow these instructions to care for yourself at home.   Eating and drinking               Take an ORS (oral rehydration solution). This is a drink that is sold at pharmacies and stores.      Drink clear fluids in small amounts as you are able, such as:       Water.        Ice chips.       Fruit juice that has water added (diluted fruit juice).       Low-calorie sports drinks.      Eat bland, easy-to-digest foods in small amounts as you are able, such as:       Bananas.       Applesauce.       Rice.       Low-fat (lean) meats.       Toast.       Crackers.       Avoid drinking fluids that have a lot of sugar or caffeine in them. This includes energy drinks, sports drinks, and soda.       Avoid alcohol.       Avoid spicy or fatty foods.       General instructions         Take over-the-counter and prescription medicines only as told by your doctor.       Drink enough fluid to keep your pee (urine) pale yellow.       Wash your hands often with soap and water. If you cannot use soap and water, use hand .       Make sure that all people in your home wash their hands well and often.       Rest at home while you get better.       Watch your condition for any changes.       Take slow and deep breaths when you feel sick to your stomach.       Keep all follow-up visits as told by your doctor. This is important.       Contact a doctor if:         Your symptoms get worse.       You have new symptoms.       You have a fever.       You cannot drink fluids without throwing up.       You feel sick to your stomach for more than 2 days.       You feel light-headed or dizzy.       You have a headache.       You have muscle cramps.       You have a rash.       You have pain while peeing.     Get help right away if:         You have pain in your chest, neck, arm, or jaw.       You feel very weak or you pass out (faint).       You throw up again and again.       You have throw up that is bright red or looks like black coffee grounds.       You have bloody or black poop (stools) or poop that looks like tar.       You have a very bad headache, a stiff neck, or both.       You have very bad pain, cramping, or bloating in your belly (abdomen).       You have trouble breathing.       You are breathing  very quickly.       Your heart is beating very quickly.       Your skin feels cold and clammy.       You feel confused.      You have signs of losing too much water in your body, such as:       Dark pee, very little pee, or no pee.       Cracked lips.       Dry mouth.       Sunken eyes.       Sleepiness.       Weakness.     These symptoms may be an emergency. Do not wait to see if the symptoms will go away. Get medical help right away. Call your local emergency services (911 in the [place] Do not drive yourself to the hospital.   Summary         Nausea is feeling sick to your stomach or feeling that you are about to throw up (vomit). Vomiting is when food in your stomach is thrown up and out of the mouth.       Follow instructions from your doctor about eating and drinking to keep from losing too much water in your body.       Take over-the-counter and prescription medicines only as told by your doctor.       Contact your doctor if your symptoms get worse or you have new symptoms.       Keep all follow-up visits as told by your doctor. This is important.     This information is not intended to replace advice given to you by your health care provider. Make sure you discuss any questions you have with your health care provider.     Document Released: 06/05/2009Document Revised: 04/10/2020Document Reviewed: 05/28/2019     [employer] Patient Education ? 2021 [employer]

## 2022-03-06 NOTE — ED PROVIDER NOTES
Subjective   Pt is a 26 yo male presenting to ED with complaints of vomiting for 2 days. PMHx significant for DM (insulin), Non compliance and GI bleed. Pt reports drinking several beers 2 days ago on an empty stomach and has had N/V since. He denies abdominal pain, diarrhea or urinary sx. He denies dizziness, fever, chills, cough, CP or SOB. He reports he is a diabetic and is supposed to be on insulin but hasn't taken any meds for about a year and does not check his blood sugar. He denies tobacco or drug use.       History provided by:  Patient and medical records      Review of Systems   Constitutional: Negative for chills and fever.   HENT: Negative for congestion, sore throat and trouble swallowing.    Eyes: Negative for visual disturbance.   Respiratory: Negative for cough and shortness of breath.    Cardiovascular: Negative for chest pain and leg swelling.   Gastrointestinal: Positive for nausea and vomiting. Negative for abdominal pain, constipation and diarrhea.   Genitourinary: Negative for difficulty urinating, dysuria and flank pain.   Musculoskeletal: Negative for arthralgias and back pain.   Skin: Negative.  Negative for rash and wound.   Allergic/Immunologic: Negative.    Neurological: Negative for dizziness, syncope, weakness, numbness and headaches.   Psychiatric/Behavioral: Negative for confusion.       Past Medical History:   Diagnosis Date   • Diabetes mellitus (HCC)     PT DENIES THIS DX, PREVIOUS DOCUMENTED   • GI (gastrointestinal bleed)        No Known Allergies    History reviewed. No pertinent surgical history.    Family History   Problem Relation Age of Onset   • Diabetes Mother    • No Known Problems Father    • Colon cancer Neg Hx    • Esophageal cancer Neg Hx    • Inflammatory bowel disease Neg Hx    • Irritable bowel syndrome Neg Hx    • Ulcerative colitis Neg Hx        Social History     Socioeconomic History   • Marital status: Single   Tobacco Use   • Smoking status: Former Smoker    • Smokeless tobacco: Never Used   Vaping Use   • Vaping Use: Never used   Substance and Sexual Activity   • Alcohol use: Yes     Comment: SOCIALLY   • Drug use: No   • Sexual activity: Yes     Partners: Female           Objective   Physical Exam  Vitals and nursing note reviewed.   Constitutional:       Appearance: He is well-developed.   HENT:      Head: Atraumatic.      Nose: Nose normal.   Eyes:      General: Lids are normal.      Conjunctiva/sclera: Conjunctivae normal.      Pupils: Pupils are equal, round, and reactive to light.   Cardiovascular:      Rate and Rhythm: Regular rhythm. Tachycardia present.      Heart sounds: Normal heart sounds.   Pulmonary:      Effort: Pulmonary effort is normal.      Breath sounds: Normal breath sounds.   Abdominal:      General: There is no distension.      Palpations: Abdomen is soft.      Tenderness: There is no abdominal tenderness. There is no guarding or rebound.   Musculoskeletal:         General: No tenderness or deformity. Normal range of motion.      Cervical back: Normal range of motion and neck supple.   Skin:     General: Skin is warm and dry.   Neurological:      Mental Status: He is alert and oriented to person, place, and time.      Sensory: No sensory deficit.   Psychiatric:         Behavior: Behavior normal.         Procedures           ED Course  ED Course as of 03/06/22 1432   Sun Mar 06, 2022   0951 WBC(!): 16.97 [RT]   1023 Glucose(!): 304 [RT]   1023 Potassium(!): 3.0 [RT]   1023 Beta-Hydroxybutyrate Quant(!): 1.188 [RT]   1023 Anion Gap(!): 17.0 [RT]   1231 Glucose(!): 157 [RT]   1325 THC Screen, Urine(!): Positive [RT]      ED Course User Index  [RT] Bertha Elliott PA      Re-examined patient several times in ED. Pt resting and feeling better. Patient given 3 L NS and Zofran as well as potassium replacement. Repeat glucose 157. Discussed importance of compliance with medications and close f/u with PCP and Endocrinology. Counseled on seriousness of  uncontrolled diabetes.     Discussed patient with Dr. Cunningham who is agreeable with ED course and tx plan. Will dc home on Metformin at this time.     Reviewed old records.     Recent Results (from the past 24 hour(s))   Comprehensive Metabolic Panel    Collection Time: 03/06/22  9:33 AM    Specimen: Blood   Result Value Ref Range    Glucose 304 (H) 65 - 99 mg/dL    BUN 26 (H) 6 - 20 mg/dL    Creatinine 0.89 0.76 - 1.27 mg/dL    Sodium 138 136 - 145 mmol/L    Potassium 3.0 (L) 3.5 - 5.2 mmol/L    Chloride 91 (L) 98 - 107 mmol/L    CO2 30.0 (H) 22.0 - 29.0 mmol/L    Calcium 10.6 (H) 8.6 - 10.5 mg/dL    Total Protein 8.6 (H) 6.0 - 8.5 g/dL    Albumin 5.50 (H) 3.50 - 5.20 g/dL    ALT (SGPT) 30 1 - 41 U/L    AST (SGOT) 11 1 - 40 U/L    Alkaline Phosphatase 83 39 - 117 U/L    Total Bilirubin 1.1 0.0 - 1.2 mg/dL    Globulin 3.1 gm/dL    A/G Ratio 1.8 g/dL    BUN/Creatinine Ratio 29.2 (H) 7.0 - 25.0    Anion Gap 17.0 (H) 5.0 - 15.0 mmol/L    eGFR 121.2 >60.0 mL/min/1.73   Lipase    Collection Time: 03/06/22  9:33 AM    Specimen: Blood   Result Value Ref Range    Lipase 16 13 - 60 U/L   Ethanol    Collection Time: 03/06/22  9:33 AM    Specimen: Blood   Result Value Ref Range    Ethanol <10 0 - 10 mg/dL   Magnesium    Collection Time: 03/06/22  9:33 AM    Specimen: Blood   Result Value Ref Range    Magnesium 2.0 1.6 - 2.6 mg/dL   CBC Auto Differential    Collection Time: 03/06/22  9:33 AM    Specimen: Blood   Result Value Ref Range    WBC 16.97 (H) 3.40 - 10.80 10*3/mm3    RBC 5.55 4.14 - 5.80 10*6/mm3    Hemoglobin 16.4 13.0 - 17.7 g/dL    Hematocrit 44.5 37.5 - 51.0 %    MCV 80.2 79.0 - 97.0 fL    MCH 29.5 26.6 - 33.0 pg    MCHC 36.9 (H) 31.5 - 35.7 g/dL    RDW 12.6 12.3 - 15.4 %    RDW-SD 35.9 (L) 37.0 - 54.0 fl    MPV 10.1 6.0 - 12.0 fL    Platelets 284 140 - 450 10*3/mm3    Neutrophil % 88.2 (H) 42.7 - 76.0 %    Lymphocyte % 4.2 (L) 19.6 - 45.3 %    Monocyte % 6.8 5.0 - 12.0 %    Eosinophil % 0.1 (L) 0.3 - 6.2 %     Basophil % 0.1 0.0 - 1.5 %    Immature Grans % 0.6 (H) 0.0 - 0.5 %    Neutrophils, Absolute 14.97 (H) 1.70 - 7.00 10*3/mm3    Lymphocytes, Absolute 0.71 0.70 - 3.10 10*3/mm3    Monocytes, Absolute 1.16 (H) 0.10 - 0.90 10*3/mm3    Eosinophils, Absolute 0.01 0.00 - 0.40 10*3/mm3    Basophils, Absolute 0.02 0.00 - 0.20 10*3/mm3    Immature Grans, Absolute 0.10 (H) 0.00 - 0.05 10*3/mm3    nRBC 0.0 0.0 - 0.2 /100 WBC   Green Top (Gel)    Collection Time: 03/06/22  9:33 AM   Result Value Ref Range    Extra Tube Hold for add-ons.    Lavender Top    Collection Time: 03/06/22  9:33 AM   Result Value Ref Range    Extra Tube hold for add-on    Gold Top - SST    Collection Time: 03/06/22  9:33 AM   Result Value Ref Range    Extra Tube Hold for add-ons.    Gray Top    Collection Time: 03/06/22  9:33 AM   Result Value Ref Range    Extra Tube Hold for add-ons.    Light Blue Top    Collection Time: 03/06/22  9:33 AM   Result Value Ref Range    Extra Tube hold for add-on    Beta Hydroxybutyrate Quantitative    Collection Time: 03/06/22  9:33 AM    Specimen: Blood   Result Value Ref Range    Beta-Hydroxybutyrate Quant 1.188 (H) 0.020 - 0.270 mmol/L   COVID-19 and FLU A/B PCR - Swab, Nasopharynx    Collection Time: 03/06/22  9:34 AM    Specimen: Nasopharynx; Swab   Result Value Ref Range    COVID19 Not Detected Not Detected - Ref. Range    Influenza A PCR Not Detected Not Detected    Influenza B PCR Not Detected Not Detected   POC Glucose Once    Collection Time: 03/06/22 12:27 PM    Specimen: Blood   Result Value Ref Range    Glucose 157 (H) 70 - 130 mg/dL   Urinalysis With Microscopic If Indicated (No Culture) - Urine, Clean Catch    Collection Time: 03/06/22 12:41 PM    Specimen: Urine, Clean Catch   Result Value Ref Range    Color, UA Yellow Yellow, Straw    Appearance, UA Clear Clear    pH, UA 6.5 5.0 - 8.0    Specific Gravity, UA 1.035 (H) 1.001 - 1.030    Glucose, UA >=1000 mg/dL (3+) (A) Negative    Ketones, UA 80 mg/dL (3+)  (A) Negative    Bilirubin, UA Negative Negative    Blood, UA Negative Negative    Protein, UA >=300 mg/dL (3+) (A) Negative    Leuk Esterase, UA Negative Negative    Nitrite, UA Negative Negative    Urobilinogen, UA 1.0 E.U./dL 0.2 - 1.0 E.U./dL   Urine Drug Screen - Urine, Clean Catch    Collection Time: 03/06/22 12:41 PM    Specimen: Urine, Clean Catch   Result Value Ref Range    THC, Screen, Urine Positive (A) Negative    Phencyclidine (PCP), Urine Negative Negative    Cocaine Screen, Urine Negative Negative    Methamphetamine, Ur Negative Negative    Opiate Screen Negative Negative    Amphetamine Screen, Urine Negative Negative    Benzodiazepine Screen, Urine Negative Negative    Tricyclic Antidepressants Screen Negative Negative    Methadone Screen, Urine Negative Negative    Barbiturates Screen, Urine Negative Negative    Oxycodone Screen, Urine Negative Negative    Propoxyphene Screen Negative Negative    Buprenorphine, Screen, Urine Negative Negative   Urinalysis, Microscopic Only - Urine, Clean Catch    Collection Time: 03/06/22 12:41 PM    Specimen: Urine, Clean Catch   Result Value Ref Range    RBC, UA 0-2 None Seen, 0-2 /HPF    WBC, UA 0-2 None Seen, 0-2 /HPF    Bacteria, UA None Seen None Seen, Trace /HPF    Squamous Epithelial Cells, UA None Seen None Seen, 0-2 /HPF    Hyaline Casts, UA 0-6 0 - 6 /LPF    Methodology Automated Microscopy      Note: In addition to lab results from this visit, the labs listed above may include labs taken at another facility or during a different encounter within the last 24 hours. Please correlate lab times with ED admission and discharge times for further clarification of the services performed during this visit.    No orders to display     Vitals:    03/06/22 1000 03/06/22 1259 03/06/22 1300 03/06/22 1330   BP: 119/81  153/100 147/99   BP Location:    Left arm   Patient Position:   Sitting Sitting   Pulse:   90 84   Resp:       Temp:       TempSrc:       SpO2: 94% 99%  99% 90%   Weight:       Height:         Medications   sodium chloride 0.9 % flush 10 mL (has no administration in time range)   sodium chloride 0.9 % bolus 1,000 mL (0 mL Intravenous Stopped 3/6/22 1030)   ondansetron (ZOFRAN) injection 4 mg (4 mg Intravenous Given 3/6/22 0934)   famotidine (PEPCID) injection 20 mg (20 mg Intravenous Given 3/6/22 0934)   sodium chloride 0.9 % bolus 1,000 mL (0 mL Intravenous Stopped 3/6/22 1110)   potassium chloride (MICRO-K) CR capsule 40 mEq (40 mEq Oral Given 3/6/22 1030)   insulin regular (humuLIN R,novoLIN R) injection 5 Units (5 Units Intravenous Given 3/6/22 1108)   sodium chloride 0.9 % bolus 1,000 mL (0 mL Intravenous Stopped 3/6/22 1241)     ECG/EMG Results (last 24 hours)     ** No results found for the last 24 hours. **        No orders to display       DISCHARGE    Patient discharged in stable condition.    Reviewed implications of results, diagnosis, meds, responsibility to follow up, warning signs and symptoms of possible worsening, potential complications and reasons to return to ER.    Patient/Family voiced understanding of above instructions.    Discussed plan for discharge, as there is no emergent indication for admission.  Pt/family is agreeable and understands need for follow up and possible repeat testing.  Pt/family is aware that discharge does not mean that nothing is wrong but that it indicates no emergency is currently present that requires admission and they must continue care with follow-up as given below or with a physician of their choice.     FOLLOW-UP  PATIENT CONNECTION - [place] [address]  [phone]  Schedule an appointment as soon as possible for a visit   ESTABLISH A PCP [facility] ENDOCRINOLOGY  [phone]  Schedule an appointment as soon as possible for a visit       [facility] Emergency Department  [address] [address] [phone]    If symptoms worsen          Medication List      New Prescriptions    potassium chloride 10 MEQ CR tablet  Take 1 tablet by mouth Daily for 5 days.           Where to Get Your Medications      These medications were sent to [facility][id number] - [address]  AT [facility] - [phone] [facility] [phone] [address] [facility] [address]    Phone: [phone]   · metFORMIN 1000 MG tablet  · ondansetron 4 MG tablet  · potassium chloride 10 MEQ CR tablet                                                  MDM    Final diagnoses:   Non-intractable vomiting with nausea, unspecified vomiting type   Hyperglycemia due to diabetes mellitus (HCC)   Hypokalemia   Dehydration       ED Disposition  ED Disposition     ED Disposition   Discharge    Condition   Stable    Comment   --             PATIENT CONNECTION - [place] [address]  [phone]  Schedule an appointment as soon as possible for a visit   ESTABLISH A PCP [facility] ENDOCRINOLOGY  [phone]  Schedule an appointment as soon as possible for a visit       [facility] Emergency Department  [address] [address] [facility] [address] [phone]    If symptoms worsen         Medication List      New Prescriptions    potassium chloride 10 MEQ CR tablet  Take 1 tablet by mouth Daily for 5 days.           Where to Get Your Medications      [id number] - [address]  AT [facility] - [phone] [facility] [phone] [address] [facility] [address]    Phone: [phone]   · metFORMIN 1000 MG tablet  · ondansetron 4 MG tablet  · potassium chloride 10 MEQ CR tablet          Bertha Elliott PA  03/06/22 1436

## 2022-03-09 ENCOUNTER — HOSPITAL ENCOUNTER (EMERGENCY)
Facility: HOSPITAL | Age: 27
Discharge: HOME | End: 2022-03-09
Attending: EMERGENCY MEDICINE | Admitting: EMERGENCY MEDICINE

## 2022-03-09 VITALS
WEIGHT: 160 LBS | BODY MASS INDEX: 22.4 KG/M2 | TEMPERATURE: 98.2 F | HEART RATE: 134 BPM | OXYGEN SATURATION: 96 % | DIASTOLIC BLOOD PRESSURE: 107 MMHG | HEIGHT: 71 IN | RESPIRATION RATE: 18 BRPM | SYSTOLIC BLOOD PRESSURE: 143 MMHG

## 2022-03-09 DIAGNOSIS — R11.2 NAUSEA AND VOMITING, INTRACTABILITY OF VOMITING NOT SPECIFIED, UNSPECIFIED VOMITING TYPE: ICD-10-CM

## 2022-03-09 DIAGNOSIS — R73.9 HYPERGLYCEMIA: Primary | ICD-10-CM

## 2022-03-09 LAB
ALBUMIN SERPL-MCNC: 4.8 G/DL (ref 3.5–5.2)
ALBUMIN/GLOB SERPL: 1.5 G/DL
ALP SERPL-CCNC: 79 U/L (ref 39–117)
ALT SERPL W P-5'-P-CCNC: 24 U/L (ref 1–41)
ANION GAP SERPL CALCULATED.3IONS-SCNC: 17 MMOL/L (ref 5–15)
AST SERPL-CCNC: 13 U/L (ref 1–40)
B-OH-BUTYR SERPL-SCNC: 2.23 MMOL/L (ref 0.02–0.27)
BACTERIA UR QL AUTO: NORMAL /HPF
BASOPHILS # BLD AUTO: 0.02 10*3/MM3 (ref 0–0.2)
BASOPHILS NFR BLD AUTO: 0.2 % (ref 0–1.5)
BILIRUB SERPL-MCNC: 1.2 MG/DL (ref 0–1.2)
BILIRUB UR QL STRIP: NEGATIVE
BUN SERPL-MCNC: 18 MG/DL (ref 6–20)
BUN/CREAT SERPL: 24.3 (ref 7–25)
CALCIUM SPEC-SCNC: 10.1 MG/DL (ref 8.6–10.5)
CHLORIDE SERPL-SCNC: 88 MMOL/L (ref 98–107)
CLARITY UR: CLEAR
CO2 SERPL-SCNC: 28 MMOL/L (ref 22–29)
COLOR UR: YELLOW
CREAT SERPL-MCNC: 0.74 MG/DL (ref 0.76–1.27)
D-LACTATE SERPL-SCNC: 1.7 MMOL/L (ref 0.5–2)
DEPRECATED RDW RBC AUTO: 36 FL (ref 37–54)
EGFRCR SERPLBLD CKD-EPI 2021: 128.2 ML/MIN/1.73
EOSINOPHIL # BLD AUTO: 0 10*3/MM3 (ref 0–0.4)
EOSINOPHIL NFR BLD AUTO: 0 % (ref 0.3–6.2)
ERYTHROCYTE [DISTWIDTH] IN BLOOD BY AUTOMATED COUNT: 11.9 % (ref 12.3–15.4)
GLOBULIN UR ELPH-MCNC: 3.1 GM/DL
GLUCOSE BLDC GLUCOMTR-MCNC: 272 MG/DL (ref 70–130)
GLUCOSE SERPL-MCNC: 295 MG/DL (ref 65–99)
GLUCOSE UR STRIP-MCNC: ABNORMAL MG/DL
HBA1C MFR BLD: 10.5 % (ref 4.8–5.6)
HCT VFR BLD AUTO: 44.6 % (ref 37.5–51)
HGB BLD-MCNC: 16 G/DL (ref 13–17.7)
HGB UR QL STRIP.AUTO: NEGATIVE
HOLD SPECIMEN: NORMAL
HYALINE CASTS UR QL AUTO: NORMAL /LPF
IMM GRANULOCYTES # BLD AUTO: 0.04 10*3/MM3 (ref 0–0.05)
IMM GRANULOCYTES NFR BLD AUTO: 0.5 % (ref 0–0.5)
KETONES UR QL STRIP: ABNORMAL
LEUKOCYTE ESTERASE UR QL STRIP.AUTO: NEGATIVE
LIPASE SERPL-CCNC: 13 U/L (ref 13–60)
LYMPHOCYTES # BLD AUTO: 1.47 10*3/MM3 (ref 0.7–3.1)
LYMPHOCYTES NFR BLD AUTO: 17.4 % (ref 19.6–45.3)
MCH RBC QN AUTO: 29.9 PG (ref 26.6–33)
MCHC RBC AUTO-ENTMCNC: 35.9 G/DL (ref 31.5–35.7)
MCV RBC AUTO: 83.4 FL (ref 79–97)
MONOCYTES # BLD AUTO: 0.77 10*3/MM3 (ref 0.1–0.9)
MONOCYTES NFR BLD AUTO: 9.1 % (ref 5–12)
NEUTROPHILS NFR BLD AUTO: 6.13 10*3/MM3 (ref 1.7–7)
NEUTROPHILS NFR BLD AUTO: 72.8 % (ref 42.7–76)
NITRITE UR QL STRIP: NEGATIVE
NRBC BLD AUTO-RTO: 0 /100 WBC (ref 0–0.2)
PH UR STRIP.AUTO: 6 [PH] (ref 5–8)
PLATELET # BLD AUTO: 285 10*3/MM3 (ref 140–450)
PMV BLD AUTO: 10.3 FL (ref 6–12)
POTASSIUM SERPL-SCNC: 3.4 MMOL/L (ref 3.5–5.2)
PROT SERPL-MCNC: 7.9 G/DL (ref 6–8.5)
PROT UR QL STRIP: ABNORMAL
RBC # BLD AUTO: 5.35 10*6/MM3 (ref 4.14–5.8)
RBC # UR STRIP: NORMAL /HPF
REF LAB TEST METHOD: NORMAL
SODIUM SERPL-SCNC: 133 MMOL/L (ref 136–145)
SP GR UR STRIP: 1.02 (ref 1–1.03)
SQUAMOUS #/AREA URNS HPF: NORMAL /HPF
UROBILINOGEN UR QL STRIP: ABNORMAL
WBC # UR STRIP: NORMAL /HPF
WBC NRBC COR # BLD: 8.43 10*3/MM3 (ref 3.4–10.8)
WHOLE BLOOD HOLD SPECIMEN: NORMAL
WHOLE BLOOD HOLD SPECIMEN: NORMAL

## 2022-03-09 PROCEDURE — 83690 ASSAY OF LIPASE: CPT | Performed by: EMERGENCY MEDICINE

## 2022-03-09 PROCEDURE — 80053 COMPREHEN METABOLIC PANEL: CPT | Performed by: EMERGENCY MEDICINE

## 2022-03-09 PROCEDURE — 85025 COMPLETE CBC W/AUTO DIFF WBC: CPT | Performed by: EMERGENCY MEDICINE

## 2022-03-09 PROCEDURE — 81001 URINALYSIS AUTO W/SCOPE: CPT | Performed by: EMERGENCY MEDICINE

## 2022-03-09 PROCEDURE — 25010000002 ONDANSETRON PER 1 MG: Performed by: NURSE PRACTITIONER

## 2022-03-09 PROCEDURE — 25010000002 PROCHLORPERAZINE 10 MG/2ML SOLUTION: Performed by: NURSE PRACTITIONER

## 2022-03-09 PROCEDURE — 99283 EMERGENCY DEPT VISIT LOW MDM: CPT

## 2022-03-09 PROCEDURE — 83036 HEMOGLOBIN GLYCOSYLATED A1C: CPT | Performed by: NURSE PRACTITIONER

## 2022-03-09 PROCEDURE — 96375 TX/PRO/DX INJ NEW DRUG ADDON: CPT

## 2022-03-09 PROCEDURE — 83605 ASSAY OF LACTIC ACID: CPT | Performed by: NURSE PRACTITIONER

## 2022-03-09 PROCEDURE — 82010 KETONE BODYS QUAN: CPT | Performed by: NURSE PRACTITIONER

## 2022-03-09 PROCEDURE — 96374 THER/PROPH/DIAG INJ IV PUSH: CPT

## 2022-03-09 PROCEDURE — 82962 GLUCOSE BLOOD TEST: CPT

## 2022-03-09 RX ORDER — PROCHLORPERAZINE EDISYLATE 5 MG/ML
10 INJECTION INTRAMUSCULAR; INTRAVENOUS ONCE
Status: COMPLETED | OUTPATIENT
Start: 2022-03-09 | End: 2022-03-09

## 2022-03-09 RX ORDER — SODIUM CHLORIDE 9 MG/ML
10 INJECTION INTRAVENOUS AS NEEDED
Status: DISCONTINUED | OUTPATIENT
Start: 2022-03-09 | End: 2022-03-09 | Stop reason: HOSPADM

## 2022-03-09 RX ORDER — PROMETHAZINE HYDROCHLORIDE 25 MG/1
25 TABLET ORAL EVERY 6 HOURS PRN
Qty: 12 TABLET | Refills: 0 | Status: SHIPPED | OUTPATIENT
Start: 2022-03-09 | End: 2022-04-16 | Stop reason: SDUPTHER

## 2022-03-09 RX ORDER — ONDANSETRON 2 MG/ML
4 INJECTION INTRAMUSCULAR; INTRAVENOUS ONCE
Status: COMPLETED | OUTPATIENT
Start: 2022-03-09 | End: 2022-03-09

## 2022-03-09 RX ADMIN — SODIUM CHLORIDE 1000 ML: 9 INJECTION, SOLUTION INTRAVENOUS at 07:40

## 2022-03-09 RX ADMIN — ONDANSETRON 4 MG: 2 INJECTION INTRAMUSCULAR; INTRAVENOUS at 07:40

## 2022-03-09 RX ADMIN — PROCHLORPERAZINE EDISYLATE 10 MG: 5 INJECTION INTRAMUSCULAR; INTRAVENOUS at 09:36

## 2022-03-09 RX ADMIN — SODIUM CHLORIDE 1000 ML: 9 INJECTION, SOLUTION INTRAVENOUS at 10:17

## 2022-03-09 NOTE — ED PROVIDER NOTES
Subjective   Patient presents to the ER with nausea vomiting.  He was here recently for hypoglycemia.  He reports he not feeling better and was able to go home.  He tells me he has not had his insulin in over a year.  He tells me he is able to afford it he just has been hardheaded and has not been taking it.  He tells me he went to a [employer] show recently and had 3 beers and he thinks that is what put him over the edge.  He typically does not drink.  Denies any chest pain fever cough.  He has had polydipsia and polyuria.      Hyperglycemia  Severity:  Moderate  Onset quality:  Gradual  Duration:  1 week  Timing:  Constant  Progression:  Worsening  Chronicity:  New  Context: noncompliance    Relieved by:  Nothing  Ineffective treatments:  None tried  Associated symptoms: increased thirst, nausea, polyuria, vomiting and weakness    Associated symptoms: no abdominal pain, no chest pain, no diaphoresis, no dysuria, no fever and no shortness of breath        Review of Systems   Constitutional: Negative for chills, diaphoresis and fever.   HENT: Negative for congestion and sore throat.    Respiratory: Negative for cough, choking, chest tightness, shortness of breath and wheezing.    Cardiovascular: Negative for chest pain and leg swelling.   Gastrointestinal: Positive for nausea and vomiting. Negative for abdominal distention, abdominal pain, anal bleeding, blood in stool, constipation and diarrhea.   Endocrine: Positive for polydipsia and polyuria.   Genitourinary: Negative for difficulty urinating, dysuria, flank pain, frequency, hematuria and urgency.   Neurological: Positive for weakness.   All other systems reviewed and are negative.      Past Medical History:   Diagnosis Date   • Diabetes mellitus (HCC)     PT DENIES THIS DX, PREVIOUS DOCUMENTED   • GI (gastrointestinal bleed)        No Known Allergies    History reviewed. No pertinent surgical history.    Family History   Problem Relation Age of Onset   • Diabetes  Mother    • No Known Problems Father    • Colon cancer Neg Hx    • Esophageal cancer Neg Hx    • Inflammatory bowel disease Neg Hx    • Irritable bowel syndrome Neg Hx    • Ulcerative colitis Neg Hx        Social History     Socioeconomic History   • Marital status: Single   Tobacco Use   • Smoking status: Former Smoker   • Smokeless tobacco: Never Used   Vaping Use   • Vaping Use: Never used   Substance and Sexual Activity   • Alcohol use: Yes     Comment: SOCIALLY   • Drug use: No   • Sexual activity: Yes     Partners: Female           Objective   Physical Exam  Constitutional:       Appearance: He is well-developed. He is ill-appearing.   HENT:      Head: Normocephalic and atraumatic.      Right Ear: External ear normal.      Left Ear: External ear normal.      Nose: Nose normal.   Eyes:      Conjunctiva/sclera: Conjunctivae normal.      Pupils: Pupils are equal, round, and reactive to light.   Cardiovascular:      Rate and Rhythm: Normal rate and regular rhythm.      Heart sounds: Normal heart sounds.   Pulmonary:      Effort: Pulmonary effort is normal.      Breath sounds: Normal breath sounds.   Abdominal:      General: Bowel sounds are normal.      Palpations: Abdomen is soft.   Musculoskeletal:         General: Normal range of motion.      Cervical back: Normal range of motion and neck supple.   Skin:     General: Skin is warm and dry.   Neurological:      Mental Status: He is alert and oriented to person, place, and time.   Psychiatric:         Behavior: Behavior normal.         Judgment: Judgment normal.         Procedures           ED Course  ED Course as of 03/09/22 1235   Wed Mar 09, 2022   1231 Patient resting comfortably.  Had a long conversation with the patient we discussed his blood work.  Tells me he feels substantially better he declines admission and wants to go home.  He is not overtly in DKA and he is aware of the signs and symptoms of that.  Did discuss with him the benefits of admission  however he declines and and wants to go home.  We will respect his wishes.  I will refill his insulin which she says he has been on Levemir in the past and he has the means to get this refilled.  He would also like a work note and for me to mention the work note to have him work dayshift.  Patient well aware the signs of worsening issue.  All thankful and agreeable.  He tells me he has testing strips and glucometer at home. [JM]   1233 He is able to walk around the room just fine he is currently getting dressed try to get a ride. [JM]      ED Course User Index  [DEX] Joaquim Silva APRN                                                 [facility]    Final diagnoses:   Hyperglycemia   Nausea and vomiting, intractability of vomiting not specified, unspecified vomiting type       ED Disposition  ED Disposition     ED Disposition   Discharge    Condition   Stable    Comment   --             PATIENT CONNECTION - [place] [address]  [phone]  Schedule an appointment as soon as possible for a visit       Sukhjinder Orozco MD  [facility] [place] [id number] [phone]    Schedule an appointment as soon as possible for a visit       [facility] Emergency Department  [address] [place] [id number] [phone]    If symptoms worsen         Medication List      New Prescriptions    promethazine 25 MG tablet  Commonly known as: PHENERGAN  Take 1 tablet by mouth Every 6 (Six) Hours As Needed for Nausea or Vomiting.           Where to Get Your Medications      These medications were sent to [facility] #[id number] - [place], [place] - [address] [facility] [address] [facility]  AT [facility] - [phone]  - [phone]   [address] [facility] [address]    Phone: [phone]   · insulin detemir 100 UNIT/ML injection  · promethazine 25 MG tablet          Joaquim Silva APRN  03/09/22 3430

## 2022-03-26 ENCOUNTER — APPOINTMENT (OUTPATIENT)
Dept: GENERAL RADIOLOGY | Facility: HOSPITAL | Age: 27
End: 2022-03-26

## 2022-03-26 ENCOUNTER — HOSPITAL ENCOUNTER (EMERGENCY)
Facility: HOSPITAL | Age: 27
Discharge: HOME | End: 2022-03-26
Attending: EMERGENCY MEDICINE | Admitting: EMERGENCY MEDICINE

## 2022-03-26 VITALS
HEIGHT: 71 IN | SYSTOLIC BLOOD PRESSURE: 150 MMHG | RESPIRATION RATE: 14 BRPM | TEMPERATURE: 98.5 F | BODY MASS INDEX: 23.24 KG/M2 | OXYGEN SATURATION: 97 % | HEART RATE: 128 BPM | DIASTOLIC BLOOD PRESSURE: 108 MMHG | WEIGHT: 166 LBS

## 2022-03-26 DIAGNOSIS — R11.2 NON-INTRACTABLE VOMITING WITH NAUSEA, UNSPECIFIED VOMITING TYPE: ICD-10-CM

## 2022-03-26 DIAGNOSIS — E87.6 HYPOKALEMIA: ICD-10-CM

## 2022-03-26 DIAGNOSIS — R73.9 HYPERGLYCEMIA: Primary | ICD-10-CM

## 2022-03-26 LAB
ALBUMIN SERPL-MCNC: 5.6 G/DL (ref 3.5–5.2)
ALBUMIN/GLOB SERPL: 2.1 G/DL
ALP SERPL-CCNC: 80 U/L (ref 39–117)
ALT SERPL W P-5'-P-CCNC: 68 U/L (ref 1–41)
ANION GAP SERPL CALCULATED.3IONS-SCNC: 17 MMOL/L (ref 5–15)
AST SERPL-CCNC: 18 U/L (ref 1–40)
ATMOSPHERIC PRESS: ABNORMAL MM[HG]
BACTERIA UR QL AUTO: NORMAL /HPF
BASE EXCESS BLDV CALC-SCNC: 8.8 MMOL/L (ref -2–2)
BASOPHILS # BLD AUTO: 0.01 10*3/MM3 (ref 0–0.2)
BASOPHILS NFR BLD AUTO: 0.1 % (ref 0–1.5)
BDY SITE: ABNORMAL
BILIRUB SERPL-MCNC: 1.2 MG/DL (ref 0–1.2)
BILIRUB UR QL STRIP: NEGATIVE
BODY TEMPERATURE: 37 C
BUN BLDA-MCNC: 23 MG/DL (ref 8–26)
BUN SERPL-MCNC: 23 MG/DL (ref 6–20)
BUN/CREAT SERPL: 31.1 (ref 7–25)
CA-I BLDA-SCNC: 1.15 MMOL/L (ref 1.2–1.32)
CALCIUM SPEC-SCNC: 10.6 MG/DL (ref 8.6–10.5)
CHLORIDE BLDA-SCNC: 92 MMOL/L (ref 98–109)
CHLORIDE SERPL-SCNC: 90 MMOL/L (ref 98–107)
CLARITY UR: CLEAR
CO2 BLDA-SCNC: 28 MMOL/L (ref 24–29)
CO2 BLDA-SCNC: 33.3 MMOL/L (ref 22–33)
CO2 SERPL-SCNC: 30 MMOL/L (ref 22–29)
COHGB MFR BLD: 1 %
COLOR UR: YELLOW
CREAT BLDA-MCNC: 0.6 MG/DL (ref 0.6–1.3)
CREAT SERPL-MCNC: 0.74 MG/DL (ref 0.76–1.27)
DEPRECATED RDW RBC AUTO: 39.4 FL (ref 37–54)
EGFRCR SERPLBLD CKD-EPI 2021: 128.2 ML/MIN/1.73
EGFRCR SERPLBLD CKD-EPI 2021: 136.5 ML/MIN/1.73
EOSINOPHIL # BLD AUTO: 0 10*3/MM3 (ref 0–0.4)
EOSINOPHIL NFR BLD AUTO: 0 % (ref 0.3–6.2)
EPAP: 0
ERYTHROCYTE [DISTWIDTH] IN BLOOD BY AUTOMATED COUNT: 13.2 % (ref 12.3–15.4)
GLOBULIN UR ELPH-MCNC: 2.7 GM/DL
GLUCOSE BLDC GLUCOMTR-MCNC: 244 MG/DL (ref 70–130)
GLUCOSE BLDC GLUCOMTR-MCNC: 335 MG/DL (ref 70–130)
GLUCOSE BLDC GLUCOMTR-MCNC: 351 MG/DL (ref 70–130)
GLUCOSE SERPL-MCNC: 353 MG/DL (ref 65–99)
GLUCOSE UR STRIP-MCNC: ABNORMAL MG/DL
HCO3 BLDV-SCNC: 32.1 MMOL/L (ref 22–28)
HCT VFR BLD AUTO: 43.3 % (ref 37.5–51)
HCT VFR BLDA CALC: 46 % (ref 38–51)
HGB BLD-MCNC: 15.7 G/DL (ref 13–17.7)
HGB BLDA-MCNC: 15.6 G/DL (ref 12–17)
HGB BLDA-MCNC: 15.7 G/DL (ref 13.5–17.5)
HGB UR QL STRIP.AUTO: NEGATIVE
HYALINE CASTS UR QL AUTO: NORMAL /LPF
IMM GRANULOCYTES # BLD AUTO: 0.03 10*3/MM3 (ref 0–0.05)
IMM GRANULOCYTES NFR BLD AUTO: 0.4 % (ref 0–0.5)
INHALED O2 CONCENTRATION: 21 %
IPAP: 0
KETONES UR QL STRIP: ABNORMAL
LEUKOCYTE ESTERASE UR QL STRIP.AUTO: NEGATIVE
LIPASE SERPL-CCNC: 9 U/L (ref 13–60)
LYMPHOCYTES # BLD AUTO: 0.6 10*3/MM3 (ref 0.7–3.1)
LYMPHOCYTES NFR BLD AUTO: 7.5 % (ref 19.6–45.3)
Lab: ABNORMAL
MAGNESIUM SERPL-MCNC: 1.9 MG/DL (ref 1.6–2.6)
MCH RBC QN AUTO: 29.9 PG (ref 26.6–33)
MCHC RBC AUTO-ENTMCNC: 36.3 G/DL (ref 31.5–35.7)
MCV RBC AUTO: 82.5 FL (ref 79–97)
METHGB BLD QL: 0.4 %
MODALITY: ABNORMAL
MONOCYTES # BLD AUTO: 0.63 10*3/MM3 (ref 0.1–0.9)
MONOCYTES NFR BLD AUTO: 7.8 % (ref 5–12)
NEUTROPHILS NFR BLD AUTO: 6.76 10*3/MM3 (ref 1.7–7)
NEUTROPHILS NFR BLD AUTO: 84.2 % (ref 42.7–76)
NITRITE UR QL STRIP: NEGATIVE
NOTE: ABNORMAL
NOTIFIED BY: ABNORMAL
NOTIFIED WHO: ABNORMAL
NRBC BLD AUTO-RTO: 0 /100 WBC (ref 0–0.2)
OXYHGB MFR BLDV: 59.1 %
PAW @ PEAK INSP FLOW SETTING VENT: 0 CMH2O
PCO2 BLDV: 38.3 MM HG (ref 41–51)
PH BLDV: 7.53 PH UNITS (ref 7.31–7.41)
PH UR STRIP.AUTO: 7 [PH] (ref 5–8)
PLATELET # BLD AUTO: 248 10*3/MM3 (ref 140–450)
PMV BLD AUTO: 10.2 FL (ref 6–12)
PO2 BLDV: 28.2 MM HG (ref 27–53)
POTASSIUM BLDA-SCNC: 3.2 MMOL/L (ref 3.5–4.9)
POTASSIUM SERPL-SCNC: 3.5 MMOL/L (ref 3.5–5.2)
PROT SERPL-MCNC: 8.3 G/DL (ref 6–8.5)
PROT UR QL STRIP: ABNORMAL
RBC # BLD AUTO: 5.25 10*6/MM3 (ref 4.14–5.8)
RBC # UR STRIP: NORMAL /HPF
REF LAB TEST METHOD: NORMAL
SODIUM BLD-SCNC: 134 MMOL/L (ref 138–146)
SODIUM SERPL-SCNC: 137 MMOL/L (ref 136–145)
SP GR UR STRIP: 1.03 (ref 1–1.03)
SQUAMOUS #/AREA URNS HPF: NORMAL /HPF
TOTAL RATE: 0 BREATHS/MINUTE
UROBILINOGEN UR QL STRIP: ABNORMAL
WBC # UR STRIP: NORMAL /HPF
WBC NRBC COR # BLD: 8.03 10*3/MM3 (ref 3.4–10.8)

## 2022-03-26 PROCEDURE — 25010000002 ONDANSETRON PER 1 MG: Performed by: EMERGENCY MEDICINE

## 2022-03-26 PROCEDURE — 85014 HEMATOCRIT: CPT

## 2022-03-26 PROCEDURE — 83735 ASSAY OF MAGNESIUM: CPT | Performed by: EMERGENCY MEDICINE

## 2022-03-26 PROCEDURE — 83690 ASSAY OF LIPASE: CPT | Performed by: EMERGENCY MEDICINE

## 2022-03-26 PROCEDURE — 85025 COMPLETE CBC W/AUTO DIFF WBC: CPT | Performed by: EMERGENCY MEDICINE

## 2022-03-26 PROCEDURE — 80053 COMPREHEN METABOLIC PANEL: CPT | Performed by: EMERGENCY MEDICINE

## 2022-03-26 PROCEDURE — 82962 GLUCOSE BLOOD TEST: CPT

## 2022-03-26 PROCEDURE — 96374 THER/PROPH/DIAG INJ IV PUSH: CPT

## 2022-03-26 PROCEDURE — 99283 EMERGENCY DEPT VISIT LOW MDM: CPT

## 2022-03-26 PROCEDURE — 81001 URINALYSIS AUTO W/SCOPE: CPT | Performed by: EMERGENCY MEDICINE

## 2022-03-26 PROCEDURE — 71045 X-RAY EXAM CHEST 1 VIEW: CPT

## 2022-03-26 PROCEDURE — 82805 BLOOD GASES W/O2 SATURATION: CPT

## 2022-03-26 PROCEDURE — 80047 BASIC METABLC PNL IONIZED CA: CPT

## 2022-03-26 PROCEDURE — 96375 TX/PRO/DX INJ NEW DRUG ADDON: CPT

## 2022-03-26 PROCEDURE — 25010000002 METOCLOPRAMIDE PER 10 MG: Performed by: EMERGENCY MEDICINE

## 2022-03-26 RX ORDER — METOCLOPRAMIDE HYDROCHLORIDE 5 MG/ML
10 INJECTION INTRAMUSCULAR; INTRAVENOUS ONCE
Status: COMPLETED | OUTPATIENT
Start: 2022-03-26 | End: 2022-03-26

## 2022-03-26 RX ORDER — POTASSIUM CHLORIDE 750 MG/1
20 CAPSULE, EXTENDED RELEASE ORAL ONCE
Status: COMPLETED | OUTPATIENT
Start: 2022-03-26 | End: 2022-03-26

## 2022-03-26 RX ORDER — SODIUM CHLORIDE 0.9 % (FLUSH) 0.9 %
10 SYRINGE (ML) INJECTION AS NEEDED
Status: DISCONTINUED | OUTPATIENT
Start: 2022-03-26 | End: 2022-03-26 | Stop reason: HOSPADM

## 2022-03-26 RX ORDER — METOCLOPRAMIDE 5 MG/1
5 TABLET ORAL 3 TIMES DAILY PRN
Qty: 30 TABLET | Refills: 0 | Status: SHIPPED | OUTPATIENT
Start: 2022-03-26 | End: 2022-03-31

## 2022-03-26 RX ORDER — ONDANSETRON 2 MG/ML
4 INJECTION INTRAMUSCULAR; INTRAVENOUS ONCE
Status: COMPLETED | OUTPATIENT
Start: 2022-03-26 | End: 2022-03-26

## 2022-03-26 RX ADMIN — POTASSIUM CHLORIDE 20 MEQ: 750 CAPSULE, EXTENDED RELEASE ORAL at 10:31

## 2022-03-26 RX ADMIN — ONDANSETRON 4 MG: 2 INJECTION INTRAMUSCULAR; INTRAVENOUS at 09:56

## 2022-03-26 RX ADMIN — SODIUM CHLORIDE 1000 ML: 9 INJECTION, SOLUTION INTRAVENOUS at 10:35

## 2022-03-26 RX ADMIN — POTASSIUM CHLORIDE 20 MEQ: 750 CAPSULE, EXTENDED RELEASE ORAL at 10:30

## 2022-03-26 RX ADMIN — SODIUM CHLORIDE 1000 ML: 9 INJECTION, SOLUTION INTRAVENOUS at 09:56

## 2022-03-26 RX ADMIN — METOCLOPRAMIDE 10 MG: 5 INJECTION, SOLUTION INTRAMUSCULAR; INTRAVENOUS at 10:35

## 2022-03-26 NOTE — ED PROVIDER NOTES
"Subjective   27-year-old male presents for evaluation of generalized weakness, nausea/vomiting, and \"dehydration.\"  Of note, the patient was diagnosed with type 2 diabetes approximately 2 years ago and states that he has been quite noncompliant since being diagnosed.  He was seen here in the emergency department recently and given a prescription for Metformin which she has not been taking.  He denies any fevers.  He denies any known sick contacts.  He states that on Friday he began experiencing nausea and vomiting and has continued to feel ill since that time, prompting his visit to the emergency department today as he \"felt dehydrated.\"  He denies any accompanying diarrhea.  No cough.  No fever.  No abdominal pain.  He checked his blood sugar at home last night and noted it to be quite elevated.  As a result, he took 25 units of his mother's Levemir to try to help with his hyperglycemia.  He endorses polydipsia.          Review of Systems   Gastrointestinal: Positive for nausea and vomiting.   Endocrine: Positive for polydipsia.   Neurological: Positive for weakness.   All other systems reviewed and are negative.      Past Medical History:   Diagnosis Date   • Diabetes mellitus (HCC)     PT DENIES THIS DX, PREVIOUS DOCUMENTED   • GI (gastrointestinal bleed)        No Known Allergies    History reviewed. No pertinent surgical history.    Family History   Problem Relation Age of Onset   • Diabetes Mother    • No Known Problems Father    • Colon cancer Neg Hx    • Esophageal cancer Neg Hx    • Inflammatory bowel disease Neg Hx    • Irritable bowel syndrome Neg Hx    • Ulcerative colitis Neg Hx        Social History     Socioeconomic History   • Marital status: Single   Tobacco Use   • Smoking status: Former Smoker   • Smokeless tobacco: Never Used   Vaping Use   • Vaping Use: Never used   Substance and Sexual Activity   • Alcohol use: Yes     Comment: SOCIALLY   • Drug use: No   • Sexual activity: Yes     Partners: " "Female           Objective   Physical Exam  Vitals and nursing note reviewed.   Constitutional:       General: He is not in acute distress.     Appearance: He is well-developed. He is not diaphoretic.      Comments: Nontoxic-appearing male   HENT:      Head: Normocephalic and atraumatic.   Neck:      Vascular: No JVD.   Cardiovascular:      Rate and Rhythm: Regular rhythm. Tachycardia present.      Heart sounds: Normal heart sounds. No murmur heard.    No friction rub. No gallop.   Pulmonary:      Effort: Pulmonary effort is normal. No respiratory distress.      Breath sounds: Normal breath sounds. No wheezing or rales.   Abdominal:      General: Bowel sounds are normal. There is no distension.      Palpations: Abdomen is soft. There is no mass.      Tenderness: There is no abdominal tenderness. There is no guarding.      Comments: No focal abdominal tenderness, no peritoneal signs, no pain out of proportion to exam   Musculoskeletal:         General: Normal range of motion.      Cervical back: Normal range of motion.   Skin:     General: Skin is warm and dry.      Coloration: Skin is not pale.      Findings: No erythema or rash.   Neurological:      General: No focal deficit present.      Mental Status: He is alert and oriented to person, place, and time.   Psychiatric:         Mood and Affect: Mood normal.         Thought Content: Thought content normal.         Judgment: Judgment normal.         Procedures           ED Course  ED Course as of 03/26/22 1421   Sat Mar 26, 2022   1011 27-year-old male presents for evaluation of generalized weakness, \"dehydration,\" and nausea/vomiting.  Of note, the patient has a history of type 2 diabetes as well as a history of noncompliance.  On arrival to the ED, the patient is hyperglycemic but nontoxic-appearing.  Nonsurgical abdomen.  Voice mucous membranes.  The patient is hyperglycemic but not acidotic.  IV fluids given.  Potassium given.  Antiemetics given. [DD]   1100 I " personally viewed the patient's x-ray images myself, and I am in agreement with the radiologist's reading for final interpretation.     [DD]   1126 Blood glucose is downtrending.  The patient is not acidotic.  He is tolerating p.o.  Reassured and counseled regarding symptomatic management.  Encouraged compliance with his medications.  Prescription for Reglan as needed.  He will follow-up with his primary care physician within the next week.  Agreeable with plan and given appropriate strict return precautions. [DD]      ED Course User Index  [DD] Jame Dumont MD                                         Recent Results (from the past 24 hour(s))   POC Glucose Once    Collection Time: 03/26/22  9:37 AM    Specimen: Blood   Result Value Ref Range    Glucose 335 (H) 70 - 130 mg/dL   Comprehensive Metabolic Panel    Collection Time: 03/26/22  9:38 AM    Specimen: Blood   Result Value Ref Range    Glucose 353 (H) 65 - 99 mg/dL    BUN 23 (H) 6 - 20 mg/dL    Creatinine 0.74 (L) 0.76 - 1.27 mg/dL    Sodium 137 136 - 145 mmol/L    Potassium 3.5 3.5 - 5.2 mmol/L    Chloride 90 (L) 98 - 107 mmol/L    CO2 30.0 (H) 22.0 - 29.0 mmol/L    Calcium 10.6 (H) 8.6 - 10.5 mg/dL    Total Protein 8.3 6.0 - 8.5 g/dL    Albumin 5.60 (H) 3.50 - 5.20 g/dL    ALT (SGPT) 68 (H) 1 - 41 U/L    AST (SGOT) 18 1 - 40 U/L    Alkaline Phosphatase 80 39 - 117 U/L    Total Bilirubin 1.2 0.0 - 1.2 mg/dL    Globulin 2.7 gm/dL    A/G Ratio 2.1 g/dL    BUN/Creatinine Ratio 31.1 (H) 7.0 - 25.0    Anion Gap 17.0 (H) 5.0 - 15.0 mmol/L    eGFR 128.2 >60.0 mL/min/1.73   Magnesium    Collection Time: 03/26/22  9:38 AM    Specimen: Blood   Result Value Ref Range    Magnesium 1.9 1.6 - 2.6 mg/dL   CBC Auto Differential    Collection Time: 03/26/22  9:38 AM    Specimen: Blood   Result Value Ref Range    WBC 8.03 3.40 - 10.80 10*3/mm3    RBC 5.25 4.14 - 5.80 10*6/mm3    Hemoglobin 15.7 13.0 - 17.7 g/dL    Hematocrit 43.3 37.5 - 51.0 %    MCV 82.5 79.0 - 97.0 fL     MCH 29.9 26.6 - 33.0 pg    MCHC 36.3 (H) 31.5 - 35.7 g/dL    RDW 13.2 12.3 - 15.4 %    RDW-SD 39.4 37.0 - 54.0 fl    MPV 10.2 6.0 - 12.0 fL    Platelets 248 140 - 450 10*3/mm3    Neutrophil % 84.2 (H) 42.7 - 76.0 %    Lymphocyte % 7.5 (L) 19.6 - 45.3 %    Monocyte % 7.8 5.0 - 12.0 %    Eosinophil % 0.0 (L) 0.3 - 6.2 %    Basophil % 0.1 0.0 - 1.5 %    Immature Grans % 0.4 0.0 - 0.5 %    Neutrophils, Absolute 6.76 1.70 - 7.00 10*3/mm3    Lymphocytes, Absolute 0.60 (L) 0.70 - 3.10 10*3/mm3    Monocytes, Absolute 0.63 0.10 - 0.90 10*3/mm3    Eosinophils, Absolute 0.00 0.00 - 0.40 10*3/mm3    Basophils, Absolute 0.01 0.00 - 0.20 10*3/mm3    Immature Grans, Absolute 0.03 0.00 - 0.05 10*3/mm3    nRBC 0.0 0.0 - 0.2 /100 WBC   Lipase    Collection Time: 03/26/22  9:38 AM    Specimen: Blood   Result Value Ref Range    Lipase 9 (L) 13 - 60 U/L   POC CHEM 8    Collection Time: 03/26/22  9:59 AM    Specimen: Blood   Result Value Ref Range    Glucose 351 (H) 70 - 130 mg/dL    BUN 23 8 - 26 mg/dL    Creatinine 0.60 0.60 - 1.30 mg/dL    Sodium 134 (L) 138 - 146 mmol/L    POC Potassium 3.2 (L) 3.5 - 4.9 mmol/L    Chloride 92 (L) 98 - 109 mmol/L    Total CO2 28 24 - 29 mmol/L    Hemoglobin 15.6 12.0 - 17.0 g/dL    Hematocrit 46 38 - 51 %    Ionized Calcium 1.15 (L) 1.20 - 1.32 mmol/L    eGFR 136.5 >60.0 mL/min/1.73   Blood Gas, Venous With Co-Ox    Collection Time: 03/26/22 10:05 AM    Specimen: Venous Blood   Result Value Ref Range    Site Nurse/Dr Draw     pH, Venous 7.532 (H) 7.310 - 7.410 pH Units    pCO2, Venous 38.3 (L) 41.0 - 51.0 mm Hg    pO2, Venous 28.2 27.0 - 53.0 mm Hg    HCO3, Venous 32.1 (H) 22.0 - 28.0 mmol/L    Base Excess, Venous 8.8 (H) -2.0 - 2.0 mmol/L    Hemoglobin, Blood Gas 15.7 13.5 - 17.5 g/dL    Oxyhemoglobin Venous 59.1 %    Methemoglobin Venous 0.4 %    Carboxyhemoglobin Venous 1.0 %    CO2 Content 33.3 (H) 22 - 33 mmol/L    Temperature 37.0 C    Barometric Pressure for Blood Gas      Modality Room Air      FIO2 21 %    Rate 0 Breaths/minute    PIP 0 cmH2O    IPAP 0     EPAP 0     Note      Notified Who GIA LUCERO RN     Notified By [id number]     Notified Time 03/26/2022 10:14    POC Glucose Once    Collection Time: 03/26/22 11:20 AM    Specimen: Blood   Result Value Ref Range    Glucose 244 (H) 70 - 130 mg/dL   Urinalysis With Culture If Indicated - Urine, Clean Catch    Collection Time: 03/26/22 11:23 AM    Specimen: Urine, Clean Catch   Result Value Ref Range    Color, UA Yellow Yellow, Straw    Appearance, UA Clear Clear    pH, UA 7.0 5.0 - 8.0    Specific Gravity, UA 1.032 (H) 1.001 - 1.030    Glucose, UA >=1000 mg/dL (3+) (A) Negative    Ketones, UA 80 mg/dL (3+) (A) Negative    Bilirubin, UA Negative Negative    Blood, UA Negative Negative    Protein, UA 30 mg/dL (1+) (A) Negative    Leuk Esterase, UA Negative Negative    Nitrite, UA Negative Negative    Urobilinogen, UA 1.0 E.U./dL 0.2 - 1.0 E.U./dL   Urinalysis, Microscopic Only - Urine, Clean Catch    Collection Time: 03/26/22 11:23 AM    Specimen: Urine, Clean Catch   Result Value Ref Range    RBC, UA 0-2 None Seen, 0-2 /HPF    WBC, UA 0-2 None Seen, 0-2 /HPF    Bacteria, UA None Seen None Seen, Trace /HPF    Squamous Epithelial Cells, UA None Seen None Seen, 0-2 /HPF    Hyaline Casts, UA 0-6 0 - 6 /LPF    Methodology Automated Microscopy      Note: In addition to lab results from this visit, the labs listed above may include labs taken at another facility or during a different encounter within the last 24 hours. Please correlate lab times with ED admission and discharge times for further clarification of the services performed during this visit.    XR Chest 1 View   Final Result   1.  An acute pulmonary process is not apparent.       This report was finalized on 3/26/2022 10:49 AM by Gm Quiroz MD.            Vitals:    03/26/22 0939 03/26/22 1001 03/26/22 1115 03/26/22 1215   BP: (!) 145/106  (!) 166/103 (!) 150/108   BP Location:       Patient Position:        Pulse:  107 111 (!) 128   Resp:  18  14   Temp:       TempSrc:       SpO2:   95% 97%   Weight:       Height:         Medications   sodium chloride 0.9 % bolus 1,000 mL (0 mL Intravenous Stopped 3/26/22 1037)   sodium chloride 0.9 % bolus 1,000 mL (0 mL Intravenous Stopped 3/26/22 1122)   ondansetron (ZOFRAN) injection 4 mg (4 mg Intravenous Given 3/26/22 0956)   metoclopramide (REGLAN) injection 10 mg (10 mg Intravenous Given 3/26/22 1035)   potassium chloride (MICRO-K) CR capsule 20 mEq (20 mEq Oral Given 3/26/22 1031)   potassium chloride (MICRO-K) CR capsule 20 mEq (20 mEq Oral Given 3/26/22 1030)     ECG/EMG Results (last 24 hours)     ** No results found for the last 24 hours. **        No orders to display               MDM    Final diagnoses:   Hyperglycemia   Hypokalemia   Non-intractable vomiting with nausea, unspecified vomiting type       ED Disposition  ED Disposition     ED Disposition   Discharge    Condition   Stable    Comment   --             PATIENT CONNECTION - [place] [id number] [phone]  In 1 week           Medication List      New Prescriptions    metoclopramide 5 MG tablet  Commonly known as: REGLAN  Take 1 tablet by mouth 3 (Three) Times a Day As Needed (N/V).        Changed    metFORMIN 1000 MG tablet  Commonly known as: GLUCOPHAGE  Take 1 tablet by mouth 2 (Two) Times a Day With Meals for 60 days.  What changed: additional instructions           Where to Get Your Medications      These medications were sent to [id number] - [place], [place] - [address]  AT [facility] - [phone]  - [phone]  [address] , [facility] [address]    Phone: [phone]   · metoclopramide 5 MG tablet          Jame Dumont MD  03/26/22 7445

## 2022-03-31 ENCOUNTER — OFFICE VISIT (OUTPATIENT)
Dept: INTERNAL MEDICINE | Facility: CLINIC | Age: 27
End: 2022-03-31

## 2022-03-31 VITALS
OXYGEN SATURATION: 98 % | TEMPERATURE: 98.3 F | SYSTOLIC BLOOD PRESSURE: 112 MMHG | WEIGHT: 148 LBS | HEIGHT: 71 IN | HEART RATE: 98 BPM | BODY MASS INDEX: 20.72 KG/M2 | RESPIRATION RATE: 16 BRPM | DIASTOLIC BLOOD PRESSURE: 80 MMHG

## 2022-03-31 DIAGNOSIS — Z79.4 TYPE 2 DIABETES MELLITUS WITH HYPERGLYCEMIA, WITH LONG-TERM CURRENT USE OF INSULIN: ICD-10-CM

## 2022-03-31 DIAGNOSIS — K21.9 GASTROESOPHAGEAL REFLUX DISEASE, UNSPECIFIED WHETHER ESOPHAGITIS PRESENT: ICD-10-CM

## 2022-03-31 DIAGNOSIS — E11.65 TYPE 2 DIABETES MELLITUS WITH HYPERGLYCEMIA, WITH LONG-TERM CURRENT USE OF INSULIN: ICD-10-CM

## 2022-03-31 DIAGNOSIS — Z76.89 ENCOUNTER TO ESTABLISH CARE: Primary | ICD-10-CM

## 2022-03-31 LAB — GLUCOSE BLDC GLUCOMTR-MCNC: 192 MG/DL (ref 70–130)

## 2022-03-31 PROCEDURE — 82947 ASSAY GLUCOSE BLOOD QUANT: CPT

## 2022-03-31 PROCEDURE — 99214 OFFICE O/P EST MOD 30 MIN: CPT

## 2022-03-31 RX ORDER — LANCETS 28 GAUGE
EACH MISCELLANEOUS
Qty: 100 EACH | Refills: 0 | Status: SHIPPED | OUTPATIENT
Start: 2022-03-31

## 2022-03-31 RX ORDER — OMEPRAZOLE 20 MG/1
20 CAPSULE, DELAYED RELEASE ORAL DAILY
Qty: 90 CAPSULE | Refills: 0 | Status: SHIPPED | OUTPATIENT
Start: 2022-03-31 | End: 2022-04-19 | Stop reason: SDUPTHER

## 2022-03-31 RX ORDER — FLURBIPROFEN SODIUM 0.3 MG/ML
1 SOLUTION/ DROPS OPHTHALMIC NIGHTLY
Qty: 30 EACH | Refills: 0 | Status: SHIPPED | OUTPATIENT
Start: 2022-03-31

## 2022-03-31 NOTE — PROGRESS NOTES
Chief Complaint  Transitional Care Management (A1C 10.5 on 10/9/22 at hospital admission) and Establish Care    Ford Mukherjee Jr. presents to [facility]    Prior PCP: Unsure.     HPI: Presented to the ED on 3/26/22. Was found to be hyperglycemic with blood glucose in the 300's. Was experiencing nausea and vomiting. CXR was negative. IV fluids given.  Potassium given. Antiemetic given. Has been taking metformin each day since being discharged. Has not been using levermir because he prefers a pen. Feels okay today. Has not been checking BG at home because he does not have a glucometer. A1c on 3/9/22 was 10.5%. Endorses polyuria, polyphagia, and polydipsia. Denies neuropathies or poor wound healing. Does not do anything for exercise. Reports that diet has been poor. Does state that he has been very non-compliant with his DM regimens in the past.     GI- Experiencing frequent reflux and water brash each day. Unsure of which foods cause this. Has tried Tums with some improvement in symptoms. No alarm S/S.     Subjective         Health Maintenance   Topic   • URINE MICROALBUMIN    • ANNUAL PHYSICAL    • HEPATITIS C SCREENING    • DIABETIC FOOT EXAM    • DIABETIC EYE EXAM    • COVID-19 Vaccine (3 - Booster for Pfizer series)   • TDAP/TD VACCINES (1 - Tdap)   • Hepatitis B (3 of 3 - Risk 3-dose series)   • Pneumococcal Vaccine 0-64 (1 of 2 - PPSV23)   • HEMOGLOBIN A1C    • INFLUENZA VACCINE        [facility]  The following portions of the patient's history were reviewed and updated as appropriate: allergies, current medications, past family history, past medical history, past social history, past surgical history and problem list.     Past Medical History:   Diagnosis Date   • Diabetes mellitus (HCC)     PT DENIES THIS DX, PREVIOUS DOCUMENTED   • GI (gastrointestinal bleed)       No Known Allergies   Social History     Tobacco Use   • Smoking status: Former Smoker   • Smokeless tobacco:  Never Used   Vaping Use   • Vaping Use: Never used   Substance Use Topics   • Alcohol use: Yes     Comment: SOCIALLY   • Drug use: No     History reviewed. No pertinent surgical history.   Family History   Problem Relation Age of Onset   • Diabetes Mother    • No Known Problems Father    • Colon cancer Neg Hx    • Esophageal cancer Neg Hx    • Inflammatory bowel disease Neg Hx    • Irritable bowel syndrome Neg Hx    • Ulcerative colitis Neg Hx          Current Outpatient Medications:   •  glucose blood test strip, Check BG one time per day., Disp: 100 each, Rfl: 0  •  Insulin Pen Needle (B-D UF III MINI PEN NEEDLES) 31G X 5 MM misc, 1 pen Every Night., Disp: 30 each, Rfl: 0  •  Lancets (freestyle) lancets, Check BG once per day., Disp: 100 each, Rfl: 0  •  metFORMIN (GLUCOPHAGE) 1000 MG tablet, Take 1 tablet by mouth 2 (Two) Times a Day With Meals for 60 days., Disp: 120 tablet, Rfl: 0  •  promethazine (PHENERGAN) 25 MG tablet, Take 1 tablet by mouth Every 6 (Six) Hours As Needed for Nausea or Vomiting., Disp: 12 tablet, Rfl: 0  •  Blood Glucose Monitoring Suppl w/Device kit, Check BG one time per day., Disp: 1 each, Rfl: 0  •  insulin detemir (LEVEMIR) 100 UNIT/ML injection, Inject 10 Units under the skin into the appropriate area as directed Every Night., Disp: 1 pen, Rfl: 2  •  omeprazole (priLOSEC) 20 MG capsule, Take 1 capsule by mouth Daily., Disp: 90 capsule, Rfl: 0    Review of Systems   Constitutional: Negative for activity change, appetite change, chills, fatigue and fever.   Respiratory: Negative for apnea, cough, choking, chest tightness, shortness of breath, wheezing and stridor.    Cardiovascular: Negative for chest pain, palpitations and leg swelling.   Gastrointestinal: Positive for GERD and indigestion. Negative for abdominal distention, abdominal pain, anal bleeding, blood in stool, constipation, diarrhea, nausea, rectal pain and vomiting.   Endocrine: Positive for polydipsia, polyphagia and  "polyuria. Negative for cold intolerance and heat intolerance.   Allergic/Immunologic: Negative for environmental allergies and food allergies.   Neurological: Negative for numbness.   Hematological: Negative for adenopathy. Does not bruise/bleed easily.       Objective   Vital Signs  /80   Pulse 98   Temp 98.3 °F (36.8 °C)   Resp 16   Ht 180.3 cm (71\")   Wt 67.1 kg (148 lb)   SpO2 98%   BMI 20.64 kg/m²     Physical Exam  Constitutional:       Appearance: Normal appearance.   HENT:      Head: Normocephalic.      Mouth/Throat:      Mouth: Mucous membranes are moist.      Pharynx: Oropharynx is clear.   Eyes:      Extraocular Movements: Extraocular movements intact.      Conjunctiva/sclera: Conjunctivae normal.      Pupils: Pupils are equal, round, and reactive to light.   Cardiovascular:      Rate and Rhythm: Normal rate and regular rhythm.      Pulses: Normal pulses.      Heart sounds: Normal heart sounds.   Pulmonary:      Effort: Pulmonary effort is normal.      Breath sounds: Normal breath sounds.   Skin:     General: Skin is warm and dry.      Capillary Refill: Capillary refill takes less than 2 seconds.   Neurological:      Mental Status: He is alert and oriented to person, place, and time.   Psychiatric:         Mood and Affect: Mood normal.         Behavior: Behavior normal.         Thought Content: Thought content normal.         Judgment: Judgment normal.          Result Review :     The following data was reviewed by: IKE Hanks on 03/31/2022:  Most Recent A1C    HGBA1C Most Recent 3/9/22   Hemoglobin A1C 10.50 (A)   (A) Abnormal value              Assessment and Plan    1. Type 2 diabetes mellitus with hyperglycemia, with long-term current use of insulin (HCC)  - Lancets (freestyle) lancets; Check BG once per day.  Dispense: 100 each; Refill: 0  - glucose blood test strip; Check BG one time per day.  Dispense: 100 each; Refill: 0  - Blood Glucose Monitoring Suppl w/Device kit; Check BG " one time per day.  Dispense: 1 each; Refill: 0  - insulin detemir (LEVEMIR) 100 UNIT/ML injection; Inject 10 Units under the skin into the appropriate area as directed Every Night.  Dispense: 1 pen; Refill: 2  - Comprehensive Metabolic Panel; Future  - TSH Rfx On Abnormal To Free T4; Future  - CBC & Differential; Future  - Insulin Pen Needle (B-D UF III MINI PEN NEEDLES) 31G X 5 MM misc; 1 pen Every Night.  Dispense: 30 each; Refill: 0  - Lipid Panel; Future  - POCT Glucose  -  in office today. Will provide levemir pen to use in addition to metformin. Encouraged to begin to monitor blood glucose at home obtaining a combination of both fasting blood glucoses (upon awakening/before eating) and post-prandial blood glucoses (2 hours after beginning of meals). FBGs should be between  and post-prandial blood glucoses should be less than 180 to ensure that A1C is less than 7%.   - Encouraged to consume a diet low in saturated fat (13 g max), refined carbohydrates, and fructose corn syrup. Consume a diet high in fiber (30g/day), lean protein, and fresh produce intake. Increase physical activity to include 150 mins per week (30 mins per day 5 days per week) of moderate intensity aerobic activity and resistance training 2-3 x per week.   - Encouraged to monitor for symptoms of hypoglycemia (BG less than 70) such as rapid heart rate (HR over 100), sweating, and confusion. If hypoglycemia occurs ingest 15-20 gm of fast-acting carbohydrates (ex. 3 glucose tablets, 4 oz or 120 mL of fruit juice or regular soda, 6 or 7 hard candies, or 1 tablespoon of sugar) and recheck BG in 15 minutes.   - Follow up with a complex carbohydrate once BG is stable (over 70).   - Encouraged to perform foot inspection and foot care daily.  UTD with annual eye exam(no), urine microalbumin (will obtain at PE), and diabetic foot exam (no)    Goals for you:   • A1C less than 7%  • FPG should be   • PP should be less than 180  • BP  should be less than 130/80  • LDL less than 100  • Albumin/creatinine ratio less than 30 mg/g  • Abstain from smoking and alcohol use  • Annual dilated eye exam  • Annual diabetic foot exam     Education performed during this visit includes long term diabetic complications and routine health maintenance including annual eye exams with ophthalmology, appropriate dental hygiene, and foot care. Medications, including side effects and compliance discussed carefully and Hypoglycemia management and prevention reviewed.   Discussed the nature of the disease including, risks, complications, implications, management, safe and proper use of medications. Encouraged therapeutic lifestyle changes including low calorie diet with plenty of fruits and vegetables, daily exercise, medication compliance, and keeping scheduled follow up appointments as indicated. Encouraged patient to have appointment for complete physical, fasting labs, appropriate screenings, and immunizations on an annual basis.    2. Gastroesophageal reflux disease, unspecified whether esophagitis present  - omeprazole (priLOSEC) 20 MG capsule; Take 1 capsule by mouth Daily.  Dispense: 90 capsule; Refill: 0  - Uncontrolled. Will trial PPI and evaluate for improvement. Encouraged to avoid trigger foods.     3. Encounter to establish care  - Hepatitis C Antibody; Future    Follow Up     Return in about 4 weeks (around 4/28/2022) for Annual.    Patient was given instructions and counseling regarding his condition or for health maintenance advice. Please see specific information pulled into the AVS if appropriate.    Part of this note may be an electronic transcription/translation of spoken language to printed text using the Dragon Dictation System.    Electronically signed by:   IKE Hanks  03/31/2022

## 2022-04-15 ENCOUNTER — HOSPITAL ENCOUNTER (EMERGENCY)
Facility: HOSPITAL | Age: 27
Discharge: HOME | End: 2022-04-15
Attending: EMERGENCY MEDICINE | Admitting: EMERGENCY MEDICINE

## 2022-04-15 VITALS
OXYGEN SATURATION: 98 % | RESPIRATION RATE: 16 BRPM | WEIGHT: 140 LBS | TEMPERATURE: 97.9 F | DIASTOLIC BLOOD PRESSURE: 112 MMHG | HEIGHT: 71 IN | SYSTOLIC BLOOD PRESSURE: 146 MMHG | HEART RATE: 133 BPM | BODY MASS INDEX: 19.6 KG/M2

## 2022-04-15 DIAGNOSIS — E11.65 TYPE 2 DIABETES MELLITUS WITH HYPERGLYCEMIA, WITH LONG-TERM CURRENT USE OF INSULIN: ICD-10-CM

## 2022-04-15 DIAGNOSIS — Z79.4 TYPE 2 DIABETES MELLITUS WITH HYPERGLYCEMIA, WITH LONG-TERM CURRENT USE OF INSULIN: ICD-10-CM

## 2022-04-15 DIAGNOSIS — R11.2 NAUSEA AND VOMITING, UNSPECIFIED VOMITING TYPE: ICD-10-CM

## 2022-04-15 DIAGNOSIS — E86.0 DEHYDRATION: Primary | ICD-10-CM

## 2022-04-15 LAB
ALBUMIN SERPL-MCNC: 5.7 G/DL (ref 3.5–5.2)
ALBUMIN/GLOB SERPL: 2.1 G/DL
ALP SERPL-CCNC: 83 U/L (ref 39–117)
ALT SERPL W P-5'-P-CCNC: 45 U/L (ref 1–41)
ANION GAP SERPL CALCULATED.3IONS-SCNC: 16 MMOL/L (ref 5–15)
AST SERPL-CCNC: 15 U/L (ref 1–40)
B-OH-BUTYR SERPL-SCNC: 1.1 MMOL/L (ref 0.02–0.27)
BACTERIA UR QL AUTO: NORMAL /HPF
BASOPHILS # BLD AUTO: 0.01 10*3/MM3 (ref 0–0.2)
BASOPHILS NFR BLD AUTO: 0.1 % (ref 0–1.5)
BILIRUB SERPL-MCNC: 0.8 MG/DL (ref 0–1.2)
BILIRUB UR QL STRIP: NEGATIVE
BUN BLDA-MCNC: 18 MG/DL (ref 8–26)
BUN SERPL-MCNC: 17 MG/DL (ref 6–20)
BUN/CREAT SERPL: 23.3 (ref 7–25)
CA-I BLDA-SCNC: 1.2 MMOL/L (ref 1.2–1.32)
CALCIUM SPEC-SCNC: 10.8 MG/DL (ref 8.6–10.5)
CHLORIDE BLDA-SCNC: 100 MMOL/L (ref 98–109)
CHLORIDE SERPL-SCNC: 93 MMOL/L (ref 98–107)
CLARITY UR: CLEAR
CO2 BLDA-SCNC: 26 MMOL/L (ref 24–29)
CO2 SERPL-SCNC: 27 MMOL/L (ref 22–29)
COLOR UR: YELLOW
CREAT BLDA-MCNC: 0.5 MG/DL (ref 0.6–1.3)
CREAT SERPL-MCNC: 0.73 MG/DL (ref 0.76–1.27)
DEPRECATED RDW RBC AUTO: 39.3 FL (ref 37–54)
EGFRCR SERPLBLD CKD-EPI 2021: 128.7 ML/MIN/1.73
EGFRCR SERPLBLD CKD-EPI 2021: 144.3 ML/MIN/1.73
EOSINOPHIL # BLD AUTO: 0.01 10*3/MM3 (ref 0–0.4)
EOSINOPHIL NFR BLD AUTO: 0.1 % (ref 0.3–6.2)
ERYTHROCYTE [DISTWIDTH] IN BLOOD BY AUTOMATED COUNT: 13.2 % (ref 12.3–15.4)
GLOBULIN UR ELPH-MCNC: 2.7 GM/DL
GLUCOSE BLDC GLUCOMTR-MCNC: 150 MG/DL (ref 70–130)
GLUCOSE BLDC GLUCOMTR-MCNC: 257 MG/DL (ref 70–130)
GLUCOSE SERPL-MCNC: 271 MG/DL (ref 65–99)
GLUCOSE UR STRIP-MCNC: ABNORMAL MG/DL
HBA1C MFR BLD: 9.8 % (ref 4.8–5.6)
HCT VFR BLD AUTO: 42.8 % (ref 37.5–51)
HCT VFR BLDA CALC: 43 % (ref 38–51)
HGB BLD-MCNC: 15.6 G/DL (ref 13–17.7)
HGB BLDA-MCNC: 14.6 G/DL (ref 12–17)
HGB UR QL STRIP.AUTO: NEGATIVE
HOLD SPECIMEN: NORMAL
HYALINE CASTS UR QL AUTO: NORMAL /LPF
IMM GRANULOCYTES # BLD AUTO: 0.03 10*3/MM3 (ref 0–0.05)
IMM GRANULOCYTES NFR BLD AUTO: 0.3 % (ref 0–0.5)
KETONES UR QL STRIP: ABNORMAL
LEUKOCYTE ESTERASE UR QL STRIP.AUTO: NEGATIVE
LIPASE SERPL-CCNC: 7 U/L (ref 13–60)
LYMPHOCYTES # BLD AUTO: 0.87 10*3/MM3 (ref 0.7–3.1)
LYMPHOCYTES NFR BLD AUTO: 8.6 % (ref 19.6–45.3)
MAGNESIUM SERPL-MCNC: 1.7 MG/DL (ref 1.6–2.6)
MCH RBC QN AUTO: 29.8 PG (ref 26.6–33)
MCHC RBC AUTO-ENTMCNC: 36.4 G/DL (ref 31.5–35.7)
MCV RBC AUTO: 81.8 FL (ref 79–97)
MONOCYTES # BLD AUTO: 0.62 10*3/MM3 (ref 0.1–0.9)
MONOCYTES NFR BLD AUTO: 6.1 % (ref 5–12)
NEUTROPHILS NFR BLD AUTO: 8.6 10*3/MM3 (ref 1.7–7)
NEUTROPHILS NFR BLD AUTO: 84.8 % (ref 42.7–76)
NITRITE UR QL STRIP: NEGATIVE
NRBC BLD AUTO-RTO: 0 /100 WBC (ref 0–0.2)
PH UR STRIP.AUTO: 8 [PH] (ref 5–8)
PHOSPHATE SERPL-MCNC: 3 MG/DL (ref 2.5–4.5)
PLATELET # BLD AUTO: 264 10*3/MM3 (ref 140–450)
PMV BLD AUTO: 10 FL (ref 6–12)
POTASSIUM BLDA-SCNC: 3.1 MMOL/L (ref 3.5–4.9)
POTASSIUM SERPL-SCNC: 3.6 MMOL/L (ref 3.5–5.2)
PROT SERPL-MCNC: 8.4 G/DL (ref 6–8.5)
PROT UR QL STRIP: ABNORMAL
RBC # BLD AUTO: 5.23 10*6/MM3 (ref 4.14–5.8)
RBC # UR STRIP: NORMAL /HPF
REF LAB TEST METHOD: NORMAL
SODIUM BLD-SCNC: 138 MMOL/L (ref 138–146)
SODIUM SERPL-SCNC: 136 MMOL/L (ref 136–145)
SP GR UR STRIP: 1.03 (ref 1–1.03)
SQUAMOUS #/AREA URNS HPF: NORMAL /HPF
UROBILINOGEN UR QL STRIP: ABNORMAL
WBC # UR STRIP: NORMAL /HPF
WBC NRBC COR # BLD: 10.14 10*3/MM3 (ref 3.4–10.8)
WHOLE BLOOD HOLD SPECIMEN: NORMAL
WHOLE BLOOD HOLD SPECIMEN: NORMAL

## 2022-04-15 PROCEDURE — 96374 THER/PROPH/DIAG INJ IV PUSH: CPT

## 2022-04-15 PROCEDURE — 80047 BASIC METABLC PNL IONIZED CA: CPT

## 2022-04-15 PROCEDURE — 96375 TX/PRO/DX INJ NEW DRUG ADDON: CPT

## 2022-04-15 PROCEDURE — 83690 ASSAY OF LIPASE: CPT | Performed by: EMERGENCY MEDICINE

## 2022-04-15 PROCEDURE — 83735 ASSAY OF MAGNESIUM: CPT | Performed by: EMERGENCY MEDICINE

## 2022-04-15 PROCEDURE — 82010 KETONE BODYS QUAN: CPT | Performed by: EMERGENCY MEDICINE

## 2022-04-15 PROCEDURE — 85014 HEMATOCRIT: CPT

## 2022-04-15 PROCEDURE — 82962 GLUCOSE BLOOD TEST: CPT

## 2022-04-15 PROCEDURE — 83036 HEMOGLOBIN GLYCOSYLATED A1C: CPT | Performed by: EMERGENCY MEDICINE

## 2022-04-15 PROCEDURE — 84100 ASSAY OF PHOSPHORUS: CPT | Performed by: EMERGENCY MEDICINE

## 2022-04-15 PROCEDURE — 81001 URINALYSIS AUTO W/SCOPE: CPT | Performed by: EMERGENCY MEDICINE

## 2022-04-15 PROCEDURE — 99283 EMERGENCY DEPT VISIT LOW MDM: CPT

## 2022-04-15 PROCEDURE — 25010000002 ONDANSETRON PER 1 MG: Performed by: EMERGENCY MEDICINE

## 2022-04-15 PROCEDURE — 36415 COLL VENOUS BLD VENIPUNCTURE: CPT

## 2022-04-15 PROCEDURE — 85025 COMPLETE CBC W/AUTO DIFF WBC: CPT | Performed by: EMERGENCY MEDICINE

## 2022-04-15 PROCEDURE — 80053 COMPREHEN METABOLIC PANEL: CPT | Performed by: EMERGENCY MEDICINE

## 2022-04-15 PROCEDURE — 63710000001 INSULIN REGULAR HUMAN PER 5 UNITS: Performed by: EMERGENCY MEDICINE

## 2022-04-15 RX ORDER — PROMETHAZINE HYDROCHLORIDE 25 MG/1
25 SUPPOSITORY RECTAL EVERY 6 HOURS PRN
Qty: 10 SUPPOSITORY | Refills: 0 | Status: SHIPPED | OUTPATIENT
Start: 2022-04-15 | End: 2022-04-16 | Stop reason: SDUPTHER

## 2022-04-15 RX ORDER — SODIUM CHLORIDE 9 MG/ML
10 INJECTION INTRAVENOUS AS NEEDED
Status: DISCONTINUED | OUTPATIENT
Start: 2022-04-15 | End: 2022-04-16 | Stop reason: HOSPADM

## 2022-04-15 RX ORDER — ONDANSETRON 2 MG/ML
4 INJECTION INTRAMUSCULAR; INTRAVENOUS ONCE
Status: COMPLETED | OUTPATIENT
Start: 2022-04-15 | End: 2022-04-15

## 2022-04-15 RX ORDER — FAMOTIDINE 10 MG/ML
20 INJECTION, SOLUTION INTRAVENOUS ONCE
Status: COMPLETED | OUTPATIENT
Start: 2022-04-15 | End: 2022-04-15

## 2022-04-15 RX ORDER — ONDANSETRON 4 MG/1
4 TABLET, ORALLY DISINTEGRATING ORAL EVERY 6 HOURS PRN
Qty: 15 TABLET | Refills: 0 | Status: SHIPPED | OUTPATIENT
Start: 2022-04-15 | End: 2022-07-28

## 2022-04-15 RX ADMIN — INSULIN HUMAN 5 UNITS: 100 INJECTION, SOLUTION PARENTERAL at 20:53

## 2022-04-15 RX ADMIN — ONDANSETRON 4 MG: 2 INJECTION INTRAMUSCULAR; INTRAVENOUS at 20:10

## 2022-04-15 RX ADMIN — FAMOTIDINE 20 MG: 10 INJECTION INTRAVENOUS at 20:09

## 2022-04-15 RX ADMIN — SODIUM CHLORIDE, POTASSIUM CHLORIDE, SODIUM LACTATE AND CALCIUM CHLORIDE 2000 ML: 600; 310; 30; 20 INJECTION, SOLUTION INTRAVENOUS at 20:13

## 2022-04-16 ENCOUNTER — APPOINTMENT (OUTPATIENT)
Dept: CT IMAGING | Facility: HOSPITAL | Age: 27
End: 2022-04-16

## 2022-04-16 ENCOUNTER — HOSPITAL ENCOUNTER (EMERGENCY)
Facility: HOSPITAL | Age: 27
Discharge: HOME | End: 2022-04-16
Attending: EMERGENCY MEDICINE | Admitting: EMERGENCY MEDICINE

## 2022-04-16 VITALS
HEIGHT: 71 IN | WEIGHT: 145 LBS | TEMPERATURE: 98.4 F | BODY MASS INDEX: 20.3 KG/M2 | HEART RATE: 129 BPM | DIASTOLIC BLOOD PRESSURE: 103 MMHG | RESPIRATION RATE: 17 BRPM | OXYGEN SATURATION: 99 % | SYSTOLIC BLOOD PRESSURE: 143 MMHG

## 2022-04-16 DIAGNOSIS — E11.65 TYPE 2 DIABETES MELLITUS WITH HYPERGLYCEMIA, WITH LONG-TERM CURRENT USE OF INSULIN: ICD-10-CM

## 2022-04-16 DIAGNOSIS — R11.2 NAUSEA AND VOMITING, UNSPECIFIED VOMITING TYPE: Primary | ICD-10-CM

## 2022-04-16 DIAGNOSIS — Z79.4 TYPE 2 DIABETES MELLITUS WITH HYPERGLYCEMIA, WITH LONG-TERM CURRENT USE OF INSULIN: ICD-10-CM

## 2022-04-16 LAB
ALBUMIN SERPL-MCNC: 5.4 G/DL (ref 3.5–5.2)
ALBUMIN/GLOB SERPL: 2.3 G/DL
ALP SERPL-CCNC: 76 U/L (ref 39–117)
ALT SERPL W P-5'-P-CCNC: 38 U/L (ref 1–41)
ANION GAP SERPL CALCULATED.3IONS-SCNC: 17 MMOL/L (ref 5–15)
ARTERIAL PATENCY WRIST A: ABNORMAL
AST SERPL-CCNC: 14 U/L (ref 1–40)
ATMOSPHERIC PRESS: ABNORMAL MM[HG]
B-OH-BUTYR SERPL-SCNC: 0.83 MMOL/L (ref 0.02–0.27)
BASE EXCESS BLDA CALC-SCNC: 3.3 MMOL/L (ref 0–2)
BASOPHILS # BLD AUTO: 0.01 10*3/MM3 (ref 0–0.2)
BASOPHILS NFR BLD AUTO: 0.1 % (ref 0–1.5)
BDY SITE: ABNORMAL
BILIRUB SERPL-MCNC: 0.9 MG/DL (ref 0–1.2)
BILIRUB UR QL STRIP: NEGATIVE
BODY TEMPERATURE: 37 C
BUN SERPL-MCNC: 14 MG/DL (ref 6–20)
BUN/CREAT SERPL: 20.9 (ref 7–25)
CALCIUM SPEC-SCNC: 10.2 MG/DL (ref 8.6–10.5)
CHLORIDE SERPL-SCNC: 97 MMOL/L (ref 98–107)
CLARITY UR: CLEAR
CO2 BLDA-SCNC: 27.1 MMOL/L (ref 22–33)
CO2 SERPL-SCNC: 25 MMOL/L (ref 22–29)
COHGB MFR BLD: 0.8 % (ref 0–2)
COLOR UR: YELLOW
CREAT SERPL-MCNC: 0.67 MG/DL (ref 0.76–1.27)
D-LACTATE SERPL-SCNC: 1.6 MMOL/L (ref 0.5–2)
DEPRECATED RDW RBC AUTO: 39.6 FL (ref 37–54)
EGFRCR SERPLBLD CKD-EPI 2021: 132.1 ML/MIN/1.73
EOSINOPHIL # BLD AUTO: 0 10*3/MM3 (ref 0–0.4)
EOSINOPHIL NFR BLD AUTO: 0 % (ref 0.3–6.2)
EPAP: 0
ERYTHROCYTE [DISTWIDTH] IN BLOOD BY AUTOMATED COUNT: 13 % (ref 12.3–15.4)
GLOBULIN UR ELPH-MCNC: 2.4 GM/DL
GLUCOSE BLDC GLUCOMTR-MCNC: 151 MG/DL (ref 70–130)
GLUCOSE BLDC GLUCOMTR-MCNC: 155 MG/DL (ref 70–130)
GLUCOSE SERPL-MCNC: 228 MG/DL (ref 65–99)
GLUCOSE UR STRIP-MCNC: ABNORMAL MG/DL
HCO3 BLDA-SCNC: 26.1 MMOL/L (ref 20–26)
HCT VFR BLD AUTO: 40.3 % (ref 37.5–51)
HCT VFR BLD CALC: 39.2 % (ref 38–51)
HGB BLD-MCNC: 14.3 G/DL (ref 13–17.7)
HGB BLDA-MCNC: 12.8 G/DL (ref 13.5–17.5)
HGB UR QL STRIP.AUTO: NEGATIVE
HOLD SPECIMEN: NORMAL
IMM GRANULOCYTES # BLD AUTO: 0.05 10*3/MM3 (ref 0–0.05)
IMM GRANULOCYTES NFR BLD AUTO: 0.4 % (ref 0–0.5)
INHALED O2 CONCENTRATION: 21 %
IPAP: 0
KETONES UR QL STRIP: ABNORMAL
LEUKOCYTE ESTERASE UR QL STRIP.AUTO: NEGATIVE
LIPASE SERPL-CCNC: 18 U/L (ref 13–60)
LYMPHOCYTES # BLD AUTO: 1.29 10*3/MM3 (ref 0.7–3.1)
LYMPHOCYTES NFR BLD AUTO: 11.2 % (ref 19.6–45.3)
MCH RBC QN AUTO: 29.7 PG (ref 26.6–33)
MCHC RBC AUTO-ENTMCNC: 35.5 G/DL (ref 31.5–35.7)
MCV RBC AUTO: 83.6 FL (ref 79–97)
METHGB BLD QL: 0.5 % (ref 0–1.5)
MODALITY: ABNORMAL
MONOCYTES # BLD AUTO: 0.77 10*3/MM3 (ref 0.1–0.9)
MONOCYTES NFR BLD AUTO: 6.7 % (ref 5–12)
NEUTROPHILS NFR BLD AUTO: 81.6 % (ref 42.7–76)
NEUTROPHILS NFR BLD AUTO: 9.43 10*3/MM3 (ref 1.7–7)
NITRITE UR QL STRIP: NEGATIVE
NOTE: ABNORMAL
NRBC BLD AUTO-RTO: 0 /100 WBC (ref 0–0.2)
OXYHGB MFR BLDV: 95.4 % (ref 94–99)
PAW @ PEAK INSP FLOW SETTING VENT: 0 CMH2O
PCO2 BLDA: 33.2 MM HG (ref 35–45)
PCO2 TEMP ADJ BLD: 33.2 MM HG (ref 35–48)
PH BLDA: 7.5 PH UNITS (ref 7.35–7.45)
PH UR STRIP.AUTO: 8 [PH] (ref 5–8)
PH, TEMP CORRECTED: 7.5 PH UNITS
PLATELET # BLD AUTO: 252 10*3/MM3 (ref 140–450)
PMV BLD AUTO: 10.1 FL (ref 6–12)
PO2 BLDA: 77.9 MM HG (ref 83–108)
PO2 TEMP ADJ BLD: 77.9 MM HG (ref 83–108)
POTASSIUM SERPL-SCNC: 3.3 MMOL/L (ref 3.5–5.2)
PROT SERPL-MCNC: 7.8 G/DL (ref 6–8.5)
PROT UR QL STRIP: NEGATIVE
RBC # BLD AUTO: 4.82 10*6/MM3 (ref 4.14–5.8)
SODIUM SERPL-SCNC: 139 MMOL/L (ref 136–145)
SP GR UR STRIP: 1.04 (ref 1–1.03)
TOTAL RATE: 0 BREATHS/MINUTE
UROBILINOGEN UR QL STRIP: ABNORMAL
WBC NRBC COR # BLD: 11.55 10*3/MM3 (ref 3.4–10.8)
WHOLE BLOOD HOLD SPECIMEN: NORMAL
WHOLE BLOOD HOLD SPECIMEN: NORMAL

## 2022-04-16 PROCEDURE — 25010000002 IOPAMIDOL 61 % SOLUTION: Performed by: EMERGENCY MEDICINE

## 2022-04-16 PROCEDURE — 0 POTASSIUM CHLORIDE 10 MEQ/100ML SOLUTION: Performed by: PHYSICIAN ASSISTANT

## 2022-04-16 PROCEDURE — 83050 HGB METHEMOGLOBIN QUAN: CPT

## 2022-04-16 PROCEDURE — 96376 TX/PRO/DX INJ SAME DRUG ADON: CPT

## 2022-04-16 PROCEDURE — 82962 GLUCOSE BLOOD TEST: CPT

## 2022-04-16 PROCEDURE — 81003 URINALYSIS AUTO W/O SCOPE: CPT | Performed by: EMERGENCY MEDICINE

## 2022-04-16 PROCEDURE — 82805 BLOOD GASES W/O2 SATURATION: CPT

## 2022-04-16 PROCEDURE — 99283 EMERGENCY DEPT VISIT LOW MDM: CPT

## 2022-04-16 PROCEDURE — 83605 ASSAY OF LACTIC ACID: CPT | Performed by: PHYSICIAN ASSISTANT

## 2022-04-16 PROCEDURE — 96361 HYDRATE IV INFUSION ADD-ON: CPT

## 2022-04-16 PROCEDURE — 36600 WITHDRAWAL OF ARTERIAL BLOOD: CPT

## 2022-04-16 PROCEDURE — 36415 COLL VENOUS BLD VENIPUNCTURE: CPT

## 2022-04-16 PROCEDURE — 74177 CT ABD & PELVIS W/CONTRAST: CPT

## 2022-04-16 PROCEDURE — 83690 ASSAY OF LIPASE: CPT | Performed by: EMERGENCY MEDICINE

## 2022-04-16 PROCEDURE — 82375 ASSAY CARBOXYHB QUANT: CPT

## 2022-04-16 PROCEDURE — 85025 COMPLETE CBC W/AUTO DIFF WBC: CPT

## 2022-04-16 PROCEDURE — 96375 TX/PRO/DX INJ NEW DRUG ADDON: CPT

## 2022-04-16 PROCEDURE — 82010 KETONE BODYS QUAN: CPT | Performed by: PHYSICIAN ASSISTANT

## 2022-04-16 PROCEDURE — 25010000002 ONDANSETRON PER 1 MG: Performed by: PHYSICIAN ASSISTANT

## 2022-04-16 PROCEDURE — 80053 COMPREHEN METABOLIC PANEL: CPT | Performed by: EMERGENCY MEDICINE

## 2022-04-16 PROCEDURE — 96365 THER/PROPH/DIAG IV INF INIT: CPT

## 2022-04-16 PROCEDURE — 25010000002 ONDANSETRON PER 1 MG: Performed by: EMERGENCY MEDICINE

## 2022-04-16 RX ORDER — ONDANSETRON 2 MG/ML
4 INJECTION INTRAMUSCULAR; INTRAVENOUS ONCE
Status: COMPLETED | OUTPATIENT
Start: 2022-04-16 | End: 2022-04-16

## 2022-04-16 RX ORDER — PROMETHAZINE HYDROCHLORIDE 25 MG/1
25 TABLET ORAL EVERY 6 HOURS PRN
Qty: 12 TABLET | Refills: 0 | OUTPATIENT
Start: 2022-04-16 | End: 2022-05-20

## 2022-04-16 RX ORDER — SODIUM CHLORIDE 9 MG/ML
10 INJECTION INTRAVENOUS AS NEEDED
Status: DISCONTINUED | OUTPATIENT
Start: 2022-04-16 | End: 2022-04-16 | Stop reason: HOSPADM

## 2022-04-16 RX ORDER — PROMETHAZINE HYDROCHLORIDE 25 MG/1
25 SUPPOSITORY RECTAL EVERY 6 HOURS PRN
Qty: 10 SUPPOSITORY | Refills: 0 | Status: SHIPPED | OUTPATIENT
Start: 2022-04-16 | End: 2022-07-28

## 2022-04-16 RX ORDER — POTASSIUM CHLORIDE 7.45 MG/ML
10 INJECTION INTRAVENOUS ONCE
Status: COMPLETED | OUTPATIENT
Start: 2022-04-16 | End: 2022-04-16

## 2022-04-16 RX ADMIN — IOPAMIDOL 85 ML: 612 INJECTION, SOLUTION INTRAVENOUS at 16:18

## 2022-04-16 RX ADMIN — POTASSIUM CHLORIDE 10 MEQ: 7.46 INJECTION, SOLUTION INTRAVENOUS at 15:39

## 2022-04-16 RX ADMIN — ONDANSETRON 4 MG: 2 INJECTION INTRAMUSCULAR; INTRAVENOUS at 19:35

## 2022-04-16 RX ADMIN — SODIUM CHLORIDE 2000 ML: 9 INJECTION, SOLUTION INTRAVENOUS at 15:30

## 2022-04-16 RX ADMIN — ONDANSETRON 4 MG: 2 INJECTION INTRAMUSCULAR; INTRAVENOUS at 15:33

## 2022-04-16 NOTE — DISCHARGE INSTRUCTIONS
Clear liquids rest of today.  Advance diet as tolerated in the morning.  Phenergan as prescribed for nausea.  Follow up with your PCP on Monday.  Return to the ER if worse.

## 2022-04-16 NOTE — ED PROVIDER NOTES
Subjective   Pt presents with nausea and vomiting that began yesterday.  No abd pain, diarrhea, urinary complaints, fever, cough, sore throat, rhinorrhea or chest pain.  Has DM, sugars running 300s.  No recent abx, travel, camping or sick contacts.  Hasn't tried anything for symptoms, can't hold anything down.      History provided by:  Patient      Review of Systems   Constitutional: Negative for fever.   HENT: Negative for sore throat.    Respiratory: Negative for cough and shortness of breath.    Cardiovascular: Negative for chest pain.   Gastrointestinal: Positive for nausea and vomiting. Negative for abdominal pain and diarrhea.   Genitourinary: Negative for difficulty urinating and dysuria.   All other systems reviewed and are negative.      Past Medical History:   Diagnosis Date   • Diabetes mellitus (HCC)     PT DENIES THIS DX, PREVIOUS DOCUMENTED   • GI (gastrointestinal bleed)        No Known Allergies    History reviewed. No pertinent surgical history.    Family History   Problem Relation Age of Onset   • Diabetes Mother    • No Known Problems Father    • Colon cancer Neg Hx    • Esophageal cancer Neg Hx    • Inflammatory bowel disease Neg Hx    • Irritable bowel syndrome Neg Hx    • Ulcerative colitis Neg Hx        Social History     Socioeconomic History   • Marital status: Single   Tobacco Use   • Smoking status: Former Smoker   • Smokeless tobacco: Never Used   Vaping Use   • Vaping Use: Never used   Substance and Sexual Activity   • Alcohol use: Yes     Comment: SOCIALLY   • Drug use: No   • Sexual activity: Yes     Partners: Female           Objective   Physical Exam  Vitals and nursing note reviewed.   Constitutional:       General: He is not in acute distress.     Appearance: Normal appearance. He is not ill-appearing.   HENT:      Head: Normocephalic and atraumatic.      Mouth/Throat:      Mouth: Mucous membranes are moist.   Eyes:      General: No scleral icterus.        Right eye: No discharge.          Left eye: No discharge.      Conjunctiva/sclera: Conjunctivae normal.   Cardiovascular:      Rate and Rhythm: Regular rhythm. Tachycardia present.      Heart sounds: No murmur heard.  Pulmonary:      Effort: Pulmonary effort is normal. No respiratory distress.      Breath sounds: Normal breath sounds. No wheezing.   Abdominal:      General: Bowel sounds are normal. There is no distension.      Palpations: Abdomen is soft.      Tenderness: There is no abdominal tenderness. There is no guarding or rebound.   Musculoskeletal:         General: No swelling. Normal range of motion.      Cervical back: Normal range of motion and neck supple.   Skin:     General: Skin is warm and dry.      Findings: No rash.   Neurological:      General: No focal deficit present.      Mental Status: He is alert. Mental status is at baseline.   Psychiatric:         Mood and Affect: Mood normal.         Behavior: Behavior normal.         Thought Content: Thought content normal.         Procedures           ED Course         Sugar 250s.  Insulin and IV saline given.  Some ketones in blood/urine but he has CO2 of 27 and gap is only 16.    2L saline given with antiemetics.  He feels much better.  HR has normalized.    Patient stable on serial rechecks.  Discussed findings, concerns, plan of care, expected course, reasons to return and followup.  Provided the opportunity to ask questions.  Recommend close surveillance.  Antiemetics so he can push fluids at home.                                        MDM  Number of Diagnoses or Management Options     Amount and/or Complexity of Data Reviewed  Clinical lab tests: ordered and reviewed        Final diagnoses:   Dehydration   Nausea and vomiting, unspecified vomiting type   Type 2 diabetes mellitus with hyperglycemia, with long-term current use of insulin (HCC)       ED Disposition  ED Disposition     ED Disposition   Discharge    Condition   Stable    Comment   --             Khari Bonilla  IKE Dinh  [address] [facility] [address]  [phone]    In 3 days      [facility] Emergency Department  [address]  [facility] [address] [phone]    If symptoms worsen         Medication List      New Prescriptions    ondansetron ODT 4 MG disintegrating tablet  Commonly known as: ZOFRAN-ODT  Place 1 tablet on the tongue Every 6 (Six) Hours As Needed for Nausea or Vomiting.        Changed    * promethazine 25 MG tablet  Commonly known as: PHENERGAN  Take 1 tablet by mouth Every 6 (Six) Hours As Needed for Nausea or Vomiting.  What changed: Another medication with the same name was added. Make sure you understand how and when to take each.     * promethazine 25 MG suppository  Commonly known as: PHENERGAN  Insert 1 suppository into the rectum Every 6 (Six) Hours As Needed for Nausea or Vomiting.  What changed: You were already taking a medication with the same name, and this prescription was added. Make sure you understand how and when to take each.         * This list has 2 medication(s) that are the same as other medications prescribed for you. Read the directions carefully, and ask your doctor or other care provider to review them with you.               Where to Get Your Medications      These medications were sent to [facility]/pharmacy #[id number] - [place], [place] - [address]  - [phone]  - [phone]   [address] [facility] [address]    Hours: 24-hours Phone: [phone]   · ondansetron ODT 4 MG disintegrating tablet  · promethazine 25 MG suppository          Ugo Croft MD  04/15/22 7558

## 2022-04-16 NOTE — ED PROVIDER NOTES
Subjective   Mr. Woods is a 27-year-old male with a history of insulin-dependent diabetes type 2 who presents to the emergency department with complaints of intractable nausea and vomiting that began 2 days ago.  The patient was seen here in our emergency department yesterday with the same complaints and was given fluids, antiemetics and a small dose of insulin and was ultimately discharged home on Zofran.  The patient states that his nausea and vomiting returned and has had no relief with Zofran.  He states that he has no abdominal pain but does have pickups from time to time.  No diarrhea.  Normal urination.  The patient's mother states that this is the fourth ER visit and about a month with the same symptoms.  She states that he has not had any scans of his abdomen and feels that we should do a CT today.  The patient denies any fever or chills.  He denies any sick exposures.  No suspect food or water.  No fever or chills.  No other known health issues.          Review of Systems   Constitutional: Positive for appetite change and fatigue. Negative for chills and fever.   HENT: Negative for sore throat.    Respiratory: Negative for cough and shortness of breath.    Cardiovascular: Negative for chest pain.   Gastrointestinal: Positive for nausea and vomiting. Negative for abdominal pain, blood in stool, constipation and diarrhea.   Endocrine: Negative for polydipsia, polyphagia and polyuria.   Genitourinary: Negative for decreased urine volume and dysuria.   Musculoskeletal: Negative for back pain.   Skin: Negative for pallor and rash.   Allergic/Immunologic: Negative for immunocompromised state.   Neurological: Positive for weakness (Generalized) and light-headedness. Negative for headaches.   Hematological: Negative.    Psychiatric/Behavioral: Negative.        Past Medical History:   Diagnosis Date   • Diabetes mellitus (HCC)     PT DENIES THIS DX, PREVIOUS DOCUMENTED   • GI (gastrointestinal bleed)        No  Known Allergies    History reviewed. No pertinent surgical history.    Family History   Problem Relation Age of Onset   • Diabetes Mother    • No Known Problems Father    • Colon cancer Neg Hx    • Esophageal cancer Neg Hx    • Inflammatory bowel disease Neg Hx    • Irritable bowel syndrome Neg Hx    • Ulcerative colitis Neg Hx        Social History     Socioeconomic History   • Marital status: Single   Tobacco Use   • Smoking status: Former Smoker   • Smokeless tobacco: Never Used   Vaping Use   • Vaping Use: Never used   Substance and Sexual Activity   • Alcohol use: Yes     Comment: SOCIALLY   • Drug use: No   • Sexual activity: Yes     Partners: Female           Objective   Physical Exam  Constitutional:       General: He is not in acute distress.     Appearance: Normal appearance. He is not toxic-appearing.   HENT:      Head: Normocephalic.      Nose: Nose normal.      Mouth/Throat:      Mouth: Mucous membranes are moist.   Eyes:      General: No scleral icterus.     Conjunctiva/sclera: Conjunctivae normal.      Pupils: Pupils are equal, round, and reactive to light.   Cardiovascular:      Rate and Rhythm: Regular rhythm. Tachycardia present.      Pulses: Normal pulses.      Comments: Tachycardic in the 130s  Pulmonary:      Effort: Pulmonary effort is normal.   Abdominal:      General: Bowel sounds are normal.      Tenderness: There is no abdominal tenderness. There is no guarding or rebound.   Musculoskeletal:         General: No tenderness. Normal range of motion.      Cervical back: Normal range of motion and neck supple.      Right lower leg: No edema.      Left lower leg: No edema.   Skin:     General: Skin is warm and dry.      Coloration: Skin is not pale.      Findings: No rash.   Neurological:      General: No focal deficit present.      Mental Status: He is alert and oriented to person, place, and time.   Psychiatric:         Mood and Affect: Mood normal.         Procedures       Differential  diagnosis includes viral gastroenteritis, diabetic ketoacidosis, gallbladder disease, etc.    ED Course      Labs were obtained.  The patient was started on IV fluids 2 L over an hour.  He was given Zofran IV.  A CT of the abdomen pelvis has been ordered to evaluate for gallbladder disease or other acute abnormality.    Initial gucose was 228.  Potassium was down at bit at 3.3.  Pt was given 10 meq KCl IV.  Beta-Hydroxybutyrate was 0.827.  ABG shows pH of 7.503.      Repeat glucose is down to 155.  No vomiting in the ED.      CT abd/pelvis:  1.  No acute process seen within the abdomen or pelvis.  The appendix is normal.    18:29 EDT  I rechecked the pt.  He is feeling much better after fluids and Zofran.  He states Zofran ODT does not seem to help at home.  I will give rx for Phenergan to take at home and have him follow up with his PCP on Monday.                                             MDM    Final diagnoses:   Nausea and vomiting, unspecified vomiting type   Type 2 diabetes mellitus with hyperglycemia, with long-term current use of insulin ([facility])       ED Disposition  ED Disposition     ED Disposition   Discharge    Condition   Stable    Comment   --             Khari Bonilla, APRN  [address] [facility] [address]  [phone]      follow up on Monday    [facility] Emergency Department  [address] [facility] [address] [phone]    If symptoms worsen         Where to Get Your Medications      These medications were sent to [id number] - [place], [place] - [address] [place] [address]  AT [facility] - [phone]  - [phone]   [address] [facility] [address], [facility] [address]    Phone: [phone]   · promethazine 25 MG suppository  · promethazine 25 MG tablet        Medication List      No changes were made to your prescriptions during this visit.          Alan Pappas, PA  04/16/22 2853

## 2022-04-19 ENCOUNTER — OFFICE VISIT (OUTPATIENT)
Dept: INTERNAL MEDICINE | Facility: CLINIC | Age: 27
End: 2022-04-19

## 2022-04-19 ENCOUNTER — LAB (OUTPATIENT)
Dept: LAB | Facility: HOSPITAL | Age: 27
End: 2022-04-19

## 2022-04-19 VITALS
HEIGHT: 71 IN | TEMPERATURE: 98.3 F | WEIGHT: 143 LBS | BODY MASS INDEX: 20.02 KG/M2 | RESPIRATION RATE: 16 BRPM | DIASTOLIC BLOOD PRESSURE: 84 MMHG | SYSTOLIC BLOOD PRESSURE: 128 MMHG | OXYGEN SATURATION: 99 % | HEART RATE: 108 BPM

## 2022-04-19 DIAGNOSIS — R11.0 NAUSEA: ICD-10-CM

## 2022-04-19 DIAGNOSIS — Z20.822 ENCOUNTER FOR PREPROCEDURE SCREENING LABORATORY TESTING FOR COVID-19: Primary | ICD-10-CM

## 2022-04-19 DIAGNOSIS — K21.9 GASTROESOPHAGEAL REFLUX DISEASE, UNSPECIFIED WHETHER ESOPHAGITIS PRESENT: ICD-10-CM

## 2022-04-19 DIAGNOSIS — E11.65 TYPE 2 DIABETES MELLITUS WITH HYPERGLYCEMIA, WITH LONG-TERM CURRENT USE OF INSULIN: Primary | ICD-10-CM

## 2022-04-19 DIAGNOSIS — E11.65 TYPE 2 DIABETES MELLITUS WITH HYPERGLYCEMIA, WITH LONG-TERM CURRENT USE OF INSULIN: ICD-10-CM

## 2022-04-19 DIAGNOSIS — Z76.89 ENCOUNTER TO ESTABLISH CARE: ICD-10-CM

## 2022-04-19 DIAGNOSIS — Z79.4 TYPE 2 DIABETES MELLITUS WITH HYPERGLYCEMIA, WITH LONG-TERM CURRENT USE OF INSULIN: Primary | ICD-10-CM

## 2022-04-19 DIAGNOSIS — Z79.4 TYPE 2 DIABETES MELLITUS WITH HYPERGLYCEMIA, WITH LONG-TERM CURRENT USE OF INSULIN: ICD-10-CM

## 2022-04-19 DIAGNOSIS — Z01.812 ENCOUNTER FOR PREPROCEDURE SCREENING LABORATORY TESTING FOR COVID-19: Primary | ICD-10-CM

## 2022-04-19 LAB
BASOPHILS # BLD AUTO: 0.02 10*3/MM3 (ref 0–0.2)
BASOPHILS NFR BLD AUTO: 0.3 % (ref 0–1.5)
CHOLEST SERPL-MCNC: 198 MG/DL (ref 0–200)
DEPRECATED RDW RBC AUTO: 45.4 FL (ref 37–54)
EOSINOPHIL # BLD AUTO: 0 10*3/MM3 (ref 0–0.4)
EOSINOPHIL NFR BLD AUTO: 0 % (ref 0.3–6.2)
ERYTHROCYTE [DISTWIDTH] IN BLOOD BY AUTOMATED COUNT: 14.3 % (ref 12.3–15.4)
GLUCOSE BLDC GLUCOMTR-MCNC: 115 MG/DL (ref 70–130)
HCT VFR BLD AUTO: 45.9 % (ref 37.5–51)
HCV AB SER DONR QL: NORMAL
HDLC SERPL-MCNC: 43 MG/DL (ref 40–60)
HGB BLD-MCNC: 15.7 G/DL (ref 13–17.7)
IMM GRANULOCYTES # BLD AUTO: 0.02 10*3/MM3 (ref 0–0.05)
IMM GRANULOCYTES NFR BLD AUTO: 0.3 % (ref 0–0.5)
LDLC SERPL CALC-MCNC: 133 MG/DL (ref 0–100)
LDLC/HDLC SERPL: 3.04 {RATIO}
LYMPHOCYTES # BLD AUTO: 1.56 10*3/MM3 (ref 0.7–3.1)
LYMPHOCYTES NFR BLD AUTO: 22 % (ref 19.6–45.3)
MCH RBC QN AUTO: 29.9 PG (ref 26.6–33)
MCHC RBC AUTO-ENTMCNC: 34.2 G/DL (ref 31.5–35.7)
MCV RBC AUTO: 87.4 FL (ref 79–97)
MONOCYTES # BLD AUTO: 0.64 10*3/MM3 (ref 0.1–0.9)
MONOCYTES NFR BLD AUTO: 9 % (ref 5–12)
NEUTROPHILS NFR BLD AUTO: 4.86 10*3/MM3 (ref 1.7–7)
NEUTROPHILS NFR BLD AUTO: 68.4 % (ref 42.7–76)
NRBC BLD AUTO-RTO: 0 /100 WBC (ref 0–0.2)
PLATELET # BLD AUTO: 351 10*3/MM3 (ref 140–450)
PMV BLD AUTO: 10.4 FL (ref 6–12)
RBC # BLD AUTO: 5.25 10*6/MM3 (ref 4.14–5.8)
TRIGL SERPL-MCNC: 122 MG/DL (ref 0–150)
TSH SERPL DL<=0.05 MIU/L-ACNC: 1.21 UIU/ML (ref 0.27–4.2)
VLDLC SERPL-MCNC: 22 MG/DL (ref 5–40)
WBC NRBC COR # BLD: 7.1 10*3/MM3 (ref 3.4–10.8)

## 2022-04-19 PROCEDURE — 80053 COMPREHEN METABOLIC PANEL: CPT

## 2022-04-19 PROCEDURE — 82962 GLUCOSE BLOOD TEST: CPT

## 2022-04-19 PROCEDURE — 86803 HEPATITIS C AB TEST: CPT

## 2022-04-19 PROCEDURE — 84443 ASSAY THYROID STIM HORMONE: CPT

## 2022-04-19 PROCEDURE — 85025 COMPLETE CBC W/AUTO DIFF WBC: CPT

## 2022-04-19 PROCEDURE — 80061 LIPID PANEL: CPT

## 2022-04-19 PROCEDURE — 99215 OFFICE O/P EST HI 40 MIN: CPT

## 2022-04-19 RX ORDER — OMEPRAZOLE 20 MG/1
20 CAPSULE, DELAYED RELEASE ORAL DAILY
Qty: 90 CAPSULE | Refills: 0 | Status: SHIPPED | OUTPATIENT
Start: 2022-04-19 | End: 2022-07-28 | Stop reason: SDUPTHER

## 2022-04-19 RX ORDER — METFORMIN HYDROCHLORIDE EXTENDED-RELEASE TABLETS 1000 MG/1
1000 TABLET, FILM COATED, EXTENDED RELEASE ORAL 2 TIMES DAILY WITH MEALS
Qty: 180 TABLET | Refills: 0 | Status: SHIPPED | OUTPATIENT
Start: 2022-04-19 | End: 2022-04-26

## 2022-04-19 NOTE — PROGRESS NOTES
Chief Complaint  Hospital Follow Up Visit    Ford Mukherjee Jr. presents to [facility]      HPI: Presented to the emergency department on 4/15 with complaints of intractable nausea and vomiting that began 2 days ago.  The patient was seen in the emergency department the day prior with the same complaints and was given fluids, antiemetics and a small dose of insulin and was ultimately discharged home on Zofran. Denied abdominal pain. No diarrhea.  Normal urination. Had CT abd/pelvis which was negative. No fever or chills, sick exposures, or suspect food or water. Has been drinking Pedialyte since being home and states the promethazine is not providing much relief in nausea. Has been injecting 10 units of levemir and metformin each day with no omitted doses and no side effects reported. Has been monitoring BG at home with FPG in the 200's. States water intake has been poor. Was on omeprazole which helped nausea and reflux symptoms intially and then he quit taking the medication because he felt he no longer needed it. No episodes of hypoglycemia. States BG was in the 300's last night due to drinking pedialyte and he injected 25 units of levemir. Has appointment with Dr. Orozco with endocrinology in June.     Subjective         Health Maintenance   Topic   • URINE MICROALBUMIN    • ANNUAL PHYSICAL    • HEPATITIS C SCREENING    • DIABETIC FOOT EXAM    • DIABETIC EYE EXAM    • COVID-19 Vaccine (3 - Booster for Pfizer series)   • TDAP/TD VACCINES (1 - Tdap)   • Hepatitis B (3 of 3 - Risk 3-dose series)   • Pneumococcal Vaccine 0-64 (1 - PCV)   • INFLUENZA VACCINE    • HEMOGLOBIN A1C        [facility]  The following portions of the patient's history were reviewed and updated as appropriate: allergies, current medications, past family history, past medical history, past social history, past surgical history and problem list.     Past Medical History:   Diagnosis Date   • Diabetes mellitus  (HCC)     PT DENIES THIS DX, PREVIOUS DOCUMENTED   • GI (gastrointestinal bleed)       No Known Allergies   Social History     Tobacco Use   • Smoking status: Former Smoker   • Smokeless tobacco: Never Used   Vaping Use   • Vaping Use: Never used   Substance Use Topics   • Alcohol use: Yes     Comment: SOCIALLY   • Drug use: No     History reviewed. No pertinent surgical history.   Family History   Problem Relation Age of Onset   • Diabetes Mother    • No Known Problems Father    • Colon cancer Neg Hx    • Esophageal cancer Neg Hx    • Inflammatory bowel disease Neg Hx    • Irritable bowel syndrome Neg Hx    • Ulcerative colitis Neg Hx          Current Outpatient Medications:   •  Blood Glucose Monitoring Suppl w/Device kit, Check BG one time per day., Disp: 1 each, Rfl: 0  •  glucose blood test strip, Check BG one time per day., Disp: 100 each, Rfl: 0  •  insulin detemir (LEVEMIR) 100 UNIT/ML injection, Inject 14 Units under the skin into the appropriate area as directed Every Night., Disp: 1 pen, Rfl: 2  •  Insulin Pen Needle (B-D UF III MINI PEN NEEDLES) 31G X 5 MM misc, 1 pen Every Night., Disp: 30 each, Rfl: 0  •  Lancets (freestyle) lancets, Check BG once per day., Disp: 100 each, Rfl: 0  •  omeprazole (priLOSEC) 20 MG capsule, Take 1 capsule by mouth Daily., Disp: 90 capsule, Rfl: 0  •  metFORMIN (FORTAMET) 1000 MG (OSM) 24 hr tablet, Take 1 tablet by mouth 2 (Two) Times a Day With Meals., Disp: 180 tablet, Rfl: 0  •  ondansetron ODT (ZOFRAN-ODT) 4 MG disintegrating tablet, Place 1 tablet on the tongue Every 6 (Six) Hours As Needed for Nausea or Vomiting., Disp: 15 tablet, Rfl: 0  •  promethazine (PHENERGAN) 25 MG suppository, Insert 1 suppository into the rectum Every 6 (Six) Hours As Needed for Nausea or Vomiting., Disp: 10 suppository, Rfl: 0  •  promethazine (PHENERGAN) 25 MG tablet, Take 1 tablet by mouth Every 6 (Six) Hours As Needed for Nausea or Vomiting., Disp: 12 tablet, Rfl: 0    Review of Systems  "  Constitutional: Negative for activity change, appetite change, fatigue and fever.   Respiratory: Negative for apnea, cough, choking, chest tightness, shortness of breath, wheezing and stridor.    Cardiovascular: Negative for chest pain, palpitations and leg swelling.   Gastrointestinal: Positive for nausea, GERD and indigestion. Negative for abdominal distention, abdominal pain, anal bleeding, blood in stool, constipation, diarrhea, rectal pain and vomiting.   Endocrine: Negative for polydipsia, polyphagia and polyuria.   Allergic/Immunologic: Negative for environmental allergies and food allergies.       Objective   Vital Signs  /84   Pulse 108   Temp 98.3 °F (36.8 °C)   Resp 16   Ht 180.3 cm (71\")   Wt 64.9 kg (143 lb)   SpO2 99%   BMI 19.94 kg/m²     Physical Exam  Constitutional:       Appearance: Normal appearance.   HENT:      Head: Normocephalic.      Mouth/Throat:      Mouth: Mucous membranes are dry.      Pharynx: Oropharynx is clear.   Eyes:      Conjunctiva/sclera: Conjunctivae normal.      Pupils: Pupils are equal, round, and reactive to light.   Cardiovascular:      Rate and Rhythm: Regular rhythm. Tachycardia present.      Pulses: Normal pulses.      Heart sounds: Normal heart sounds.   Pulmonary:      Effort: Pulmonary effort is normal.      Breath sounds: Normal breath sounds.   Abdominal:      General: Bowel sounds are normal.      Palpations: Abdomen is soft.   Skin:     General: Skin is warm and dry.      Capillary Refill: Capillary refill takes less than 2 seconds.   Neurological:      Mental Status: He is alert and oriented to person, place, and time.   Psychiatric:         Mood and Affect: Mood normal.         Behavior: Behavior normal.         Thought Content: Thought content normal.         Judgment: Judgment normal.          Result Review :     The following data was reviewed by: IKE Hanks on 04/19/2022:  CMP    CMP 3/26/22 3/26/22 4/15/22 4/15/22 4/16/22    0938 0959 " 1900 1929    Glucose 353 (A)  271 (A)  228 (A)   BUN 23 (A)  17  14   Creatinine 0.74 (A) 0.60 0.73 (A) 0.50 (A) 0.67 (A)   Sodium 137  136  139   Potassium 3.5  3.6  3.3 (A)   Chloride 90 (A)  93 (A)  97 (A)   Calcium 10.6 (A)  10.8 (A)  10.2   Albumin 5.60 (A)  5.70 (A)  5.40 (A)   Total Bilirubin 1.2  0.8  0.9   Alkaline Phosphatase 80  83  76   AST (SGOT) 18  15  14   ALT (SGPT) 68 (A)  45 (A)  38   (A) Abnormal value       Comments are available for some flowsheets but are not being displayed.             Assessment and Plan    1. Type 2 diabetes mellitus with hyperglycemia, with long-term current use of insulin (HCC)  - POCT Glucose  - metFORMIN (FORTAMET) 1000 MG (OSM) 24 hr tablet; Take 1 tablet by mouth 2 (Two) Times a Day With Meals.  Dispense: 180 tablet; Refill: 0  - insulin detemir (LEVEMIR) 100 UNIT/ML injection; Inject 14 Units under the skin into the appropriate area as directed Every Night.  Dispense: 1 pen; Refill: 2  - Comprehensive Metabolic Panel; Future  - CBC & Differential; Future  - Urinalysis With Culture If Indicated -; Future  - BG okay in office today at 115. Discussed need for medication compliance as many of his symptoms are likely r/t hyperglycemia and uncontrolled DM. Will increase levemir to 14 units. Encouraged to increase levemir dosing by 2 units every 3 days if FPG is remaining over 130. Will follow-up in around 9 days to evaluate for improvement. Will switch metformin to XR to hopefully help alleviate GI symptoms. Discussed FBGs should be between  and post-prandial blood glucoses should be less than 180 to ensure that A1C is less than 7%. Consume a diet low in saturated fat (13 g max), refined carbohydrates, and fructose corn syrup. Consume a diet high in fiber (30g/day), lean protein, and fresh produce intake. Discussed if hypoglycemia occurs ingest 15-20 gm of fast-acting carbohydrates (ex. 3 glucose tablets, 4 oz or 120 mL of fruit juice or regular soda, 6 or 7 hard  candies, or 1 tablespoon of sugar) and recheck BG in 15 minutes. Follow up with a complex carbohydrate once BG is stable (over 70). - Encouraged to perform foot inspection and foot care daily.    Goals for you:   • A1C less than 7%  • FPG should be   • PP should be less than 180  • BP should be less than 130/80  • LDL less than 100  • Albumin/creatinine ratio less than 30 mg/g  • Abstain from smoking and alcohol use  • Annual dilated eye exam  • Annual diabetic foot exam     Education performed during this visit includes long term diabetic complications and routine health maintenance including annual eye exams with ophthalmology, appropriate dental hygiene, and foot care. Medications, including side effects and compliance discussed carefully and Hypoglycemia management and prevention reviewed.   Discussed the nature of the disease including, risks, complications, implications, management, safe and proper use of medications. Encouraged therapeutic lifestyle changes including low calorie diet with plenty of fruits and vegetables, daily exercise, medication compliance, and keeping scheduled follow up appointments as indicated. Encouraged patient to have appointment for complete physical, fasting labs, appropriate screenings, and immunizations on an annual basis.    2. Gastroesophageal reflux disease, unspecified whether esophagitis present  - omeprazole (priLOSEC) 20 MG capsule; Take 1 capsule by mouth Daily.  Dispense: 90 capsule; Refill: 0  - Ambulatory Referral to Gastroenterology  - Will reinitiate omeprazole as symptoms were controlled on this medication and will refer to GI for further evaluation.     3. Nausea  - omeprazole (priLOSEC) 20 MG capsule; Take 1 capsule by mouth Daily.  Dispense: 90 capsule; Refill: 0  - Advised in oral rehydration therapy with water and electrolyte replacement. Encouraged bland diet and avoidance of offending foods. Patient has plenty of prn nausea medications to use from  recent ED visits.   - Encouraged to seek further follow-up in office of ER treatment after hours for worsening symptoms and/or inability to keep food/fluids down for 24 hours.   - Follow-up in 2 days if no improvement or worsening.    I spent 40 minutes caring for Ford on this date of service. This time includes time spent by me in the following activities:preparing for the visit, reviewing tests, obtaining and/or reviewing a separately obtained history, performing a medically appropriate examination and/or evaluation , counseling and educating the patient/family/caregiver, ordering medications, tests, or procedures, referring and communicating with other health care professionals , documenting information in the medical record and independently interpreting results and communicating that information with the patient/family/caregiver    Follow Up     Return for Next scheduled follow up.    Patient was given instructions and counseling regarding his condition or for health maintenance advice. Please see specific information pulled into the AVS if appropriate.    Part of this note may be an electronic transcription/translation of spoken language to printed text using the Dragon Dictation System.    Electronically signed by:   IKE Hanks  04/19/2022

## 2022-04-20 LAB
ALBUMIN SERPL-MCNC: 5 G/DL (ref 3.5–5.2)
ALBUMIN/GLOB SERPL: 1.9 G/DL
ALP SERPL-CCNC: 74 U/L (ref 39–117)
ALT SERPL W P-5'-P-CCNC: 36 U/L (ref 1–41)
ANION GAP SERPL CALCULATED.3IONS-SCNC: 16.2 MMOL/L (ref 5–15)
AST SERPL-CCNC: 17 U/L (ref 1–40)
BILIRUB SERPL-MCNC: 0.9 MG/DL (ref 0–1.2)
BUN SERPL-MCNC: 15 MG/DL (ref 6–20)
BUN/CREAT SERPL: 20.8 (ref 7–25)
CALCIUM SPEC-SCNC: 10.2 MG/DL (ref 8.6–10.5)
CHLORIDE SERPL-SCNC: 91 MMOL/L (ref 98–107)
CO2 SERPL-SCNC: 27.8 MMOL/L (ref 22–29)
CREAT SERPL-MCNC: 0.72 MG/DL (ref 0.76–1.27)
EGFRCR SERPLBLD CKD-EPI 2021: 129.2 ML/MIN/1.73
GLOBULIN UR ELPH-MCNC: 2.6 GM/DL
GLUCOSE SERPL-MCNC: 120 MG/DL (ref 65–99)
POTASSIUM SERPL-SCNC: 3.4 MMOL/L (ref 3.5–5.2)
PROT SERPL-MCNC: 7.6 G/DL (ref 6–8.5)
SODIUM SERPL-SCNC: 135 MMOL/L (ref 136–145)

## 2022-04-22 ENCOUNTER — CLINICAL SUPPORT NO REQUIREMENTS (OUTPATIENT)
Dept: PREADMISSION TESTING | Facility: HOSPITAL | Age: 27
End: 2022-04-22

## 2022-04-22 ENCOUNTER — TELEPHONE (OUTPATIENT)
Dept: INTERNAL MEDICINE | Facility: CLINIC | Age: 27
End: 2022-04-22

## 2022-04-22 DIAGNOSIS — Z20.822 ENCOUNTER FOR PREPROCEDURE SCREENING LABORATORY TESTING FOR COVID-19: ICD-10-CM

## 2022-04-22 DIAGNOSIS — Z01.812 ENCOUNTER FOR PREPROCEDURE SCREENING LABORATORY TESTING FOR COVID-19: ICD-10-CM

## 2022-04-22 LAB — SARS-COV-2 RNA PNL SPEC NAA+PROBE: NOT DETECTED

## 2022-04-22 PROCEDURE — C9803 HOPD COVID-19 SPEC COLLECT: HCPCS

## 2022-04-22 PROCEDURE — U0004 COV-19 TEST NON-CDC HGH THRU: HCPCS

## 2022-04-22 NOTE — TELEPHONE ENCOUNTER
Pt states needs work note stating can return to work on 5/1/22 and recommend a shift change due to medical dx

## 2022-04-22 NOTE — TELEPHONE ENCOUNTER
Hub staff attempted to follow warm transfer process and was unsuccessful     Caller: Ford Mukherjee Jr.    Relationship to patient: Self    Best call back number: [phone]     Patient is needing: THE PATIENT ATTEMPTED TO RETURN TOMY'S CALL. UNABLE TO WARM TRANSFER TO THE PRACTICE. PLEASE CALL THE  PATIENT -[phone].

## 2022-04-22 NOTE — TELEPHONE ENCOUNTER
Hub staff attempted to follow warm transfer process and was unsuccessful     Caller: Ford Mukherjee Jr.    Relationship to patient: Self    Best call back number: [phone]    Patient is needing: PATIENT IS CALLING CHRISTIANO BACK.

## 2022-04-22 NOTE — TELEPHONE ENCOUNTER
Caller: Ford Mukherjee Jr.    Relationship: Self    Best call back number: [phone]    What form or medical record are you requesting: LETTER STATING WHEN HE CAN RETURN TO WORK.  ALSO CHANGE TO A 1ST SHIFT JOB DUE TO DIABETES    Who is requesting this form or medical record from you: PATIENT    How would you like to receive the form or medical records (pick-up, mail, fax): PICKUP  If fax, what is the fax number:   If mail, what is the address:   If pick-up, provide patient with address and location details    Timeframe paperwork needed: ASAP    Additional notes: REQUESTING TO RETURN TO WORK MAY 1, 2022

## 2022-04-25 ENCOUNTER — OUTSIDE FACILITY SERVICE (OUTPATIENT)
Dept: GASTROENTEROLOGY | Facility: CLINIC | Age: 27
End: 2022-04-25

## 2022-04-25 PROCEDURE — 43239 EGD BIOPSY SINGLE/MULTIPLE: CPT | Performed by: INTERNAL MEDICINE

## 2022-04-25 NOTE — TELEPHONE ENCOUNTER
Let pt know of message. Pt wants to know if could write to cover what has missed and return to work now

## 2022-04-26 DIAGNOSIS — E11.65 TYPE 2 DIABETES MELLITUS WITH HYPERGLYCEMIA, WITH LONG-TERM CURRENT USE OF INSULIN: ICD-10-CM

## 2022-04-26 DIAGNOSIS — Z79.4 TYPE 2 DIABETES MELLITUS WITH HYPERGLYCEMIA, WITH LONG-TERM CURRENT USE OF INSULIN: ICD-10-CM

## 2022-04-28 ENCOUNTER — OFFICE VISIT (OUTPATIENT)
Dept: INTERNAL MEDICINE | Facility: CLINIC | Age: 27
End: 2022-04-28

## 2022-04-28 ENCOUNTER — LAB (OUTPATIENT)
Dept: LAB | Facility: HOSPITAL | Age: 27
End: 2022-04-28

## 2022-04-28 VITALS
HEIGHT: 70 IN | TEMPERATURE: 97.4 F | WEIGHT: 160 LBS | BODY MASS INDEX: 22.9 KG/M2 | SYSTOLIC BLOOD PRESSURE: 130 MMHG | DIASTOLIC BLOOD PRESSURE: 88 MMHG | OXYGEN SATURATION: 99 % | HEART RATE: 85 BPM

## 2022-04-28 DIAGNOSIS — E11.65 TYPE 2 DIABETES MELLITUS WITH HYPERGLYCEMIA, WITH LONG-TERM CURRENT USE OF INSULIN: Primary | ICD-10-CM

## 2022-04-28 DIAGNOSIS — Z79.4 TYPE 2 DIABETES MELLITUS WITH HYPERGLYCEMIA, WITH LONG-TERM CURRENT USE OF INSULIN: ICD-10-CM

## 2022-04-28 DIAGNOSIS — E11.65 TYPE 2 DIABETES MELLITUS WITH HYPERGLYCEMIA, WITH LONG-TERM CURRENT USE OF INSULIN: ICD-10-CM

## 2022-04-28 DIAGNOSIS — Z79.4 TYPE 2 DIABETES MELLITUS WITH HYPERGLYCEMIA, WITH LONG-TERM CURRENT USE OF INSULIN: Primary | ICD-10-CM

## 2022-04-28 DIAGNOSIS — Z00.00 ANNUAL PHYSICAL EXAM: ICD-10-CM

## 2022-04-28 LAB
ALBUMIN SERPL-MCNC: 4.6 G/DL (ref 3.5–5.2)
ALBUMIN UR-MCNC: <1.2 MG/DL
ALBUMIN/GLOB SERPL: 1.8 G/DL
ALP SERPL-CCNC: 52 U/L (ref 39–117)
ALT SERPL W P-5'-P-CCNC: 56 U/L (ref 1–41)
ANION GAP SERPL CALCULATED.3IONS-SCNC: 11.9 MMOL/L (ref 5–15)
AST SERPL-CCNC: 23 U/L (ref 1–40)
BASOPHILS # BLD AUTO: 0.02 10*3/MM3 (ref 0–0.2)
BASOPHILS NFR BLD AUTO: 0.3 % (ref 0–1.5)
BILIRUB SERPL-MCNC: 0.2 MG/DL (ref 0–1.2)
BILIRUB UR QL STRIP: NEGATIVE
BUN SERPL-MCNC: 10 MG/DL (ref 6–20)
BUN/CREAT SERPL: 16.1 (ref 7–25)
CALCIUM SPEC-SCNC: 9.3 MG/DL (ref 8.6–10.5)
CHLORIDE SERPL-SCNC: 101 MMOL/L (ref 98–107)
CLARITY UR: CLEAR
CO2 SERPL-SCNC: 26.1 MMOL/L (ref 22–29)
COLOR UR: YELLOW
CREAT SERPL-MCNC: 0.62 MG/DL (ref 0.76–1.27)
CREAT UR-MCNC: 92.9 MG/DL
DEPRECATED RDW RBC AUTO: 48.2 FL (ref 37–54)
EGFRCR SERPLBLD CKD-EPI 2021: 135.2 ML/MIN/1.73
EOSINOPHIL # BLD AUTO: 0.06 10*3/MM3 (ref 0–0.4)
EOSINOPHIL NFR BLD AUTO: 0.9 % (ref 0.3–6.2)
ERYTHROCYTE [DISTWIDTH] IN BLOOD BY AUTOMATED COUNT: 14.2 % (ref 12.3–15.4)
GLOBULIN UR ELPH-MCNC: 2.5 GM/DL
GLUCOSE BLDC GLUCOMTR-MCNC: 229 MG/DL (ref 70–130)
GLUCOSE SERPL-MCNC: 197 MG/DL (ref 65–99)
GLUCOSE UR STRIP-MCNC: ABNORMAL MG/DL
HCT VFR BLD AUTO: 39.6 % (ref 37.5–51)
HGB BLD-MCNC: 13.1 G/DL (ref 13–17.7)
HGB UR QL STRIP.AUTO: NEGATIVE
IMM GRANULOCYTES # BLD AUTO: 0.08 10*3/MM3 (ref 0–0.05)
IMM GRANULOCYTES NFR BLD AUTO: 1.2 % (ref 0–0.5)
KETONES UR QL STRIP: NEGATIVE
LEUKOCYTE ESTERASE UR QL STRIP.AUTO: NEGATIVE
LYMPHOCYTES # BLD AUTO: 1.96 10*3/MM3 (ref 0.7–3.1)
LYMPHOCYTES NFR BLD AUTO: 29.6 % (ref 19.6–45.3)
MCH RBC QN AUTO: 30.3 PG (ref 26.6–33)
MCHC RBC AUTO-ENTMCNC: 33.1 G/DL (ref 31.5–35.7)
MCV RBC AUTO: 91.5 FL (ref 79–97)
MICROALBUMIN/CREAT UR: NORMAL MG/G{CREAT}
MONOCYTES # BLD AUTO: 0.5 10*3/MM3 (ref 0.1–0.9)
MONOCYTES NFR BLD AUTO: 7.6 % (ref 5–12)
NEUTROPHILS NFR BLD AUTO: 4 10*3/MM3 (ref 1.7–7)
NEUTROPHILS NFR BLD AUTO: 60.4 % (ref 42.7–76)
NITRITE UR QL STRIP: NEGATIVE
NRBC BLD AUTO-RTO: 0 /100 WBC (ref 0–0.2)
PH UR STRIP.AUTO: 7 [PH] (ref 5–8)
PLATELET # BLD AUTO: 282 10*3/MM3 (ref 140–450)
PMV BLD AUTO: 10.3 FL (ref 6–12)
POTASSIUM SERPL-SCNC: 4.1 MMOL/L (ref 3.5–5.2)
PROT SERPL-MCNC: 7.1 G/DL (ref 6–8.5)
PROT UR QL STRIP: NEGATIVE
RBC # BLD AUTO: 4.33 10*6/MM3 (ref 4.14–5.8)
SODIUM SERPL-SCNC: 139 MMOL/L (ref 136–145)
SP GR UR STRIP: 1.02 (ref 1–1.03)
UROBILINOGEN UR QL STRIP: ABNORMAL
WBC NRBC COR # BLD: 6.62 10*3/MM3 (ref 3.4–10.8)

## 2022-04-28 PROCEDURE — 82947 ASSAY GLUCOSE BLOOD QUANT: CPT

## 2022-04-28 PROCEDURE — 81003 URINALYSIS AUTO W/O SCOPE: CPT

## 2022-04-28 PROCEDURE — 80053 COMPREHEN METABOLIC PANEL: CPT

## 2022-04-28 PROCEDURE — 85025 COMPLETE CBC W/AUTO DIFF WBC: CPT

## 2022-04-28 PROCEDURE — 82570 ASSAY OF URINE CREATININE: CPT

## 2022-04-28 PROCEDURE — 99395 PREV VISIT EST AGE 18-39: CPT

## 2022-04-28 PROCEDURE — 3008F BODY MASS INDEX DOCD: CPT

## 2022-04-28 PROCEDURE — 82043 UR ALBUMIN QUANTITATIVE: CPT

## 2022-04-28 PROCEDURE — 2014F MENTAL STATUS ASSESS: CPT

## 2022-04-28 NOTE — PROGRESS NOTES
"Chief Complaint  Annual Exam and Diabetes    Ford Sae Mukherjee Jr. presents to [facility]    DM II: Taking metformin each day with no omitted doses and no side effects reported. Has been injecting 20 units of detemir. Has been monitoring FPG with readings in the 110-120. Reports that nausea and abdominal pain have resolved. States he has been improving diet and has started going to the gym a few days per week. Currently asymptomatic.    The patient is being seen for a health maintenance evaluation.  The last health maintenance was 1 year(s) ago.    Social History  Ford does not smoke cigarettes.   He drinks no alcohol.  He does not use illicit drugs.    General History  Ford  does not have regular dental visits.  He does not complain of vision problems. Last eye exam was 2022.   Immunizations are not up to date. The patient needs the following immunizations: COVID-19 booster.     Lifestyle  Ford  consumes a in general, an \"unhealthy\" diet.  He exercises daily.    Reproductive Health  Ford  is sexually active. His contraceptive plan is condoms.   He does not have erectile dysfunction.     Screening  Last PSA was N/A.  Last prostate exam was never. Family history of prostate cancer: No.   Last testicular exam was No.   Last colonoscopy was never. Family history of colon cancer: No.     Health Maintenance -   Wearing seatbelt: yes  Smoke detectors in home: yes    Patient denies fever, chills, headache, ear pain, sore throat, shortness of air, cough, wheezing, chest pain, palpitations, abdominal pain, nausea, vomiting, diarrhea, dysuria, blood in stool or urine. Mood is stable. Eating and drinking as usual. No unexplained weight loss or gain.     Subjective       Health Maintenance   Topic   • URINE MICROALBUMIN    • ANNUAL PHYSICAL    • COVID-19 Vaccine (3 - Booster for Pfizer series)   • TDAP/TD VACCINES (1 - Tdap)   • Hepatitis B (3 of 3 - Risk 3-dose series)   • " Pneumococcal Vaccine 0-64 (1 - PCV)   • INFLUENZA VACCINE    • HEMOGLOBIN A1C    • DIABETIC EYE EXAM    • DIABETIC FOOT EXAM    • HEPATITIS C SCREENING      [facility]  The following portions of the patient's history were reviewed and updated as appropriate: allergies, current medications, past family history, past medical history, past social history, past surgical history and problem list.     Past Medical History:   Diagnosis Date   • Diabetes mellitus (HCC)     PT DENIES THIS DX, PREVIOUS DOCUMENTED   • GI (gastrointestinal bleed)       No Known Allergies   Social History     Tobacco Use   • Smoking status: Former Smoker   • Smokeless tobacco: Never Used   Vaping Use   • Vaping Use: Never used   Substance Use Topics   • Alcohol use: Yes     Comment: SOCIALLY   • Drug use: No     History reviewed. No pertinent surgical history.   Family History   Problem Relation Age of Onset   • Diabetes Mother    • No Known Problems Father    • Colon cancer Neg Hx    • Esophageal cancer Neg Hx    • Inflammatory bowel disease Neg Hx    • Irritable bowel syndrome Neg Hx    • Ulcerative colitis Neg Hx          Current Outpatient Medications:   •  Blood Glucose Monitoring Suppl w/Device kit, Check BG one time per day., Disp: 1 each, Rfl: 0  •  glucose blood test strip, Check BG one time per day., Disp: 100 each, Rfl: 0  •  insulin detemir (LEVEMIR) 100 UNIT/ML injection, Inject 20 Units under the skin into the appropriate area as directed Every Night., Disp: 1 pen, Rfl: 5  •  Insulin Pen Needle (B-D UF III MINI PEN NEEDLES) 31G X 5 MM misc, 1 pen Every Night., Disp: 30 each, Rfl: 0  •  Lancets (freestyle) lancets, Check BG once per day., Disp: 100 each, Rfl: 0  •  metFORMIN (GLUCOPHAGE) 1000 MG tablet, Take 1 tablet by mouth 2 (Two) Times a Day With Meals., Disp: 60 tablet, Rfl: 5  •  omeprazole (priLOSEC) 20 MG capsule, Take 1 capsule by mouth Daily., Disp: 90 capsule, Rfl: 0  •  ondansetron ODT (ZOFRAN-ODT) 4 MG disintegrating tablet,  "Place 1 tablet on the tongue Every 6 (Six) Hours As Needed for Nausea or Vomiting., Disp: 15 tablet, Rfl: 0  •  promethazine (PHENERGAN) 25 MG suppository, Insert 1 suppository into the rectum Every 6 (Six) Hours As Needed for Nausea or Vomiting., Disp: 10 suppository, Rfl: 0  •  promethazine (PHENERGAN) 25 MG tablet, Take 1 tablet by mouth Every 6 (Six) Hours As Needed for Nausea or Vomiting., Disp: 12 tablet, Rfl: 0    Review of Systems   Constitutional: Negative for appetite change, chills, diaphoresis, fatigue, fever and unexpected weight loss.   HENT: Negative for nosebleeds, rhinorrhea, sinus pressure, sore throat, swollen glands, trouble swallowing and voice change.    Eyes: Negative for blurred vision, double vision, photophobia and visual disturbance.   Respiratory: Negative for apnea, cough, chest tightness, shortness of breath and wheezing.    Cardiovascular: Negative for chest pain, palpitations and leg swelling.   Gastrointestinal: Negative for abdominal pain, blood in stool, constipation, diarrhea, nausea, vomiting and GERD.   Genitourinary: Negative for decreased urine volume, difficulty urinating, dysuria and flank pain.   Musculoskeletal: Negative for arthralgias and myalgias.   Skin: Negative for color change, rash and skin lesions.   Neurological: Negative for dizziness, syncope, facial asymmetry, speech difficulty, weakness, light-headedness and headache.   Psychiatric/Behavioral: Negative for behavioral problems, dysphoric mood, hallucinations, self-injury, sleep disturbance, suicidal ideas and depressed mood. The patient is not nervous/anxious.        Objective   Vital Signs  /88   Pulse 85   Temp 97.4 °F (36.3 °C)   Ht 177.6 cm (69.92\")   Wt 72.6 kg (160 lb)   SpO2 99%   BMI 23.01 kg/m²     Physical Exam  Constitutional:       General: He is not in acute distress.     Appearance: Normal appearance. He is normal weight. He is not ill-appearing or toxic-appearing.   HENT:      Head: " Normocephalic.      Right Ear: Hearing, tympanic membrane, ear canal and external ear normal.      Left Ear: Hearing, tympanic membrane, ear canal and external ear normal.      Nose: Nose normal. No congestion or rhinorrhea.      Mouth/Throat:      Mouth: Mucous membranes are moist.      Pharynx: Oropharynx is clear. No oropharyngeal exudate or posterior oropharyngeal erythema.   Eyes:      General: Lids are normal. No allergic shiner, visual field deficit or scleral icterus.     Extraocular Movements: Extraocular movements intact.      Right eye: No nystagmus.      Left eye: No nystagmus.      Conjunctiva/sclera: Conjunctivae normal.      Pupils: Pupils are equal, round, and reactive to light.   Neck:      Thyroid: No thyroid mass, thyromegaly or thyroid tenderness.      Vascular: No carotid bruit.      Trachea: Trachea and phonation normal.   Cardiovascular:      Rate and Rhythm: Normal rate and regular rhythm.      Chest Wall: PMI is not displaced. No thrill.      Pulses: Normal pulses.           Radial pulses are 2+ on the right side and 2+ on the left side.        Dorsalis pedis pulses are 2+ on the right side and 2+ on the left side.        Posterior tibial pulses are 2+ on the right side and 2+ on the left side.      Heart sounds: No murmur heard.    No gallop. No S3 sounds.   Pulmonary:      Effort: Pulmonary effort is normal.      Breath sounds: Normal breath sounds.   Chest:   Breasts:      Right: No axillary adenopathy or supraclavicular adenopathy.      Left: No axillary adenopathy or supraclavicular adenopathy.       Abdominal:      General: Abdomen is flat. Bowel sounds are normal.      Palpations: Abdomen is soft.      Tenderness: There is no abdominal tenderness. There is no guarding or rebound.      Hernia: No hernia is present.   Musculoskeletal:         General: Normal range of motion.      Cervical back: Normal range of motion and neck supple. No rigidity.      Right lower leg: No edema.       Left lower leg: No edema.      Right foot: Normal range of motion. No deformity, bunion, Charcot foot, foot drop or prominent metatarsal heads.      Left foot: Normal range of motion. No deformity, bunion, Charcot foot, foot drop or prominent metatarsal heads.   Feet:      Right foot:      Protective Sensation: 9 sites tested. 9 sites sensed.      Skin integrity: Skin integrity normal.      Toenail Condition: Right toenails are normal.      Left foot:      Protective Sensation: 9 sites tested. 9 sites sensed.      Skin integrity: Skin integrity normal.      Toenail Condition: Left toenails are normal.   Lymphadenopathy:      Head:      Right side of head: No submental, submandibular, tonsillar, preauricular, posterior auricular or occipital adenopathy.      Left side of head: No submental, submandibular, tonsillar, preauricular, posterior auricular or occipital adenopathy.      Cervical: No cervical adenopathy.      Upper Body:      Right upper body: No supraclavicular, axillary, pectoral or epitrochlear adenopathy.      Left upper body: No supraclavicular, axillary, pectoral or epitrochlear adenopathy.   Skin:     General: Skin is warm and dry.      Capillary Refill: Capillary refill takes less than 2 seconds.      Findings: No abrasion.      Nails: There is no clubbing.   Neurological:      General: No focal deficit present.      Mental Status: He is alert and oriented to person, place, and time.      GCS: GCS eye subscore is 4. GCS verbal subscore is 5. GCS motor subscore is 6.      Cranial Nerves: Cranial nerves are intact.      Motor: Motor function is intact.      Coordination: Coordination is intact.      Deep Tendon Reflexes: Reflexes are normal and symmetric.   Psychiatric:         Attention and Perception: Attention and perception normal.         Mood and Affect: Mood and affect normal.         Speech: Speech normal.         Behavior: Behavior normal. Behavior is cooperative.         Thought Content:  Thought content normal.         Cognition and Memory: Cognition and memory normal.         Judgment: Judgment normal.          Result Review :     The following data was reviewed by: IKE Hanks on 04/28/2022:  [facility] 4/15/22 4/15/22 4/16/22 4/19/22    1900 1929     Glucose 271 (A)  228 (A) 120 (A)   BUN 17  14 15   Creatinine 0.73 (A) 0.50 (A) 0.67 (A) 0.72 (A)   Sodium 136  139 135 (A)   Potassium 3.6  3.3 (A) 3.4 (A)   Chloride 93 (A)  97 (A) 91 (A)   Calcium 10.8 (A)  10.2 10.2   Albumin 5.70 (A)  5.40 (A) 5.00   Total Bilirubin 0.8  0.9 0.9   Alkaline Phosphatase 83  76 74   AST (SGOT) 15  14 17   ALT (SGPT) 45 (A)  38 36   (A) Abnormal value              Assessment and Plan    1. Type 2 diabetes mellitus with hyperglycemia, with long-term current use of insulin ([facility])  - POCT Glucose  - insulin detemir (LEVEMIR) 100 UNIT/ML injection; Inject 20 Units under the skin into the appropriate area as directed Every Night.  Dispense: 1 pen; Refill: 5  - CBC & Differential; Future  - Comprehensive Metabolic Panel; Future  - Microalbumin / Creatinine Urine Ratio - Urine, Clean Catch; Future  - BG elevated in office today which is a PP reading but home readings are much improved. Will continue with levemir at 20 units and metformin as prescribed. Discussed self-titration of levemir as listed below. Will follow-up in 3 months for A1c check.   Adjust insulin dose by 2 units 2 times a week per scale.  If morning fasting glucose reading <80, reduce insulin by 2 units.   If morning fasting glucose reading  continue current dose insulin.  If morning fasting glucose reading >130, increase insulin by 2 units.  Call office if morning fasting glucose >250.       2. Annual physical exam  - Nutrition and activity goals reviewed including: mainly water to drink, limit white flour/processed sugar; increase high protein, high fiber carbs, good breakfast, working toward 150 mins cardio per week, resistance training  2x/week.    The patient is here for a health maintenance visit.  Screening lab work is ordered.  Immunizations are reported as current.  Advice and education is given regarding nutrition, aerobic exercise, routine dental evaluations, routine eye exams, reproductive health, cardiovascular risk reduction.  Further recommendations after lab evaluation.  Annual wellness evaluations recommended.     I discussed the patients findings and my recommendations with patient.  Patient was encouraged to keep me informed of any acute changes or any new concerning symptoms.  Patient voiced understanding of all instructions and denied further questions.      Follow Up     Return in about 3 months (around 7/28/2022).    Patient was given instructions and counseling regarding his condition or for health maintenance advice. Please see specific information pulled into the AVS if appropriate.    Part of this note may be an electronic transcription/translation of spoken language to printed text using the Dragon Dictation System.    Electronically signed by:   IKE Hanks  04/28/2022

## 2022-05-02 ENCOUNTER — TELEPHONE (OUTPATIENT)
Dept: INTERNAL MEDICINE | Facility: CLINIC | Age: 27
End: 2022-05-02

## 2022-05-02 NOTE — TELEPHONE ENCOUNTER
PATIENT RETURNING [facility] CALL FOR LAB RESULTS (CANNOT ADDEND ON MESSAGES LEFT ON RESULTS PAGE).    HE WOULD LIKE A PHONE CALL BACK.    MANSOOR BAILEY  [phone]

## 2022-05-20 ENCOUNTER — HOSPITAL ENCOUNTER (EMERGENCY)
Facility: HOSPITAL | Age: 27
Discharge: HOME | End: 2022-05-20
Attending: EMERGENCY MEDICINE | Admitting: EMERGENCY MEDICINE

## 2022-05-20 ENCOUNTER — TELEPHONE (OUTPATIENT)
Dept: INTERNAL MEDICINE | Facility: CLINIC | Age: 27
End: 2022-05-20

## 2022-05-20 VITALS
RESPIRATION RATE: 18 BRPM | TEMPERATURE: 98.2 F | BODY MASS INDEX: 22.4 KG/M2 | HEART RATE: 88 BPM | DIASTOLIC BLOOD PRESSURE: 90 MMHG | OXYGEN SATURATION: 99 % | HEIGHT: 71 IN | SYSTOLIC BLOOD PRESSURE: 140 MMHG | WEIGHT: 160 LBS

## 2022-05-20 DIAGNOSIS — E86.0 DEHYDRATION: ICD-10-CM

## 2022-05-20 DIAGNOSIS — R11.2 NAUSEA AND VOMITING, UNSPECIFIED VOMITING TYPE: Primary | ICD-10-CM

## 2022-05-20 DIAGNOSIS — R03.0 ELEVATED BLOOD PRESSURE READING: ICD-10-CM

## 2022-05-20 LAB
ALBUMIN SERPL-MCNC: 5.9 G/DL (ref 3.5–5.2)
ALBUMIN/GLOB SERPL: 2 G/DL
ALP SERPL-CCNC: 78 U/L (ref 39–117)
ALT SERPL W P-5'-P-CCNC: 26 U/L (ref 1–41)
AMPHET+METHAMPHET UR QL: NEGATIVE
AMPHETAMINES UR QL: NEGATIVE
ANION GAP SERPL CALCULATED.3IONS-SCNC: 16 MMOL/L (ref 5–15)
AST SERPL-CCNC: 15 U/L (ref 1–40)
BACTERIA UR QL AUTO: ABNORMAL /HPF
BARBITURATES UR QL SCN: NEGATIVE
BASOPHILS # BLD AUTO: 0.01 10*3/MM3 (ref 0–0.2)
BASOPHILS NFR BLD AUTO: 0.1 % (ref 0–1.5)
BENZODIAZ UR QL SCN: NEGATIVE
BILIRUB SERPL-MCNC: 0.6 MG/DL (ref 0–1.2)
BILIRUB UR QL STRIP: NEGATIVE
BUN SERPL-MCNC: 16 MG/DL (ref 6–20)
BUN/CREAT SERPL: 23.2 (ref 7–25)
BUPRENORPHINE SERPL-MCNC: NEGATIVE NG/ML
CALCIUM SPEC-SCNC: 10.8 MG/DL (ref 8.6–10.5)
CANNABINOIDS SERPL QL: POSITIVE
CHLORIDE SERPL-SCNC: 98 MMOL/L (ref 98–107)
CLARITY UR: ABNORMAL
CO2 SERPL-SCNC: 27 MMOL/L (ref 22–29)
COCAINE UR QL: NEGATIVE
COLOR UR: YELLOW
CREAT SERPL-MCNC: 0.69 MG/DL (ref 0.76–1.27)
DEPRECATED RDW RBC AUTO: 40.7 FL (ref 37–54)
EGFRCR SERPLBLD CKD-EPI 2021: 130.9 ML/MIN/1.73
EOSINOPHIL # BLD AUTO: 0 10*3/MM3 (ref 0–0.4)
EOSINOPHIL NFR BLD AUTO: 0 % (ref 0.3–6.2)
ERYTHROCYTE [DISTWIDTH] IN BLOOD BY AUTOMATED COUNT: 13.1 % (ref 12.3–15.4)
GLOBULIN UR ELPH-MCNC: 2.9 GM/DL
GLUCOSE SERPL-MCNC: 182 MG/DL (ref 65–99)
GLUCOSE UR STRIP-MCNC: ABNORMAL MG/DL
HCT VFR BLD AUTO: 42.3 % (ref 37.5–51)
HGB BLD-MCNC: 14.9 G/DL (ref 13–17.7)
HGB UR QL STRIP.AUTO: NEGATIVE
HOLD SPECIMEN: NORMAL
HYALINE CASTS UR QL AUTO: ABNORMAL /LPF
IMM GRANULOCYTES # BLD AUTO: 0.1 10*3/MM3 (ref 0–0.05)
IMM GRANULOCYTES NFR BLD AUTO: 1.2 % (ref 0–0.5)
KETONES UR QL STRIP: ABNORMAL
LEUKOCYTE ESTERASE UR QL STRIP.AUTO: NEGATIVE
LIPASE SERPL-CCNC: 9 U/L (ref 13–60)
LYMPHOCYTES # BLD AUTO: 0.5 10*3/MM3 (ref 0.7–3.1)
LYMPHOCYTES NFR BLD AUTO: 6 % (ref 19.6–45.3)
MCH RBC QN AUTO: 30 PG (ref 26.6–33)
MCHC RBC AUTO-ENTMCNC: 35.2 G/DL (ref 31.5–35.7)
MCV RBC AUTO: 85.3 FL (ref 79–97)
METHADONE UR QL SCN: NEGATIVE
MONOCYTES # BLD AUTO: 0.38 10*3/MM3 (ref 0.1–0.9)
MONOCYTES NFR BLD AUTO: 4.5 % (ref 5–12)
NEUTROPHILS NFR BLD AUTO: 7.37 10*3/MM3 (ref 1.7–7)
NEUTROPHILS NFR BLD AUTO: 88.2 % (ref 42.7–76)
NITRITE UR QL STRIP: NEGATIVE
NRBC BLD AUTO-RTO: 0 /100 WBC (ref 0–0.2)
OPIATES UR QL: NEGATIVE
OXYCODONE UR QL SCN: NEGATIVE
PCP UR QL SCN: NEGATIVE
PH UR STRIP.AUTO: 6 [PH] (ref 5–8)
PLATELET # BLD AUTO: 404 10*3/MM3 (ref 140–450)
PMV BLD AUTO: 9.9 FL (ref 6–12)
POTASSIUM SERPL-SCNC: 3.8 MMOL/L (ref 3.5–5.2)
PROPOXYPH UR QL: NEGATIVE
PROT SERPL-MCNC: 8.8 G/DL (ref 6–8.5)
PROT UR QL STRIP: ABNORMAL
RBC # BLD AUTO: 4.96 10*6/MM3 (ref 4.14–5.8)
RBC # UR STRIP: ABNORMAL /HPF
REF LAB TEST METHOD: ABNORMAL
SODIUM SERPL-SCNC: 141 MMOL/L (ref 136–145)
SP GR UR STRIP: >=1.03 (ref 1–1.03)
SQUAMOUS #/AREA URNS HPF: ABNORMAL /HPF
TRICYCLICS UR QL SCN: NEGATIVE
UROBILINOGEN UR QL STRIP: ABNORMAL
WBC # UR STRIP: ABNORMAL /HPF
WBC NRBC COR # BLD: 8.36 10*3/MM3 (ref 3.4–10.8)
WHOLE BLOOD HOLD COAG: NORMAL
WHOLE BLOOD HOLD SPECIMEN: NORMAL

## 2022-05-20 PROCEDURE — 80053 COMPREHEN METABOLIC PANEL: CPT

## 2022-05-20 PROCEDURE — 25010000002 PROMETHAZINE PER 50 MG: Performed by: EMERGENCY MEDICINE

## 2022-05-20 PROCEDURE — 81001 URINALYSIS AUTO W/SCOPE: CPT | Performed by: EMERGENCY MEDICINE

## 2022-05-20 PROCEDURE — 80306 DRUG TEST PRSMV INSTRMNT: CPT | Performed by: EMERGENCY MEDICINE

## 2022-05-20 PROCEDURE — 99283 EMERGENCY DEPT VISIT LOW MDM: CPT

## 2022-05-20 PROCEDURE — 85025 COMPLETE CBC W/AUTO DIFF WBC: CPT | Performed by: EMERGENCY MEDICINE

## 2022-05-20 PROCEDURE — 96374 THER/PROPH/DIAG INJ IV PUSH: CPT

## 2022-05-20 PROCEDURE — 96376 TX/PRO/DX INJ SAME DRUG ADON: CPT

## 2022-05-20 PROCEDURE — 25010000002 ONDANSETRON PER 1 MG: Performed by: EMERGENCY MEDICINE

## 2022-05-20 PROCEDURE — 96375 TX/PRO/DX INJ NEW DRUG ADDON: CPT

## 2022-05-20 PROCEDURE — 83690 ASSAY OF LIPASE: CPT

## 2022-05-20 RX ORDER — PROMETHAZINE HYDROCHLORIDE 25 MG/1
25 TABLET ORAL EVERY 6 HOURS PRN
Qty: 12 TABLET | Refills: 0 | OUTPATIENT
Start: 2022-05-20 | End: 2022-10-17

## 2022-05-20 RX ORDER — ONDANSETRON 2 MG/ML
4 INJECTION INTRAMUSCULAR; INTRAVENOUS ONCE
Status: COMPLETED | OUTPATIENT
Start: 2022-05-20 | End: 2022-05-20

## 2022-05-20 RX ORDER — SODIUM CHLORIDE 9 MG/ML
10 INJECTION INTRAVENOUS AS NEEDED
Status: DISCONTINUED | OUTPATIENT
Start: 2022-05-20 | End: 2022-05-21 | Stop reason: HOSPADM

## 2022-05-20 RX ADMIN — SODIUM CHLORIDE 1000 ML: 9 INJECTION, SOLUTION INTRAVENOUS at 19:15

## 2022-05-20 RX ADMIN — ONDANSETRON 4 MG: 2 INJECTION INTRAMUSCULAR; INTRAVENOUS at 19:14

## 2022-05-20 RX ADMIN — SODIUM CHLORIDE 1000 ML: 9 INJECTION, SOLUTION INTRAVENOUS at 18:41

## 2022-05-20 RX ADMIN — PROMETHAZINE HYDROCHLORIDE 12.5 MG: 25 INJECTION INTRAMUSCULAR; INTRAVENOUS at 23:00

## 2022-05-20 RX ADMIN — ONDANSETRON 4 MG: 2 INJECTION INTRAMUSCULAR; INTRAVENOUS at 18:41

## 2022-05-20 NOTE — ED PROVIDER NOTES
EMERGENCY DEPARTMENT ENCOUNTER    Pt Name: Ford Mukherjee Jr.  MRN: [id number]  Pt :   1995  Room Number:    Date of encounter:  2022  PCP: Khari Bonilla APRN  ED Provider: Derrick Sutherland MD    Historian: Patient      HPI:  Chief Complaint: Nausea and vomiting        Context: Ford Mukherjee Jr. is a 27 y.o. male who presents to the ED c/o nausea and vomiting ongoing for the last 24 hours.  The patient has vomited numerous times.  He states that he has no abdominal pain whatsoever but feels nauseated that he cannot even take sips of water.  He does take Nexium OTC on an every day basis.  He has had no known sick contacts.  He reports having discontinued use of marijuana 3 months ago.  He has tried Zofran ODT without symptom relief.  The patient reports he was initially noncompliant with diabetes medications but now is fully compliant.  The patient underwent EGD toward the end of April.  Slight amount of reddish-brownish emesis occurred prior to arrival.  No gricel bleeding noted.      PAST MEDICAL HISTORY  Past Medical History:   Diagnosis Date   • Diabetes mellitus (HCC)     PT DENIES THIS DX, PREVIOUS DOCUMENTED   • GI (gastrointestinal bleed)          PAST SURGICAL HISTORY  History reviewed. No pertinent surgical history.      FAMILY HISTORY  Family History   Problem Relation Age of Onset   • Diabetes Mother    • No Known Problems Father    • Colon cancer Neg Hx    • Esophageal cancer Neg Hx    • Inflammatory bowel disease Neg Hx    • Irritable bowel syndrome Neg Hx    • Ulcerative colitis Neg Hx          SOCIAL HISTORY  Social History     Socioeconomic History   • Marital status: Single   Tobacco Use   • Smoking status: Former Smoker   • Smokeless tobacco: Never Used   Vaping Use   • Vaping Use: Never used   Substance and Sexual Activity   • Alcohol use: Yes     Comment: SOCIALLY   • Drug use: No   • Sexual activity: Yes     Partners: Female         ALLERGIES  Patient has no  known allergies.        REVIEW OF SYSTEMS  Review of Systems       All systems reviewed and negative except for those discussed in HPI.       PHYSICAL EXAM    I have reviewed the triage vital signs and nursing notes.    ED Triage Vitals [05/20/22 1507]   Temp Heart Rate Resp BP SpO2   98.2 °F (36.8 °C) (!) 130 18 (!) 161/126 99 %      Temp src Heart Rate Source Patient Position BP Location FiO2 (%)   Oral Monitor Sitting Left arm --       Physical Exam  GENERAL:   Appears weak but nontoxic  HENT: Nares patent.  Dry mucous membranes  EYES: No scleral icterus.  CV: Regular rhythm, tachycardic rate.  No murmurs gallops rubs  RESPIRATORY: Normal effort.  No audible wheezes, rales or rhonchi.  Clear to auscultation  ABDOMEN: Soft, nontender to deep palpation  MUSCULOSKELETAL: No deformities.   NEURO: Alert, moves all extremities, follows commands.  SKIN: Warm, dry, no rash visualized.  Slightly pale        LAB RESULTS  Recent Results (from the past 24 hour(s))   Comprehensive Metabolic Panel    Collection Time: 05/20/22  5:16 PM    Specimen: Blood   Result Value Ref Range    Glucose 182 (H) 65 - 99 mg/dL    BUN 16 6 - 20 mg/dL    Creatinine 0.69 (L) 0.76 - 1.27 mg/dL    Sodium 141 136 - 145 mmol/L    Potassium 3.8 3.5 - 5.2 mmol/L    Chloride 98 98 - 107 mmol/L    CO2 27.0 22.0 - 29.0 mmol/L    Calcium 10.8 (H) 8.6 - 10.5 mg/dL    Total Protein 8.8 (H) 6.0 - 8.5 g/dL    Albumin 5.90 (H) 3.50 - 5.20 g/dL    ALT (SGPT) 26 1 - 41 U/L    AST (SGOT) 15 1 - 40 U/L    Alkaline Phosphatase 78 39 - 117 U/L    Total Bilirubin 0.6 0.0 - 1.2 mg/dL    Globulin 2.9 gm/dL    A/G Ratio 2.0 g/dL    BUN/Creatinine Ratio 23.2 7.0 - 25.0    Anion Gap 16.0 (H) 5.0 - 15.0 mmol/L    eGFR 130.9 >60.0 mL/min/1.73   Lipase    Collection Time: 05/20/22  5:16 PM    Specimen: Blood   Result Value Ref Range    Lipase 9 (L) 13 - 60 U/L   Green Top (Gel)    Collection Time: 05/20/22  5:16 PM   Result Value Ref Range    Extra Tube Hold for add-ons.     Lavender Top    Collection Time: 05/20/22  5:16 PM   Result Value Ref Range    Extra Tube hold for add-on    Gold Top - SST    Collection Time: 05/20/22  5:16 PM   Result Value Ref Range    Extra Tube Hold for add-ons.    Gray Top    Collection Time: 05/20/22  5:16 PM   Result Value Ref Range    Extra Tube Hold for add-ons.    Light Blue Top    Collection Time: 05/20/22  5:16 PM   Result Value Ref Range    Extra Tube Hold for add-ons.    CBC Auto Differential    Collection Time: 05/20/22  5:16 PM    Specimen: Blood   Result Value Ref Range    WBC 8.36 3.40 - 10.80 10*3/mm3    RBC 4.96 4.14 - 5.80 10*6/mm3    Hemoglobin 14.9 13.0 - 17.7 g/dL    Hematocrit 42.3 37.5 - 51.0 %    MCV 85.3 79.0 - 97.0 fL    MCH 30.0 26.6 - 33.0 pg    MCHC 35.2 31.5 - 35.7 g/dL    RDW 13.1 12.3 - 15.4 %    RDW-SD 40.7 37.0 - 54.0 fl    MPV 9.9 6.0 - 12.0 fL    Platelets 404 140 - 450 10*3/mm3    Neutrophil % 88.2 (H) 42.7 - 76.0 %    Lymphocyte % 6.0 (L) 19.6 - 45.3 %    Monocyte % 4.5 (L) 5.0 - 12.0 %    Eosinophil % 0.0 (L) 0.3 - 6.2 %    Basophil % 0.1 0.0 - 1.5 %    Immature Grans % 1.2 (H) 0.0 - 0.5 %    Neutrophils, Absolute 7.37 (H) 1.70 - 7.00 10*3/mm3    Lymphocytes, Absolute 0.50 (L) 0.70 - 3.10 10*3/mm3    Monocytes, Absolute 0.38 0.10 - 0.90 10*3/mm3    Eosinophils, Absolute 0.00 0.00 - 0.40 10*3/mm3    Basophils, Absolute 0.01 0.00 - 0.20 10*3/mm3    Immature Grans, Absolute 0.10 (H) 0.00 - 0.05 10*3/mm3    nRBC 0.0 0.0 - 0.2 /100 WBC   Urinalysis With Microscopic If Indicated (No Culture) - Urine, Clean Catch    Collection Time: 05/20/22  8:06 PM    Specimen: Urine, Clean Catch   Result Value Ref Range    Color, UA Yellow Yellow, Straw    Appearance, UA Cloudy (A) Clear    pH, UA 6.0 5.0 - 8.0    Specific Gravity, UA >=1.030 1.001 - 1.030    Glucose,  mg/dL (Trace) (A) Negative    Ketones, UA >=160 mg/dL (4+) (A) Negative    Bilirubin, UA Negative Negative    Blood, UA Negative Negative    Protein,  mg/dL (2+)  (A) Negative    Leuk Esterase, UA Negative Negative    Nitrite, UA Negative Negative    Urobilinogen, UA 0.2 E.U./dL 0.2 - 1.0 E.U./dL   Urine Drug Screen - Urine, Clean Catch    Collection Time: 05/20/22  8:06 PM    Specimen: Urine, Clean Catch   Result Value Ref Range    THC, Screen, Urine Positive (A) Negative    Phencyclidine (PCP), Urine Negative Negative    Cocaine Screen, Urine Negative Negative    Methamphetamine, Ur Negative Negative    Opiate Screen Negative Negative    Amphetamine Screen, Urine Negative Negative    Benzodiazepine Screen, Urine Negative Negative    Tricyclic Antidepressants Screen Negative Negative    Methadone Screen, Urine Negative Negative    Barbiturates Screen, Urine Negative Negative    Oxycodone Screen, Urine Negative Negative    Propoxyphene Screen Negative Negative    Buprenorphine, Screen, Urine Negative Negative   Urinalysis, Microscopic Only - Urine, Clean Catch    Collection Time: 05/20/22  8:06 PM    Specimen: Urine, Clean Catch   Result Value Ref Range    RBC, UA 0-2 None Seen, 0-2 /HPF    WBC, UA 3-5 (A) None Seen, 0-2 /HPF    Bacteria, UA None Seen None Seen, Trace /HPF    Squamous Epithelial Cells, UA 0-2 None Seen, 0-2 /HPF    Hyaline Casts, UA 13-20 0 - 6 /LPF    Methodology Automated Microscopy        If labs were ordered, I independently reviewed the results.        RADIOLOGY  No Radiology Exams Resulted Within Past 24 Hours          PROCEDURES    Procedures    No orders to display       MEDICATIONS GIVEN IN ER    Medications   Sodium Chloride (PF) 0.9 % 10 mL (has no administration in time range)   promethazine (PHENERGAN) 12.5 mg in sodium chloride 0.9 % 50 mL (has no administration in time range)   sodium chloride 0.9 % bolus 1,000 mL (0 mL Intravenous Stopped 5/20/22 1924)   ondansetron (ZOFRAN) injection 4 mg (4 mg Intravenous Given 5/20/22 1841)   sodium chloride 0.9 % bolus 1,000 mL (0 mL Intravenous Stopped 5/20/22 2010)   ondansetron (ZOFRAN) injection 4 mg  (4 mg Intravenous Given 5/20/22 1914)         PROGRESS, DATA ANALYSIS, CONSULTS, AND MEDICAL DECISION MAKING    All labs have been independently reviewed by me.  All radiology studies have been reviewed by me and the radiologist dictating the report.   EKG's have been independently viewed and interpreted by me.      Differential diagnoses: Nausea and vomiting with dehydration apparent.  This is a cyclical vomiting kind of presentation.  Of course marijuana use could lead to something like this and he is positive for marijuana in his system.  Multiple other etiologies are considered as well.      ED Course as of 05/20/22 2249   Fri May 20, 2022   2238 After multiple liters of normal saline along with 2 doses of Zofran the patient was feeling significantly improved.  He drank 1/2 cup of water and then retched again.  He states that he feels that he is about ready to go home and does not wish to be admitted.  We will give him a dose of Phenergan and I will discharge him on oral Phenergan.  I spoke with him about Phenergan suppositories and recommended them.  He declines these.  I will prescribe him tablets.  The patient is insistent that he has not smoked marijuana in 3 months however he still comes up positive on his urine drug screen.  I have stressed the importance of avoiding marijuana at all costs. [MS]      ED Course User Index  [MS] Derrick Sutherland MD             AS OF 22:49 EDT VITALS:    BP - 140/90  HR - 88  TEMP - 98.2 °F (36.8 °C) (Oral)  O2 SATS - 99%                  DIAGNOSIS  Final diagnoses:   Nausea and vomiting, unspecified vomiting type   Dehydration   Elevated blood pressure reading         DISPOSITION  DISCHARGE    Patient discharged in stable condition.    Reviewed implications of results, diagnosis, meds, responsibility to follow up, warning signs and symptoms of possible worsening, potential complications and reasons to return to ER.    Patient/Family voiced understanding of above  instructions.    Discussed plan for discharge, as there is no emergent indication for admission.  Pt/family is agreeable and understands need for follow up and possible repeat testing.  Pt/family is aware that discharge does not mean that nothing is wrong but that it indicates no emergency is currently present that requires admission and they must continue care with follow-up as given below or with a physician of their choice.     FOLLOW-UP  Khari Bonilla, IKE  [address] [facility] [id number] [phone]    In 3 days      [facility] Emergency Department  [address] [facility] [address] [phone]    IF YOU HAVE ANY CONCERN OF WORSENING CONDITION         Where to Get Your Medications      These medications were sent to [id number] - [place], [place] - [address] [place] [address]  AT [facility] - [phone]  - [phone] [address] , [facility] [address]    Phone: [phone]   · promethazine 25 MG tablet        Medication List      No changes were made to your prescriptions during this visit.                  Derrick Sutherland MD  05/20/22 5621

## 2022-05-20 NOTE — TELEPHONE ENCOUNTER
Caller: Ford Mukherjee Jr.    Relationship to patient: Self    Best call back number: [phone]    Patient is needing: PATIENT STATED THAT HE IS HAVING STOMACH ISSUES THAT HE WOULD LIKE TO TALK TO PROVIDER OR NURSE ABOUT TO SEE WHAT HE WOULD NEED TO DO    PLEASE ADVISE

## 2022-05-20 NOTE — TELEPHONE ENCOUNTER
Pt states been vomiting bile/water. Glucose was 144 this morning. Started yesterday, will schedule appt.

## 2022-05-21 NOTE — DISCHARGE INSTRUCTIONS
Take Phenergan as needed.    Avoid marijuana, even marijuana smoke secondhand.    Slowly increase fluid intake starting with sips and gradually increasing.    If you have any concern of worsening condition please return to the emergency department.    Please review the medications you are supposed to be taking according to prior physician recommendations. I have not changed your home medications during this visit. If your discharge instructions indicate that I have changed your home medications, this is not the case, and you should disregard. If you have any questions about the medication you should be taking at home, please call your physician.

## 2022-06-03 ENCOUNTER — TELEPHONE (OUTPATIENT)
Dept: INTERNAL MEDICINE | Facility: CLINIC | Age: 27
End: 2022-06-03

## 2022-06-03 NOTE — TELEPHONE ENCOUNTER
"\"HUB TO READ\"    LVM LETTING PT JHONATAN NIELSEN ESTABLISHED HIM WITH IKE LAURENT.    PLEASE RESCHEDULE IN EST-OFFBOARDING SLOT IF NEED BE.  "

## 2022-06-14 ENCOUNTER — HOSPITAL ENCOUNTER (EMERGENCY)
Facility: HOSPITAL | Age: 27
Discharge: HOME | End: 2022-06-14
Attending: EMERGENCY MEDICINE | Admitting: EMERGENCY MEDICINE

## 2022-06-14 VITALS
WEIGHT: 160 LBS | BODY MASS INDEX: 22.4 KG/M2 | RESPIRATION RATE: 16 BRPM | HEIGHT: 71 IN | DIASTOLIC BLOOD PRESSURE: 100 MMHG | TEMPERATURE: 98.3 F | SYSTOLIC BLOOD PRESSURE: 157 MMHG | OXYGEN SATURATION: 99 % | HEART RATE: 109 BPM

## 2022-06-14 DIAGNOSIS — E11.65 HYPERGLYCEMIA DUE TO DIABETES MELLITUS: ICD-10-CM

## 2022-06-14 DIAGNOSIS — R11.2 NAUSEA AND VOMITING, UNSPECIFIED VOMITING TYPE: Primary | ICD-10-CM

## 2022-06-14 DIAGNOSIS — F12.90 MARIJUANA USE: ICD-10-CM

## 2022-06-14 LAB
ALBUMIN SERPL-MCNC: 5.3 G/DL (ref 3.5–5.2)
ALBUMIN/GLOB SERPL: 1.8 G/DL
ALP SERPL-CCNC: 81 U/L (ref 39–117)
ALT SERPL W P-5'-P-CCNC: 21 U/L (ref 1–41)
AMPHET+METHAMPHET UR QL: NEGATIVE
AMPHETAMINES UR QL: NEGATIVE
ANION GAP SERPL CALCULATED.3IONS-SCNC: 15 MMOL/L (ref 5–15)
AST SERPL-CCNC: 12 U/L (ref 1–40)
BACTERIA UR QL AUTO: NORMAL /HPF
BARBITURATES UR QL SCN: NEGATIVE
BASOPHILS # BLD AUTO: 0.01 10*3/MM3 (ref 0–0.2)
BASOPHILS NFR BLD AUTO: 0.1 % (ref 0–1.5)
BENZODIAZ UR QL SCN: NEGATIVE
BILIRUB SERPL-MCNC: 0.7 MG/DL (ref 0–1.2)
BILIRUB UR QL STRIP: NEGATIVE
BUN SERPL-MCNC: 16 MG/DL (ref 6–20)
BUN/CREAT SERPL: 23.9 (ref 7–25)
BUPRENORPHINE SERPL-MCNC: NEGATIVE NG/ML
CALCIUM SPEC-SCNC: 10.4 MG/DL (ref 8.6–10.5)
CANNABINOIDS SERPL QL: POSITIVE
CHLORIDE SERPL-SCNC: 102 MMOL/L (ref 98–107)
CLARITY UR: CLEAR
CO2 SERPL-SCNC: 28 MMOL/L (ref 22–29)
COCAINE UR QL: NEGATIVE
COLOR UR: YELLOW
CREAT SERPL-MCNC: 0.67 MG/DL (ref 0.76–1.27)
DEPRECATED RDW RBC AUTO: 41.9 FL (ref 37–54)
EGFRCR SERPLBLD CKD-EPI 2021: 132.1 ML/MIN/1.73
EOSINOPHIL # BLD AUTO: 0 10*3/MM3 (ref 0–0.4)
EOSINOPHIL NFR BLD AUTO: 0 % (ref 0.3–6.2)
ERYTHROCYTE [DISTWIDTH] IN BLOOD BY AUTOMATED COUNT: 13 % (ref 12.3–15.4)
GLOBULIN UR ELPH-MCNC: 2.9 GM/DL
GLUCOSE BLDC GLUCOMTR-MCNC: 173 MG/DL (ref 70–130)
GLUCOSE SERPL-MCNC: 192 MG/DL (ref 65–99)
GLUCOSE UR STRIP-MCNC: ABNORMAL MG/DL
HCT VFR BLD AUTO: 40.5 % (ref 37.5–51)
HGB BLD-MCNC: 14.5 G/DL (ref 13–17.7)
HGB UR QL STRIP.AUTO: NEGATIVE
HOLD SPECIMEN: NORMAL
HYALINE CASTS UR QL AUTO: NORMAL /LPF
IMM GRANULOCYTES # BLD AUTO: 0.03 10*3/MM3 (ref 0–0.05)
IMM GRANULOCYTES NFR BLD AUTO: 0.3 % (ref 0–0.5)
KETONES UR QL STRIP: ABNORMAL
LEUKOCYTE ESTERASE UR QL STRIP.AUTO: NEGATIVE
LIPASE SERPL-CCNC: 8 U/L (ref 13–60)
LYMPHOCYTES # BLD AUTO: 0.63 10*3/MM3 (ref 0.7–3.1)
LYMPHOCYTES NFR BLD AUTO: 6.5 % (ref 19.6–45.3)
MCH RBC QN AUTO: 31.5 PG (ref 26.6–33)
MCHC RBC AUTO-ENTMCNC: 35.8 G/DL (ref 31.5–35.7)
MCV RBC AUTO: 87.9 FL (ref 79–97)
METHADONE UR QL SCN: NEGATIVE
MONOCYTES # BLD AUTO: 0.55 10*3/MM3 (ref 0.1–0.9)
MONOCYTES NFR BLD AUTO: 5.6 % (ref 5–12)
NEUTROPHILS NFR BLD AUTO: 8.54 10*3/MM3 (ref 1.7–7)
NEUTROPHILS NFR BLD AUTO: 87.5 % (ref 42.7–76)
NITRITE UR QL STRIP: NEGATIVE
NRBC BLD AUTO-RTO: 0 /100 WBC (ref 0–0.2)
OPIATES UR QL: NEGATIVE
OXYCODONE UR QL SCN: NEGATIVE
PCP UR QL SCN: NEGATIVE
PH UR STRIP.AUTO: 8 [PH] (ref 5–8)
PLATELET # BLD AUTO: 335 10*3/MM3 (ref 140–450)
PMV BLD AUTO: 10.3 FL (ref 6–12)
POTASSIUM SERPL-SCNC: 3.6 MMOL/L (ref 3.5–5.2)
PROPOXYPH UR QL: NEGATIVE
PROT SERPL-MCNC: 8.2 G/DL (ref 6–8.5)
PROT UR QL STRIP: ABNORMAL
RBC # BLD AUTO: 4.61 10*6/MM3 (ref 4.14–5.8)
RBC # UR STRIP: NORMAL /HPF
REF LAB TEST METHOD: NORMAL
SODIUM SERPL-SCNC: 145 MMOL/L (ref 136–145)
SP GR UR STRIP: 1.02 (ref 1–1.03)
SQUAMOUS #/AREA URNS HPF: NORMAL /HPF
TRICYCLICS UR QL SCN: NEGATIVE
UROBILINOGEN UR QL STRIP: ABNORMAL
WBC # UR STRIP: NORMAL /HPF
WBC NRBC COR # BLD: 9.76 10*3/MM3 (ref 3.4–10.8)
WHOLE BLOOD HOLD COAG: NORMAL
WHOLE BLOOD HOLD SPECIMEN: NORMAL

## 2022-06-14 PROCEDURE — 83690 ASSAY OF LIPASE: CPT | Performed by: EMERGENCY MEDICINE

## 2022-06-14 PROCEDURE — 80053 COMPREHEN METABOLIC PANEL: CPT | Performed by: EMERGENCY MEDICINE

## 2022-06-14 PROCEDURE — 96374 THER/PROPH/DIAG INJ IV PUSH: CPT

## 2022-06-14 PROCEDURE — 80306 DRUG TEST PRSMV INSTRMNT: CPT | Performed by: PHYSICIAN ASSISTANT

## 2022-06-14 PROCEDURE — 99283 EMERGENCY DEPT VISIT LOW MDM: CPT

## 2022-06-14 PROCEDURE — 25010000002 DROPERIDOL PER 5 MG: Performed by: PHYSICIAN ASSISTANT

## 2022-06-14 PROCEDURE — 85025 COMPLETE CBC W/AUTO DIFF WBC: CPT | Performed by: EMERGENCY MEDICINE

## 2022-06-14 PROCEDURE — 96375 TX/PRO/DX INJ NEW DRUG ADDON: CPT

## 2022-06-14 PROCEDURE — 81001 URINALYSIS AUTO W/SCOPE: CPT | Performed by: EMERGENCY MEDICINE

## 2022-06-14 PROCEDURE — 82962 GLUCOSE BLOOD TEST: CPT

## 2022-06-14 PROCEDURE — 25010000002 ONDANSETRON PER 1 MG: Performed by: PHYSICIAN ASSISTANT

## 2022-06-14 RX ORDER — ONDANSETRON 2 MG/ML
4 INJECTION INTRAMUSCULAR; INTRAVENOUS ONCE
Status: DISCONTINUED | OUTPATIENT
Start: 2022-06-14 | End: 2022-06-14

## 2022-06-14 RX ORDER — DROPERIDOL 2.5 MG/ML
2.5 INJECTION, SOLUTION INTRAMUSCULAR; INTRAVENOUS ONCE
Status: COMPLETED | OUTPATIENT
Start: 2022-06-14 | End: 2022-06-14

## 2022-06-14 RX ORDER — PANTOPRAZOLE SODIUM 40 MG/10ML
40 INJECTION, POWDER, LYOPHILIZED, FOR SOLUTION INTRAVENOUS ONCE
Status: COMPLETED | OUTPATIENT
Start: 2022-06-14 | End: 2022-06-14

## 2022-06-14 RX ORDER — ONDANSETRON 2 MG/ML
4 INJECTION INTRAMUSCULAR; INTRAVENOUS ONCE
Status: COMPLETED | OUTPATIENT
Start: 2022-06-14 | End: 2022-06-14

## 2022-06-14 RX ORDER — SODIUM CHLORIDE 9 MG/ML
10 INJECTION INTRAVENOUS AS NEEDED
Status: DISCONTINUED | OUTPATIENT
Start: 2022-06-14 | End: 2022-06-14 | Stop reason: HOSPADM

## 2022-06-14 RX ORDER — FAMOTIDINE 10 MG/ML
20 INJECTION, SOLUTION INTRAVENOUS ONCE
Status: DISCONTINUED | OUTPATIENT
Start: 2022-06-14 | End: 2022-06-14

## 2022-06-14 RX ADMIN — ONDANSETRON 4 MG: 2 INJECTION INTRAMUSCULAR; INTRAVENOUS at 10:48

## 2022-06-14 RX ADMIN — SODIUM CHLORIDE 1000 ML: 9 INJECTION, SOLUTION INTRAVENOUS at 10:48

## 2022-06-14 RX ADMIN — PANTOPRAZOLE SODIUM 40 MG: 40 INJECTION, POWDER, FOR SOLUTION INTRAVENOUS at 10:48

## 2022-06-14 RX ADMIN — DROPERIDOL 2.5 MG: 2.5 INJECTION, SOLUTION INTRAMUSCULAR; INTRAVENOUS at 12:41

## 2022-06-14 NOTE — ED PROVIDER NOTES
Subjective   Pt is a 28 yo male presenting to ED with complaints of vomiting. PMHx significant for DM (insulin) and GI Bleed. Pt reports vomiting and nausea since yesterday. He has tried Zofran at home with little relief. He denies abdominal pain or diarrhea. He states he ate late the night before the vomiting began which often will cause him to vomit. He also reports he has been under increased stress with mother in hospital and he has not been sleeping well. He denies known sick contacts. He had an EGD in April which he reports was normal. He states yesterday he had an episode where the vomit looked dark, coffee ground but today that resolved. He does not take blood thinners. He denies ETOH or tobacco use. He admits to occasional marijuana use but states hasn't used since April.       History provided by:  Patient and medical records      Review of Systems   Constitutional: Negative for chills and fever.   HENT: Negative for congestion, sore throat and trouble swallowing.    Eyes: Negative for visual disturbance.   Respiratory: Negative for cough and shortness of breath.    Cardiovascular: Negative for chest pain and leg swelling.   Gastrointestinal: Positive for nausea and vomiting. Negative for abdominal pain, blood in stool, constipation and diarrhea.   Genitourinary: Negative for difficulty urinating, dysuria and flank pain.   Musculoskeletal: Negative for arthralgias and back pain.   Skin: Negative.  Negative for rash and wound.   Allergic/Immunologic: Negative.    Neurological: Negative for dizziness, syncope, weakness, numbness and headaches.   Psychiatric/Behavioral: Negative for confusion.   All other systems reviewed and are negative.      Past Medical History:   Diagnosis Date   • Diabetes mellitus (HCC)     PT DENIES THIS DX, PREVIOUS DOCUMENTED   • GI (gastrointestinal bleed)        No Known Allergies    No past surgical history on file.    Family History   Problem Relation Age of Onset   • Diabetes  Mother    • No Known Problems Father    • Colon cancer Neg Hx    • Esophageal cancer Neg Hx    • Inflammatory bowel disease Neg Hx    • Irritable bowel syndrome Neg Hx    • Ulcerative colitis Neg Hx        Social History     Socioeconomic History   • Marital status: Single   Tobacco Use   • Smoking status: Former Smoker   • Smokeless tobacco: Never Used   Vaping Use   • Vaping Use: Never used   Substance and Sexual Activity   • Alcohol use: Yes     Comment: SOCIALLY   • Drug use: No   • Sexual activity: Yes     Partners: Female           Objective   Physical Exam  Vitals and nursing note reviewed.   Constitutional:       Appearance: He is well-developed.   HENT:      Head: Atraumatic.      Nose: Nose normal.   Eyes:      General: Lids are normal.      Conjunctiva/sclera: Conjunctivae normal.      Pupils: Pupils are equal, round, and reactive to light.   Cardiovascular:      Rate and Rhythm: Regular rhythm. Tachycardia present.      Heart sounds: Normal heart sounds.   Pulmonary:      Effort: Pulmonary effort is normal.      Breath sounds: Normal breath sounds.   Abdominal:      General: There is no distension.      Palpations: Abdomen is soft.      Tenderness: There is no abdominal tenderness. There is no guarding or rebound.   Musculoskeletal:         General: No tenderness or deformity. Normal range of motion.      Cervical back: Normal range of motion and neck supple.   Skin:     General: Skin is warm and dry.   Neurological:      Mental Status: He is alert and oriented to person, place, and time.      Sensory: No sensory deficit.   Psychiatric:         Behavior: Behavior normal.         Procedures           ED Course  ED Course as of 06/14/22 1243   Tue Jun 14, 2022   1229 THC Screen, Urine(!): Positive [RT]      ED Course User Index  [RT] Bertha Elliott PA      Re-examined patient several times in ED. Pt resting and feeling better. No vomiting after meds in ED. He continues to deny any abdominal pain.  Discussed results and tx plan. Counseled on marijuana cessation.     Reviewed old records.     Recent Results (from the past 24 hour(s))   POC Glucose Once    Collection Time: 06/14/22  9:37 AM    Specimen: Blood   Result Value Ref Range    Glucose 173 (H) 70 - 130 mg/dL   Comprehensive Metabolic Panel    Collection Time: 06/14/22  9:52 AM    Specimen: Blood   Result Value Ref Range    Glucose 192 (H) 65 - 99 mg/dL    BUN 16 6 - 20 mg/dL    Creatinine 0.67 (L) 0.76 - 1.27 mg/dL    Sodium 145 136 - 145 mmol/L    Potassium 3.6 3.5 - 5.2 mmol/L    Chloride 102 98 - 107 mmol/L    CO2 28.0 22.0 - 29.0 mmol/L    Calcium 10.4 8.6 - 10.5 mg/dL    Total Protein 8.2 6.0 - 8.5 g/dL    Albumin 5.30 (H) 3.50 - 5.20 g/dL    ALT (SGPT) 21 1 - 41 U/L    AST (SGOT) 12 1 - 40 U/L    Alkaline Phosphatase 81 39 - 117 U/L    Total Bilirubin 0.7 0.0 - 1.2 mg/dL    Globulin 2.9 gm/dL    A/G Ratio 1.8 g/dL    BUN/Creatinine Ratio 23.9 7.0 - 25.0    Anion Gap 15.0 5.0 - 15.0 mmol/L    eGFR 132.1 >60.0 mL/min/1.73   Lipase    Collection Time: 06/14/22  9:52 AM    Specimen: Blood   Result Value Ref Range    Lipase 8 (L) 13 - 60 U/L   Green Top (Gel)    Collection Time: 06/14/22  9:52 AM   Result Value Ref Range    Extra Tube Hold for add-ons.    Lavender Top    Collection Time: 06/14/22  9:52 AM   Result Value Ref Range    Extra Tube hold for add-on    Gold Top - SST    Collection Time: 06/14/22  9:52 AM   Result Value Ref Range    Extra Tube Hold for add-ons.    Light Blue Top    Collection Time: 06/14/22  9:52 AM   Result Value Ref Range    Extra Tube Hold for add-ons.    CBC Auto Differential    Collection Time: 06/14/22  9:52 AM    Specimen: Blood   Result Value Ref Range    WBC 9.76 3.40 - 10.80 10*3/mm3    RBC 4.61 4.14 - 5.80 10*6/mm3    Hemoglobin 14.5 13.0 - 17.7 g/dL    Hematocrit 40.5 37.5 - 51.0 %    MCV 87.9 79.0 - 97.0 fL    MCH 31.5 26.6 - 33.0 pg    MCHC 35.8 (H) 31.5 - 35.7 g/dL    RDW 13.0 12.3 - 15.4 %    RDW-SD 41.9 37.0 -  54.0 fl    MPV 10.3 6.0 - 12.0 fL    Platelets 335 140 - 450 10*3/mm3    Neutrophil % 87.5 (H) 42.7 - 76.0 %    Lymphocyte % 6.5 (L) 19.6 - 45.3 %    Monocyte % 5.6 5.0 - 12.0 %    Eosinophil % 0.0 (L) 0.3 - 6.2 %    Basophil % 0.1 0.0 - 1.5 %    Immature Grans % 0.3 0.0 - 0.5 %    Neutrophils, Absolute 8.54 (H) 1.70 - 7.00 10*3/mm3    Lymphocytes, Absolute 0.63 (L) 0.70 - 3.10 10*3/mm3    Monocytes, Absolute 0.55 0.10 - 0.90 10*3/mm3    Eosinophils, Absolute 0.00 0.00 - 0.40 10*3/mm3    Basophils, Absolute 0.01 0.00 - 0.20 10*3/mm3    Immature Grans, Absolute 0.03 0.00 - 0.05 10*3/mm3    nRBC 0.0 0.0 - 0.2 /100 WBC   Urinalysis With Microscopic If Indicated (No Culture) - Urine, Clean Catch    Collection Time: 06/14/22 11:51 AM    Specimen: Urine, Clean Catch   Result Value Ref Range    Color, UA Yellow Yellow, Straw    Appearance, UA Clear Clear    pH, UA 8.0 5.0 - 8.0    Specific Gravity, UA 1.025 1.001 - 1.030    Glucose,  mg/dL (1+) (A) Negative    Ketones, UA >=160 mg/dL (4+) (A) Negative    Bilirubin, UA Negative Negative    Blood, UA Negative Negative    Protein, UA 30 mg/dL (1+) (A) Negative    Leuk Esterase, UA Negative Negative    Nitrite, UA Negative Negative    Urobilinogen, UA 1.0 E.U./dL 0.2 - 1.0 E.U./dL   Urine Drug Screen - Urine, Clean Catch    Collection Time: 06/14/22 11:51 AM    Specimen: Urine, Clean Catch   Result Value Ref Range    THC, Screen, Urine Positive (A) Negative    Phencyclidine (PCP), Urine Negative Negative    Cocaine Screen, Urine Negative Negative    Methamphetamine, Ur Negative Negative    Opiate Screen Negative Negative    Amphetamine Screen, Urine Negative Negative    Benzodiazepine Screen, Urine Negative Negative    Tricyclic Antidepressants Screen Negative Negative    Methadone Screen, Urine Negative Negative    Barbiturates Screen, Urine Negative Negative    Oxycodone Screen, Urine Negative Negative    Propoxyphene Screen Negative Negative    Buprenorphine, Screen,  "Urine Negative Negative   Urinalysis, Microscopic Only - Urine, Clean Catch    Collection Time: 06/14/22 11:51 AM    Specimen: Urine, Clean Catch   Result Value Ref Range    RBC, UA 0-2 None Seen, 0-2 /HPF    WBC, UA 0-2 None Seen, 0-2 /HPF    Bacteria, UA None Seen None Seen, Trace /HPF    Squamous Epithelial Cells, UA 0-2 None Seen, 0-2 /HPF    Hyaline Casts, UA 0-6 0 - 6 /LPF    Methodology Automated Microscopy      Note: In addition to lab results from this visit, the labs listed above may include labs taken at another facility or during a different encounter within the last 24 hours. Please correlate lab times with ED admission and discharge times for further clarification of the services performed during this visit.    No orders to display     Vitals:    06/14/22 0936   BP: (!) 176/115   BP Location: Left arm   Patient Position: Sitting   Pulse: 109   Resp: 16   Temp: 98.3 °F (36.8 °C)   SpO2: 98%   Weight: 72.6 kg (160 lb)   Height: 180.3 cm (71\")     Medications   Sodium Chloride (PF) 0.9 % 10 mL (has no administration in time range)   sodium chloride 0.9 % bolus 1,000 mL (1,000 mL Intravenous New Bag 6/14/22 1048)   ondansetron (ZOFRAN) injection 4 mg (4 mg Intravenous Given 6/14/22 1048)   pantoprazole (PROTONIX) injection 40 mg (40 mg Intravenous Given 6/14/22 1048)   droperidol (INAPSINE) injection 2.5 mg (2.5 mg Intravenous Given 6/14/22 1241)     ECG/EMG Results (last 24 hours)     ** No results found for the last 24 hours. **        No orders to display       DISCHARGE    Patient discharged in stable condition.    Reviewed implications of results, diagnosis, meds, responsibility to follow up, warning signs and symptoms of possible worsening, potential complications and reasons to return to ER.    Patient/Family voiced understanding of above instructions.    Discussed plan for discharge, as there is no emergent indication for admission.  Pt/family is agreeable and understands need for follow up and " possible repeat testing.  Pt/family is aware that discharge does not mean that nothing is wrong but that it indicates no emergency is currently present that requires admission and they must continue care with follow-up as given below or with a physician of their choice.     FOLLOW-UP  Khari Bonilla, IKE  [address] [facility] [address] [phone]    Schedule an appointment as soon as possible for a visit       [facility] Emergency Department  [address] [place] [address] [phone]    If symptoms worsen         Medication List      No changes were made to your prescriptions during this visit.                                                  MDM    Final diagnoses:   Nausea and vomiting, unspecified vomiting type   Hyperglycemia due to diabetes mellitus (HCC)   Marijuana use       ED Disposition  ED Disposition     ED Disposition   Discharge    Condition   Stable    Comment   --             Khari Bonilla, IKE  [address] [facility] [address] [phone]    Schedule an appointment as soon as possible for a visit       [facility] Emergency Department  [address] [place] [address] [phone]    If symptoms worsen         Medication List      No changes were made to your prescriptions during this visit.          Bertha Elliott PA  06/14/22 1248

## 2022-07-28 ENCOUNTER — OFFICE VISIT (OUTPATIENT)
Dept: INTERNAL MEDICINE | Facility: CLINIC | Age: 27
End: 2022-07-28

## 2022-07-28 VITALS
BODY MASS INDEX: 21.7 KG/M2 | DIASTOLIC BLOOD PRESSURE: 80 MMHG | SYSTOLIC BLOOD PRESSURE: 126 MMHG | RESPIRATION RATE: 16 BRPM | TEMPERATURE: 97.8 F | HEART RATE: 93 BPM | OXYGEN SATURATION: 97 % | WEIGHT: 155 LBS | HEIGHT: 71 IN

## 2022-07-28 DIAGNOSIS — E11.65 TYPE 2 DIABETES MELLITUS WITH HYPERGLYCEMIA, WITH LONG-TERM CURRENT USE OF INSULIN: Primary | ICD-10-CM

## 2022-07-28 DIAGNOSIS — R11.0 NAUSEA: ICD-10-CM

## 2022-07-28 DIAGNOSIS — Z79.4 TYPE 2 DIABETES MELLITUS WITH HYPERGLYCEMIA, WITH LONG-TERM CURRENT USE OF INSULIN: Primary | ICD-10-CM

## 2022-07-28 DIAGNOSIS — K21.9 GASTROESOPHAGEAL REFLUX DISEASE, UNSPECIFIED WHETHER ESOPHAGITIS PRESENT: ICD-10-CM

## 2022-07-28 LAB
EXPIRATION DATE: ABNORMAL
HBA1C MFR BLD: 7.4 %
Lab: ABNORMAL

## 2022-07-28 PROCEDURE — 99214 OFFICE O/P EST MOD 30 MIN: CPT | Performed by: NURSE PRACTITIONER

## 2022-07-28 PROCEDURE — 83036 HEMOGLOBIN GLYCOSYLATED A1C: CPT | Performed by: NURSE PRACTITIONER

## 2022-07-28 PROCEDURE — 3051F HG A1C>EQUAL 7.0%<8.0%: CPT | Performed by: NURSE PRACTITIONER

## 2022-07-28 RX ORDER — BLOOD PRESSURE TEST KIT
1 KIT MISCELLANEOUS DAILY
Qty: 100 EACH | Refills: 5 | Status: SHIPPED | OUTPATIENT
Start: 2022-07-28

## 2022-07-28 RX ORDER — OMEPRAZOLE 20 MG/1
20 CAPSULE, DELAYED RELEASE ORAL DAILY
Qty: 90 CAPSULE | Refills: 1 | OUTPATIENT
Start: 2022-07-28 | End: 2022-10-17

## 2022-07-28 NOTE — PROGRESS NOTES
New Patient Office Visit      Date: 2022   Patient Name: Ford Mukherjee Jr.  : 1995   MRN: [id number]     Chief Complaint:    Chief Complaint   Patient presents with   • Establish Care   • Diabetes     Off boarding [facility], follow up       History of Present Illness: Ford Mukherjee Jr. is a 27 y.o. male who is here today to establish care. He has been seen at the ER on two occasions since his last visit in the office for N/V. He states this is now resolved. He remains compliant with his diabetes medication. He checks his blood sugar every morning and it typically runs around 120s-130. He ran out of insulin last week, but otherwise has been working at making dietary changes to support his DM. He hopes to start exercising regularly. He does note that if he chugs a lot of water in the morning, it precipitates an episode of vomiting.     Subjective      Review of Systems:   Review of Systems   Constitutional: Negative for chills, fatigue, fever, unexpected weight gain and unexpected weight loss.   HENT: Negative.    Eyes: Negative.    Respiratory: Negative for cough, shortness of breath and wheezing.    Cardiovascular: Negative for chest pain.   Gastrointestinal: Negative for abdominal pain, constipation, diarrhea, nausea, vomiting and indigestion.   Endocrine: Negative for polydipsia, polyphagia and polyuria.       Past Medical History:   Past Medical History:   Diagnosis Date   • Diabetes mellitus (HCC)     PT DENIES THIS DX, PREVIOUS DOCUMENTED   • GI (gastrointestinal bleed)        Past Surgical History: History reviewed. No pertinent surgical history.    Family History:   Family History   Problem Relation Age of Onset   • Diabetes Mother    • No Known Problems Father    • Colon cancer Neg Hx    • Esophageal cancer Neg Hx    • Inflammatory bowel disease Neg Hx    • Irritable bowel syndrome Neg Hx    • Ulcerative colitis Neg Hx        Social History:   Social History     Socioeconomic  "History   • Marital status: Single   Tobacco Use   • Smoking status: Former Smoker   • Smokeless tobacco: Never Used   Vaping Use   • Vaping Use: Never used   Substance and Sexual Activity   • Alcohol use: Yes     Comment: SOCIALLY   • Drug use: No   • Sexual activity: Yes     Partners: Female       Medications:     Current Outpatient Medications:   •  Blood Glucose Monitoring Suppl w/Device kit, Check BG one time per day., Disp: 1 each, Rfl: 0  •  glucose blood test strip, Check BG one time per day., Disp: 100 each, Rfl: 0  •  insulin detemir (LEVEMIR) 100 UNIT/ML injection, Inject 20 Units under the skin into the appropriate area as directed Every Night., Disp: 1 pen, Rfl: 5  •  Insulin Pen Needle (B-D UF III MINI PEN NEEDLES) 31G X 5 MM misc, 1 pen Every Night., Disp: 30 each, Rfl: 0  •  Lancets (freestyle) lancets, Check BG once per day., Disp: 100 each, Rfl: 0  •  metFORMIN (GLUCOPHAGE) 1000 MG tablet, Take 1 tablet by mouth 2 (Two) Times a Day With Meals., Disp: 60 tablet, Rfl: 5  •  omeprazole (priLOSEC) 20 MG capsule, Take 1 capsule by mouth Daily., Disp: 90 capsule, Rfl: 1  •  promethazine (PHENERGAN) 25 MG tablet, Take 1 tablet by mouth Every 6 (Six) Hours As Needed for Nausea or Vomiting., Disp: 12 tablet, Rfl: 0  •  Alcohol Swabs pads, 1 swab Daily., Disp: 100 each, Rfl: 5    Allergies:   No Known Allergies    Objective     Physical Exam:  Vital Signs:   Vitals:    07/28/22 0848   BP: 126/80   Pulse: 93   Resp: 16   Temp: 97.8 °F (36.6 °C)   SpO2: 97%   Weight: 70.3 kg (155 lb)   Height: 180.3 cm (71\")   PainSc: 0-No pain     Body mass index is 21.62 kg/m².  BMI is within normal parameters. No other follow-up for BMI required.       Physical Exam  Vitals and nursing note reviewed.   Constitutional:       General: He is awake. He is not in acute distress.     Appearance: Normal appearance. He is well-developed and normal weight. He is not ill-appearing or diaphoretic.   HENT:      Head: Normocephalic and " atraumatic.      Mouth/Throat:      Mouth: Mucous membranes are moist.      Pharynx: Oropharynx is clear.   Eyes:      Extraocular Movements: Extraocular movements intact.      Pupils: Pupils are equal, round, and reactive to light.   Neck:      Vascular: No JVD.   Cardiovascular:      Rate and Rhythm: Normal rate and regular rhythm.      Pulses: Normal pulses.      Heart sounds: Normal heart sounds.     No S3 or S4 sounds.   Pulmonary:      Effort: Pulmonary effort is normal. No respiratory distress.      Breath sounds: Normal breath sounds and air entry.   Musculoskeletal:      Cervical back: Normal range of motion and neck supple.   Lymphadenopathy:      Cervical: No cervical adenopathy.   Skin:     General: Skin is warm and dry.      Capillary Refill: Capillary refill takes less than 2 seconds.   Neurological:      General: No focal deficit present.      Mental Status: He is alert and oriented to person, place, and time. Mental status is at baseline.      Cranial Nerves: Cranial nerves are intact.   Psychiatric:         Attention and Perception: Attention and perception normal.         Mood and Affect: Mood and affect normal.         Speech: Speech normal.         Behavior: Behavior normal.         Thought Content: Thought content normal.         Judgment: Judgment normal.         Procedures    Results:     Labs:A1C= 7.4 (prev 9)    Imaging:     Assessment / Plan      Assessment/Plan:   Diagnoses and all orders for this visit:    1. Type 2 diabetes mellitus with hyperglycemia, with long-term current use of insulin (HCC) (Primary)  -     POC Glycosylated Hemoglobin (Hb A1C)  -     insulin detemir (LEVEMIR) 100 UNIT/ML injection; Inject 20 Units under the skin into the appropriate area as directed Every Night.  Dispense: 1 pen; Refill: 5  -     Alcohol Swabs pads; 1 swab Daily.  Dispense: 100 each; Refill: 5    2. Gastroesophageal reflux disease, unspecified whether esophagitis present  Comments:  discussed  dietary measures: reducing spicy foods and alcohol.   Remain upright for 30 minutes after meals.   Orders:  -     omeprazole (priLOSEC) 20 MG capsule; Take 1 capsule by mouth Daily.  Dispense: 90 capsule; Refill: 1         Follow Up:   Return in about 3 months (around 10/28/2022) for Recheck.    IKE Spivey  [facility] [place]   Ban Dailey  CLINICAL NEUROPHYSIOLOGY  [address] [facility], [address] [place] [address]  Phone: [phone]  Fax: [phone]  Follow Up Time: Urgent

## 2022-10-17 ENCOUNTER — HOSPITAL ENCOUNTER (EMERGENCY)
Facility: HOSPITAL | Age: 27
Discharge: HOME | End: 2022-10-17
Attending: EMERGENCY MEDICINE | Admitting: EMERGENCY MEDICINE

## 2022-10-17 ENCOUNTER — APPOINTMENT (OUTPATIENT)
Dept: CT IMAGING | Facility: HOSPITAL | Age: 27
End: 2022-10-17

## 2022-10-17 VITALS
DIASTOLIC BLOOD PRESSURE: 99 MMHG | WEIGHT: 160 LBS | BODY MASS INDEX: 22.4 KG/M2 | HEART RATE: 85 BPM | HEIGHT: 71 IN | TEMPERATURE: 98.6 F | OXYGEN SATURATION: 98 % | RESPIRATION RATE: 18 BRPM | SYSTOLIC BLOOD PRESSURE: 141 MMHG

## 2022-10-17 DIAGNOSIS — K82.8 SLUDGE IN GALLBLADDER: ICD-10-CM

## 2022-10-17 DIAGNOSIS — R11.2 NAUSEA AND VOMITING, UNSPECIFIED VOMITING TYPE: Primary | ICD-10-CM

## 2022-10-17 DIAGNOSIS — K70.0 FATTY LIVER DUE TO ALCOHOLISM: ICD-10-CM

## 2022-10-17 DIAGNOSIS — F12.90 MARIJUANA USE: ICD-10-CM

## 2022-10-17 DIAGNOSIS — Z78.9 ALCOHOL USE: ICD-10-CM

## 2022-10-17 LAB
ALBUMIN SERPL-MCNC: 5.4 G/DL (ref 3.5–5.2)
ALBUMIN/GLOB SERPL: 1.6 G/DL
ALP SERPL-CCNC: 72 U/L (ref 39–117)
ALT SERPL W P-5'-P-CCNC: 16 U/L (ref 1–41)
AMPHET+METHAMPHET UR QL: NEGATIVE
AMPHETAMINES UR QL: NEGATIVE
ANION GAP SERPL CALCULATED.3IONS-SCNC: 16 MMOL/L (ref 5–15)
AST SERPL-CCNC: 13 U/L (ref 1–40)
BACTERIA UR QL AUTO: NORMAL /HPF
BARBITURATES UR QL SCN: NEGATIVE
BASOPHILS # BLD AUTO: 0 10*3/MM3 (ref 0–0.2)
BASOPHILS NFR BLD AUTO: 0 % (ref 0–1.5)
BENZODIAZ UR QL SCN: NEGATIVE
BILIRUB SERPL-MCNC: 1.3 MG/DL (ref 0–1.2)
BILIRUB UR QL STRIP: NEGATIVE
BUN SERPL-MCNC: 26 MG/DL (ref 6–20)
BUN/CREAT SERPL: 35.1 (ref 7–25)
BUPRENORPHINE SERPL-MCNC: NEGATIVE NG/ML
CALCIUM SPEC-SCNC: 10.4 MG/DL (ref 8.6–10.5)
CANNABINOIDS SERPL QL: POSITIVE
CHLORIDE SERPL-SCNC: 89 MMOL/L (ref 98–107)
CLARITY UR: CLEAR
CO2 SERPL-SCNC: 32 MMOL/L (ref 22–29)
COCAINE UR QL: NEGATIVE
COLOR UR: YELLOW
CREAT SERPL-MCNC: 0.74 MG/DL (ref 0.76–1.27)
DEPRECATED RDW RBC AUTO: 37.7 FL (ref 37–54)
EGFRCR SERPLBLD CKD-EPI 2021: 128.2 ML/MIN/1.73
EOSINOPHIL # BLD AUTO: 0 10*3/MM3 (ref 0–0.4)
EOSINOPHIL NFR BLD AUTO: 0 % (ref 0.3–6.2)
ERYTHROCYTE [DISTWIDTH] IN BLOOD BY AUTOMATED COUNT: 12.1 % (ref 12.3–15.4)
ETHANOL BLD-MCNC: <10 MG/DL (ref 0–10)
GLOBULIN UR ELPH-MCNC: 3.3 GM/DL
GLUCOSE SERPL-MCNC: 326 MG/DL (ref 65–99)
GLUCOSE UR STRIP-MCNC: ABNORMAL MG/DL
HCT VFR BLD AUTO: 46.9 % (ref 37.5–51)
HGB BLD-MCNC: 16.4 G/DL (ref 13–17.7)
HGB UR QL STRIP.AUTO: NEGATIVE
HOLD SPECIMEN: NORMAL
HYALINE CASTS UR QL AUTO: NORMAL /LPF
IMM GRANULOCYTES # BLD AUTO: 0.04 10*3/MM3 (ref 0–0.05)
IMM GRANULOCYTES NFR BLD AUTO: 0.4 % (ref 0–0.5)
KETONES UR QL STRIP: ABNORMAL
LEUKOCYTE ESTERASE UR QL STRIP.AUTO: NEGATIVE
LIPASE SERPL-CCNC: 13 U/L (ref 13–60)
LYMPHOCYTES # BLD AUTO: 1.04 10*3/MM3 (ref 0.7–3.1)
LYMPHOCYTES NFR BLD AUTO: 10.9 % (ref 19.6–45.3)
MCH RBC QN AUTO: 30 PG (ref 26.6–33)
MCHC RBC AUTO-ENTMCNC: 35 G/DL (ref 31.5–35.7)
MCV RBC AUTO: 85.9 FL (ref 79–97)
METHADONE UR QL SCN: NEGATIVE
MONOCYTES # BLD AUTO: 0.89 10*3/MM3 (ref 0.1–0.9)
MONOCYTES NFR BLD AUTO: 9.3 % (ref 5–12)
NEUTROPHILS NFR BLD AUTO: 7.61 10*3/MM3 (ref 1.7–7)
NEUTROPHILS NFR BLD AUTO: 79.4 % (ref 42.7–76)
NITRITE UR QL STRIP: NEGATIVE
NRBC BLD AUTO-RTO: 0 /100 WBC (ref 0–0.2)
OPIATES UR QL: NEGATIVE
OXYCODONE UR QL SCN: NEGATIVE
PCP UR QL SCN: NEGATIVE
PH UR STRIP.AUTO: 6.5 [PH] (ref 5–8)
PLATELET # BLD AUTO: 309 10*3/MM3 (ref 140–450)
PMV BLD AUTO: 10.4 FL (ref 6–12)
POTASSIUM SERPL-SCNC: 3.5 MMOL/L (ref 3.5–5.2)
PROPOXYPH UR QL: NEGATIVE
PROT SERPL-MCNC: 8.7 G/DL (ref 6–8.5)
PROT UR QL STRIP: ABNORMAL
RBC # BLD AUTO: 5.46 10*6/MM3 (ref 4.14–5.8)
RBC # UR STRIP: NORMAL /HPF
REF LAB TEST METHOD: NORMAL
SODIUM SERPL-SCNC: 137 MMOL/L (ref 136–145)
SP GR UR STRIP: 1.04 (ref 1–1.03)
SQUAMOUS #/AREA URNS HPF: NORMAL /HPF
TRICYCLICS UR QL SCN: NEGATIVE
UROBILINOGEN UR QL STRIP: ABNORMAL
WBC # UR STRIP: NORMAL /HPF
WBC NRBC COR # BLD: 9.58 10*3/MM3 (ref 3.4–10.8)
WHOLE BLOOD HOLD COAG: NORMAL
WHOLE BLOOD HOLD SPECIMEN: NORMAL

## 2022-10-17 PROCEDURE — 80053 COMPREHEN METABOLIC PANEL: CPT | Performed by: EMERGENCY MEDICINE

## 2022-10-17 PROCEDURE — 96365 THER/PROPH/DIAG IV INF INIT: CPT

## 2022-10-17 PROCEDURE — 96366 THER/PROPH/DIAG IV INF ADDON: CPT

## 2022-10-17 PROCEDURE — 25010000002 LORAZEPAM PER 2 MG: Performed by: EMERGENCY MEDICINE

## 2022-10-17 PROCEDURE — 25010000002 THIAMINE PER 100 MG: Performed by: EMERGENCY MEDICINE

## 2022-10-17 PROCEDURE — 82077 ASSAY SPEC XCP UR&BREATH IA: CPT | Performed by: EMERGENCY MEDICINE

## 2022-10-17 PROCEDURE — 99284 EMERGENCY DEPT VISIT MOD MDM: CPT

## 2022-10-17 PROCEDURE — 83690 ASSAY OF LIPASE: CPT | Performed by: EMERGENCY MEDICINE

## 2022-10-17 PROCEDURE — 85025 COMPLETE CBC W/AUTO DIFF WBC: CPT

## 2022-10-17 PROCEDURE — 74176 CT ABD & PELVIS W/O CONTRAST: CPT

## 2022-10-17 PROCEDURE — 25010000002 DROPERIDOL PER 5 MG: Performed by: EMERGENCY MEDICINE

## 2022-10-17 PROCEDURE — 96375 TX/PRO/DX INJ NEW DRUG ADDON: CPT

## 2022-10-17 PROCEDURE — 81001 URINALYSIS AUTO W/SCOPE: CPT | Performed by: EMERGENCY MEDICINE

## 2022-10-17 PROCEDURE — 80306 DRUG TEST PRSMV INSTRMNT: CPT | Performed by: EMERGENCY MEDICINE

## 2022-10-17 RX ORDER — DROPERIDOL 2.5 MG/ML
2.5 INJECTION, SOLUTION INTRAMUSCULAR; INTRAVENOUS ONCE
Status: COMPLETED | OUTPATIENT
Start: 2022-10-17 | End: 2022-10-17

## 2022-10-17 RX ORDER — SODIUM CHLORIDE 9 MG/ML
10 INJECTION INTRAVENOUS AS NEEDED
Status: DISCONTINUED | OUTPATIENT
Start: 2022-10-17 | End: 2022-10-17 | Stop reason: HOSPADM

## 2022-10-17 RX ORDER — ONDANSETRON 8 MG/1
8 TABLET, ORALLY DISINTEGRATING ORAL EVERY 8 HOURS PRN
Qty: 15 TABLET | Refills: 0 | Status: ON HOLD | OUTPATIENT
Start: 2022-10-17 | End: 2023-03-11 | Stop reason: SDUPTHER

## 2022-10-17 RX ORDER — LORAZEPAM 2 MG/ML
1 INJECTION INTRAMUSCULAR ONCE
Status: COMPLETED | OUTPATIENT
Start: 2022-10-17 | End: 2022-10-17

## 2022-10-17 RX ORDER — PANTOPRAZOLE SODIUM 40 MG/1
40 TABLET, DELAYED RELEASE ORAL 2 TIMES DAILY
Qty: 60 TABLET | Refills: 0 | Status: ON HOLD | OUTPATIENT
Start: 2022-10-17 | End: 2023-03-11 | Stop reason: SDUPTHER

## 2022-10-17 RX ADMIN — LORAZEPAM 1 MG: 2 INJECTION INTRAMUSCULAR; INTRAVENOUS at 08:35

## 2022-10-17 RX ADMIN — DROPERIDOL 2.5 MG: 2.5 INJECTION, SOLUTION INTRAMUSCULAR; INTRAVENOUS at 07:54

## 2022-10-17 RX ADMIN — THIAMINE HYDROCHLORIDE 1000 ML/HR: 100 INJECTION, SOLUTION INTRAMUSCULAR; INTRAVENOUS at 09:07

## 2022-10-17 RX ADMIN — SODIUM CHLORIDE, POTASSIUM CHLORIDE, SODIUM LACTATE AND CALCIUM CHLORIDE 1000 ML: 600; 310; 30; 20 INJECTION, SOLUTION INTRAVENOUS at 07:54

## 2022-10-17 NOTE — ED PROVIDER NOTES
Subjective   History of Present Illness  Pt is a 28 y/o M with a PMH of DM who presents for vomiting that began 3 days ago. He reports that his sxs are improved with lying flat. He reports having 1 episode of vomiting 2 days ago that was blood flecked, but has since resolved. He rates his sxs at 7/10 currently and sxs have been intermittent. He denies any abd pain, CP, SOA, cough, decreased urine output, constipation, or diarrhea. He states having similar sxs previously related to his DM noncompliance and EtOH use. He states his last EtOH was around 2 weeks ago    History provided by:  Patient      Review of Systems   Constitutional: Negative for chills and fever.   HENT: Negative.    Respiratory: Negative.  Negative for cough and shortness of breath.    Cardiovascular: Negative.  Negative for chest pain.   Gastrointestinal: Positive for nausea and vomiting. Negative for abdominal pain.   Genitourinary: Negative.  Negative for decreased urine volume.   Musculoskeletal: Negative.    Skin: Negative.    Neurological: Negative.    Psychiatric/Behavioral: Negative.    All other systems reviewed and are negative.      Past Medical History:   Diagnosis Date   • Diabetes mellitus (HCC)     PT DENIES THIS DX, PREVIOUS DOCUMENTED   • GI (gastrointestinal bleed)        No Known Allergies    History reviewed. No pertinent surgical history.    Family History   Problem Relation Age of Onset   • Diabetes Mother    • No Known Problems Father    • Colon cancer Neg Hx    • Esophageal cancer Neg Hx    • Inflammatory bowel disease Neg Hx    • Irritable bowel syndrome Neg Hx    • Ulcerative colitis Neg Hx        Social History     Socioeconomic History   • Marital status: Single   Tobacco Use   • Smoking status: Former   • Smokeless tobacco: Never   Vaping Use   • Vaping Use: Never used   Substance and Sexual Activity   • Alcohol use: Yes     Comment: SOCIALLY   • Drug use: No   • Sexual activity: Yes     Partners: Female            Objective   Physical Exam  Vitals and nursing note reviewed.   Constitutional:       Appearance: Normal appearance. He is not ill-appearing or toxic-appearing.   HENT:      Head: Normocephalic and atraumatic.      Mouth/Throat:      Mouth: Mucous membranes are dry.   Cardiovascular:      Rate and Rhythm: Regular rhythm. Tachycardia present.   Pulmonary:      Effort: Pulmonary effort is normal. No respiratory distress.      Breath sounds: Normal breath sounds. No wheezing.   Abdominal:      General: Abdomen is flat. Bowel sounds are normal.      Palpations: Abdomen is soft.      Tenderness: There is no abdominal tenderness.   Musculoskeletal:         General: Normal range of motion.      Cervical back: Normal range of motion.   Skin:     General: Skin is warm and dry.   Neurological:      General: No focal deficit present.      Mental Status: He is alert and oriented to person, place, and time.   Psychiatric:         Mood and Affect: Mood normal.         Behavior: Behavior normal.         Procedures           ED Course  ED Course as of 10/17/22 1536   Mon Oct 17, 2022   0706 Heart Rate(!): 130 [RS]   0725 Anion Gap(!): 16.0 [RS]   0854 Lipase: 13 [RS]   1114 THC Screen, Urine(!): Positive [RS]   1115 Patient is resting comfortably.  No emergent findings in her evaluation here in the ER.  Patient does have fatty liver changes as well as sludge within the gallbladder.  No lab abnormalities or imaging changes to suggest acute cholecystitis or obstruction.  Will discharge with close follow-up with gastroenterology with return to the ER for concerns.  Patient strongly encouraged to discontinue alcohol use. I had a discussion with the patient/family regarding diagnosis, diagnostic results, treatment plan, and medications.  The patient/family indicated understanding of these instructions.  I spent adequate time at the bedside prior to discharge necessary to discuss the aftercare instructions, giving patient  education, providing explanations of the results of our evaluations/findings, and my decision making to assure that the patient/family understand the plan of care.  Time was allotted to answer questions at that time and throughout the ED course.  Emphasis was placed on timely follow-up after discharge.  I also discussed the potential for the development of an acute emergent condition requiring further evaluation, return to the ER, admission, or even surgical intervention. I discussed that we found nothing during the visit today indicating the need for further ER workup at this time, admission to the hospital, or the presence of an acute unstable medical condition.  I encouraged the patient to return to the emergency department immediately for ANY concerns, worsening, new complaints, or if symptoms persist and unable to seek follow-up in a timely fashion.  The patient/family expressed understanding and agreement with this plan.  [RS]      ED Course User Index  [RS] Bharathi So MD                                           MDM  Number of Diagnoses or Management Options  Alcohol use  Fatty liver due to alcoholism  Marijuana use  Nausea and vomiting, unspecified vomiting type  Sludge in gallbladder  Diagnosis management comments: Recent Results (from the past 24 hour(s))  -Comprehensive Metabolic Panel:   Collection Time: 10/17/22  6:40 AM  Specimen: Blood       Result                      Value             Ref Range           Glucose                     326 (H)           65 - 99 mg/dL       BUN                         26 (H)            6 - 20 mg/dL        Creatinine                  0.74 (L)          0.76 - 1.27 *       Sodium                      137               136 - 145 mm*       Potassium                   3.5               3.5 - 5.2 mm*       Chloride                    89 (L)            98 - 107 mmo*       CO2                         32.0 (H)          22.0 - 29.0 *       Calcium                      10.4              8.6 - 10.5 m*       Total Protein               8.7 (H)           6.0 - 8.5 g/*       Albumin                     5.40 (H)          3.50 - 5.20 *       ALT (SGPT)                  16                1 - 41 U/L          AST (SGOT)                  13                1 - 40 U/L          Alkaline Phosphatase        72                39 - 117 U/L        Total Bilirubin             1.3 (H)           0.0 - 1.2 mg*       Globulin                    3.3               gm/dL               A/G Ratio                   1.6               g/dL                BUN/Creatinine Ratio        35.1 (H)          7.0 - 25.0          Anion Gap                   16.0 (H)          5.0 - 15.0 m*       eGFR                        128.2             >60.0 mL/min*  -Lipase:   Collection Time: 10/17/22  6:40 AM  Specimen: Blood       Result                      Value             Ref Range           Lipase                      13                13 - 60 U/L    -Green Top (Gel):   Collection Time: 10/17/22  6:40 AM       Result                      Value             Ref Range           Extra Tube                                                    Hold for add-ons.  -Lavender Top:   Collection Time: 10/17/22  6:40 AM       Result                      Value             Ref Range           Extra Tube                                                    hold for add-on  -Gold Top - SST:   Collection Time: 10/17/22  6:40 AM       Result                      Value             Ref Range           Extra Tube                                                    Hold for add-ons.  -Gray Top:   Collection Time: 10/17/22  6:40 AM       Result                      Value             Ref Range           Extra Tube                                                    Hold for add-ons.  -Light Blue Top:   Collection Time: 10/17/22  6:40 AM       Result                      Value             Ref Range           Extra Tube                                                     Hold for add-ons.  -CBC Auto Differential:   Collection Time: 10/17/22  6:40 AM  Specimen: Blood       Result                      Value             Ref Range           WBC                         9.58              3.40 - 10.80*       RBC                         5.46              4.14 - 5.80 *       Hemoglobin                  16.4              13.0 - 17.7 *       Hematocrit                  46.9              37.5 - 51.0 %       MCV                         85.9              79.0 - 97.0 *       MCH                         30.0              26.6 - 33.0 *       MCHC                        35.0              31.5 - 35.7 *       RDW                         12.1 (L)          12.3 - 15.4 %       RDW-SD                      37.7              37.0 - 54.0 *       MPV                         10.4              6.0 - 12.0 fL       Platelets                   309               140 - 450 10*       Neutrophil %                79.4 (H)          42.7 - 76.0 %       Lymphocyte %                10.9 (L)          19.6 - 45.3 %       Monocyte %                  9.3               5.0 - 12.0 %        Eosinophil %                0.0 (L)           0.3 - 6.2 %         Basophil %                  0.0               0.0 - 1.5 %         Immature Grans %            0.4               0.0 - 0.5 %         Neutrophils, Absolute       7.61 (H)          1.70 - 7.00 *       Lymphocytes, Absolute       1.04              0.70 - 3.10 *       Monocytes, Absolute         0.89              0.10 - 0.90 *       Eosinophils, Absolute       0.00              0.00 - 0.40 *       Basophils, Absolute         0.00              0.00 - 0.20 *       Immature Grans, Absolu*     0.04              0.00 - 0.05 *       nRBC                        0.0               0.0 - 0.2 /1*  -Ethanol:   Collection Time: 10/17/22  6:40 AM  Specimen: Blood       Result                      Value             Ref Range           Ethanol                     <10               0 - 10  mg/dL   -Urinalysis With Microscopic If Indicated (No Culture) - Urine, Clean Catch:   Collection Time: 10/17/22  9:57 AM  Specimen: Urine, Clean Catch       Result                      Value             Ref Range           Color, UA                   Yellow            Yellow, Straw       Appearance, UA              Clear             Clear               pH, UA                      6.5               5.0 - 8.0           Specific Gravity, UA        1.040 (H)         1.001 - 1.030       Glucose, UA                                   Negative        >=1000 mg/dL (3+) (A)       Ketones, UA                                   Negative        80 mg/dL (3+) (A)       Bilirubin, UA               Negative          Negative            Blood, UA                   Negative          Negative            Protein, UA                                   Negative        >=300 mg/dL (3+) (A)       Leuk Esterase, UA           Negative          Negative            Nitrite, UA                 Negative          Negative            Urobilinogen, UA            1.0 E.U./dL       0.2 - 1.0 E.*  -Urine Drug Screen - Urine, Clean Catch:   Collection Time: 10/17/22  9:57 AM  Specimen: Urine, Clean Catch       Result                      Value             Ref Range           THC, Screen, Urine          Positive (A)      Negative            Phencyclidine (PCP), U*     Negative          Negative            Cocaine Screen, Urine       Negative          Negative            Methamphetamine, Ur         Negative          Negative            Opiate Screen               Negative          Negative            Amphetamine Screen, Ur*     Negative          Negative            Benzodiazepine Screen,*     Negative          Negative            Tricyclic Antidepressa*     Negative          Negative            Methadone Screen, Urine     Negative          Negative            Barbiturates Screen, U*     Negative          Negative            Oxycodone Screen, Urine      Negative          Negative            Propoxyphene Screen         Negative          Negative            Buprenorphine, Screen,*     Negative          Negative       -Urinalysis, Microscopic Only - Urine, Clean Catch:   Collection Time: 10/17/22  9:57 AM  Specimen: Urine, Clean Catch       Result                      Value             Ref Range           RBC, UA                     0-2               None Seen, 0*       WBC, UA                     0-2               None Seen, 0*       Bacteria, UA                None Seen         None Seen, T*       Squamous Epithelial Ce*     0-2               None Seen, 0*       Hyaline Casts, UA           0-6               0 - 6 /LPF          Methodology                                                   Automated Microscopy  Note: In addition to lab results from this visit, the labs listed above may include labs taken at another facility or during a different encounter within the last 24 hours. Please correlate lab times with ED admission and discharge times for further clarification of the services performed during this visit.    CT Abdomen Pelvis Without Contrast   Final Result         1. Hepatic steatosis    2. Suspected stones or sludge within the gallbladder.    3. Diverticulosis without evidence of diverticulitis.              This report was finalized on 10/17/2022 9:45 AM by Feliz Schaeffer.          -----------------------------------------------------------               10/17/22     10/17/22  10/17/22  10/17/22                  0635         0858      1102      1115     -----------------------------------------------------------   BP:       (!) 155/121     134/93    141/99               BP Location:    Left arm                                     Patient Position:    Sitting                                      Pulse:      (!) 130        116        85        85       Resp:          18                                         "Temp:   98.6 °F (37 °C)                                  TempSrc:      Oral                                       SpO2:         98%          98%       98%       98%       Weight: 72.6 kg (160 lb)                                 Height:  180.3 cm (71\")                                 -----------------------------------------------------------  Medications  thiamine (B-1) 100 mg, folic acid 1 mg in sodium chloride 0.9 % 1,000 mL infusion (0 mL/hr Intravenous Stopped 10/17/22 1114)  droperidol (INAPSINE) injection 2.5 mg (2.5 mg Intravenous Given 10/17/22 0754)  lactated ringers bolus 1,000 mL (0 mL Intravenous Stopped 10/17/22 0907)  LORazepam (ATIVAN) injection 1 mg (1 mg Intravenous Given 10/17/22 0835)  ECG/EMG Results (last 24 hours)     ** No results found for the last 24 hours. **      No orders to display         Amount and/or Complexity of Data Reviewed  Clinical lab tests: reviewed  Tests in the radiology section of CPT®: reviewed  Independent visualization of images, tracings, or specimens: yes        Final diagnoses:   Nausea and vomiting, unspecified vomiting type   Alcohol use   Fatty liver due to alcoholism   Sludge in gallbladder   Marijuana use       ED Disposition  ED Disposition     ED Disposition   Discharge    Condition   Stable    Comment   --             Shalini Goodson, APRN  [address] [address]  [phone]    Schedule an appointment as soon as possible for a visit       [facility] Emergency Department  [address] [phone]    As needed, If symptoms worsen or ANY concerns.    Brunner, Mark I, MD  [address] [address]  [phone]      As soon as possible.         Medication List      New Prescriptions    hyoscyamine 0.125 MG SL tablet  Commonly known as: LEVSIN  Take 1 tablet by mouth Every 4 (Four) Hours As Needed for Cramping.     ondansetron ODT " 8 MG disintegrating tablet  Commonly known as: ZOFRAN-ODT  Place 1 tablet on the tongue Every 8 (Eight) Hours As Needed for Nausea or Vomiting.     pantoprazole 40 MG EC tablet  Commonly known as: PROTONIX  Take 1 tablet by mouth 2 (Two) Times a Day.        Stop    omeprazole 20 MG capsule  Commonly known as: priLOSEC     promethazine 25 MG tablet  Commonly known as: PHENERGAN           Where to Get Your Medications      These medications were sent to [id number] - [place], [place] - [address] [place] [address]  AT [facility] - [phone]  - [phone] FX  [address] [facility] , [facility] [address]    Phone: [phone]   · hyoscyamine 0.125 MG SL tablet  · ondansetron ODT 8 MG disintegrating tablet  · pantoprazole 40 MG EC tablet          Bharathi So MD  10/17/22 3888

## 2022-11-18 ENCOUNTER — TELEPHONE (OUTPATIENT)
Dept: INTERNAL MEDICINE | Facility: CLINIC | Age: 27
End: 2022-11-18

## 2022-11-18 NOTE — TELEPHONE ENCOUNTER
DR ROMERO OFFICE CALLED REQUESTING PT'S LAST A1C. WASN'T SURE WHAT TO FAX.    DR VAN'S FAX: [phone]

## 2023-03-09 ENCOUNTER — HOSPITAL ENCOUNTER (INPATIENT)
Facility: HOSPITAL | Age: 28
LOS: 2 days | Discharge: HOME | DRG: 639 | End: 2023-03-11
Attending: EMERGENCY MEDICINE | Admitting: FAMILY MEDICINE
Payer: MEDICAID

## 2023-03-09 ENCOUNTER — APPOINTMENT (OUTPATIENT)
Dept: GENERAL RADIOLOGY | Facility: HOSPITAL | Age: 28
DRG: 639 | End: 2023-03-09
Payer: MEDICAID

## 2023-03-09 ENCOUNTER — APPOINTMENT (OUTPATIENT)
Dept: CT IMAGING | Facility: HOSPITAL | Age: 28
DRG: 639 | End: 2023-03-09
Payer: MEDICAID

## 2023-03-09 DIAGNOSIS — E10.10 DIABETIC KETOACIDOSIS WITHOUT COMA ASSOCIATED WITH TYPE 1 DIABETES MELLITUS: Primary | ICD-10-CM

## 2023-03-09 DIAGNOSIS — Z91.199 NONCOMPLIANCE: ICD-10-CM

## 2023-03-09 DIAGNOSIS — E11.65 TYPE 2 DIABETES MELLITUS WITH HYPERGLYCEMIA, WITH LONG-TERM CURRENT USE OF INSULIN: ICD-10-CM

## 2023-03-09 DIAGNOSIS — R11.2 NAUSEA AND VOMITING, UNSPECIFIED VOMITING TYPE: ICD-10-CM

## 2023-03-09 DIAGNOSIS — F12.90 MARIJUANA USE: ICD-10-CM

## 2023-03-09 DIAGNOSIS — Z79.4 TYPE 2 DIABETES MELLITUS WITH HYPERGLYCEMIA, WITH LONG-TERM CURRENT USE OF INSULIN: ICD-10-CM

## 2023-03-09 PROBLEM — E87.20 LACTIC ACIDOSIS: Status: ACTIVE | Noted: 2023-03-09

## 2023-03-09 PROBLEM — E83.42 HYPOMAGNESEMIA: Status: ACTIVE | Noted: 2023-03-09

## 2023-03-09 PROBLEM — E11.10 DIABETIC KETOACIDOSIS ASSOCIATED WITH TYPE 2 DIABETES MELLITUS: Status: ACTIVE | Noted: 2023-03-09

## 2023-03-09 LAB
ALBUMIN SERPL-MCNC: 4.8 G/DL (ref 3.5–5.2)
ALBUMIN SERPL-MCNC: 5.3 G/DL (ref 3.5–5.2)
ALBUMIN/GLOB SERPL: 1.7 G/DL
ALBUMIN/GLOB SERPL: 1.8 G/DL
ALP SERPL-CCNC: 103 U/L (ref 39–117)
ALP SERPL-CCNC: 87 U/L (ref 39–117)
ALT SERPL W P-5'-P-CCNC: 28 U/L (ref 1–41)
ALT SERPL W P-5'-P-CCNC: 30 U/L (ref 1–41)
AMPHET+METHAMPHET UR QL: NEGATIVE
AMPHETAMINES UR QL: NEGATIVE
ANION GAP SERPL CALCULATED.3IONS-SCNC: 19 MMOL/L (ref 5–15)
ANION GAP SERPL CALCULATED.3IONS-SCNC: 23 MMOL/L (ref 5–15)
AST SERPL-CCNC: 15 U/L (ref 1–40)
AST SERPL-CCNC: 17 U/L (ref 1–40)
ATMOSPHERIC PRESS: ABNORMAL MM[HG]
B-OH-BUTYR SERPL-SCNC: 1.88 MMOL/L (ref 0.02–0.27)
BACTERIA UR QL AUTO: NORMAL /HPF
BARBITURATES UR QL SCN: NEGATIVE
BASE EXCESS BLDV CALC-SCNC: 0.3 MMOL/L (ref -2–2)
BASOPHILS # BLD AUTO: 0.01 10*3/MM3 (ref 0–0.2)
BASOPHILS # BLD AUTO: 0.01 10*3/MM3 (ref 0–0.2)
BASOPHILS NFR BLD AUTO: 0.1 % (ref 0–1.5)
BASOPHILS NFR BLD AUTO: 0.1 % (ref 0–1.5)
BDY SITE: ABNORMAL
BENZODIAZ UR QL SCN: NEGATIVE
BILIRUB SERPL-MCNC: 0.5 MG/DL (ref 0–1.2)
BILIRUB SERPL-MCNC: 0.6 MG/DL (ref 0–1.2)
BILIRUB UR QL STRIP: NEGATIVE
BODY TEMPERATURE: 37 C
BUN SERPL-MCNC: 13 MG/DL (ref 6–20)
BUN SERPL-MCNC: 15 MG/DL (ref 6–20)
BUN/CREAT SERPL: 16 (ref 7–25)
BUN/CREAT SERPL: 20.8 (ref 7–25)
BUPRENORPHINE SERPL-MCNC: NEGATIVE NG/ML
CALCIUM SPEC-SCNC: 10.1 MG/DL (ref 8.6–10.5)
CALCIUM SPEC-SCNC: 9 MG/DL (ref 8.6–10.5)
CANNABINOIDS SERPL QL: POSITIVE
CHLORIDE SERPL-SCNC: 94 MMOL/L (ref 98–107)
CHLORIDE SERPL-SCNC: 97 MMOL/L (ref 98–107)
CLARITY UR: CLEAR
CO2 BLDA-SCNC: 25.1 MMOL/L (ref 22–33)
CO2 SERPL-SCNC: 21 MMOL/L (ref 22–29)
CO2 SERPL-SCNC: 21 MMOL/L (ref 22–29)
COCAINE UR QL: NEGATIVE
COHGB MFR BLD: 1.1 %
COLOR UR: YELLOW
CREAT SERPL-MCNC: 0.72 MG/DL (ref 0.76–1.27)
CREAT SERPL-MCNC: 0.81 MG/DL (ref 0.76–1.27)
D-LACTATE SERPL-SCNC: 1.9 MMOL/L (ref 0.5–2)
D-LACTATE SERPL-SCNC: 5.2 MMOL/L (ref 0.5–2)
D-LACTATE SERPL-SCNC: 5.4 MMOL/L (ref 0.5–2)
DEPRECATED RDW RBC AUTO: 35.7 FL (ref 37–54)
DEPRECATED RDW RBC AUTO: 36.1 FL (ref 37–54)
EGFRCR SERPLBLD CKD-EPI 2021: 123.9 ML/MIN/1.73
EGFRCR SERPLBLD CKD-EPI 2021: 128.4 ML/MIN/1.73
EOSINOPHIL # BLD AUTO: 0 10*3/MM3 (ref 0–0.4)
EOSINOPHIL # BLD AUTO: 0 10*3/MM3 (ref 0–0.4)
EOSINOPHIL NFR BLD AUTO: 0 % (ref 0.3–6.2)
EOSINOPHIL NFR BLD AUTO: 0 % (ref 0.3–6.2)
EPAP: 0
ERYTHROCYTE [DISTWIDTH] IN BLOOD BY AUTOMATED COUNT: 11.9 % (ref 12.3–15.4)
ERYTHROCYTE [DISTWIDTH] IN BLOOD BY AUTOMATED COUNT: 11.9 % (ref 12.3–15.4)
GLOBULIN UR ELPH-MCNC: 2.8 GM/DL
GLOBULIN UR ELPH-MCNC: 3 GM/DL
GLUCOSE BLDC GLUCOMTR-MCNC: 220 MG/DL (ref 70–130)
GLUCOSE BLDC GLUCOMTR-MCNC: 226 MG/DL (ref 70–130)
GLUCOSE BLDC GLUCOMTR-MCNC: 230 MG/DL (ref 70–130)
GLUCOSE BLDC GLUCOMTR-MCNC: 246 MG/DL (ref 70–130)
GLUCOSE BLDC GLUCOMTR-MCNC: 324 MG/DL (ref 70–130)
GLUCOSE SERPL-MCNC: 343 MG/DL (ref 65–99)
GLUCOSE SERPL-MCNC: 376 MG/DL (ref 65–99)
GLUCOSE UR STRIP-MCNC: ABNORMAL MG/DL
HBA1C MFR BLD: 11.7 % (ref 4.8–5.6)
HCO3 BLDV-SCNC: 24 MMOL/L (ref 22–28)
HCT VFR BLD AUTO: 43.8 % (ref 37.5–51)
HCT VFR BLD AUTO: 45.8 % (ref 37.5–51)
HGB BLD-MCNC: 15.5 G/DL (ref 13–17.7)
HGB BLD-MCNC: 16.5 G/DL (ref 13–17.7)
HGB BLDA-MCNC: 16.7 G/DL (ref 13.5–17.5)
HGB UR QL STRIP.AUTO: NEGATIVE
HOLD SPECIMEN: NORMAL
HYALINE CASTS UR QL AUTO: NORMAL /LPF
IMM GRANULOCYTES # BLD AUTO: 0.04 10*3/MM3 (ref 0–0.05)
IMM GRANULOCYTES # BLD AUTO: 0.09 10*3/MM3 (ref 0–0.05)
IMM GRANULOCYTES NFR BLD AUTO: 0.4 % (ref 0–0.5)
IMM GRANULOCYTES NFR BLD AUTO: 0.7 % (ref 0–0.5)
INHALED O2 CONCENTRATION: 21 %
IPAP: 0
KETONES UR QL STRIP: ABNORMAL
LEUKOCYTE ESTERASE UR QL STRIP.AUTO: NEGATIVE
LIPASE SERPL-CCNC: 10 U/L (ref 13–60)
LYMPHOCYTES # BLD AUTO: 0.2 10*3/MM3 (ref 0.7–3.1)
LYMPHOCYTES # BLD AUTO: 0.21 10*3/MM3 (ref 0.7–3.1)
LYMPHOCYTES NFR BLD AUTO: 1.6 % (ref 19.6–45.3)
LYMPHOCYTES NFR BLD AUTO: 1.9 % (ref 19.6–45.3)
MAGNESIUM SERPL-MCNC: 1.4 MG/DL (ref 1.6–2.6)
MAGNESIUM SERPL-MCNC: 1.5 MG/DL (ref 1.6–2.6)
MAGNESIUM SERPL-MCNC: 1.6 MG/DL (ref 1.6–2.6)
MCH RBC QN AUTO: 29.6 PG (ref 26.6–33)
MCH RBC QN AUTO: 29.8 PG (ref 26.6–33)
MCHC RBC AUTO-ENTMCNC: 35.4 G/DL (ref 31.5–35.7)
MCHC RBC AUTO-ENTMCNC: 36 G/DL (ref 31.5–35.7)
MCV RBC AUTO: 82.2 FL (ref 79–97)
MCV RBC AUTO: 84.2 FL (ref 79–97)
METHADONE UR QL SCN: NEGATIVE
METHGB BLD QL: 0.6 %
MODALITY: ABNORMAL
MONOCYTES # BLD AUTO: 0.14 10*3/MM3 (ref 0.1–0.9)
MONOCYTES # BLD AUTO: 0.18 10*3/MM3 (ref 0.1–0.9)
MONOCYTES NFR BLD AUTO: 1.3 % (ref 5–12)
MONOCYTES NFR BLD AUTO: 1.3 % (ref 5–12)
NEUTROPHILS NFR BLD AUTO: 10.15 10*3/MM3 (ref 1.7–7)
NEUTROPHILS NFR BLD AUTO: 12.91 10*3/MM3 (ref 1.7–7)
NEUTROPHILS NFR BLD AUTO: 96.3 % (ref 42.7–76)
NEUTROPHILS NFR BLD AUTO: 96.3 % (ref 42.7–76)
NITRITE UR QL STRIP: NEGATIVE
NOTE: ABNORMAL
NRBC BLD AUTO-RTO: 0 /100 WBC (ref 0–0.2)
NRBC BLD AUTO-RTO: 0 /100 WBC (ref 0–0.2)
OPIATES UR QL: NEGATIVE
OSMOLALITY SERPL: 312 MOSM/KG (ref 275–295)
OXYCODONE UR QL SCN: NEGATIVE
OXYHGB MFR BLDV: 56.1 %
PAW @ PEAK INSP FLOW SETTING VENT: 0 CMH2O
PCO2 BLDV: 35.6 MM HG (ref 41–51)
PCP UR QL SCN: NEGATIVE
PH BLDV: 7.44 PH UNITS (ref 7.31–7.41)
PH UR STRIP.AUTO: 5.5 [PH] (ref 5–8)
PHOSPHATE SERPL-MCNC: 3.9 MG/DL (ref 2.5–4.5)
PHOSPHATE SERPL-MCNC: 4.3 MG/DL (ref 2.5–4.5)
PLATELET # BLD AUTO: 222 10*3/MM3 (ref 140–450)
PLATELET # BLD AUTO: 236 10*3/MM3 (ref 140–450)
PMV BLD AUTO: 10.6 FL (ref 6–12)
PMV BLD AUTO: 10.8 FL (ref 6–12)
PO2 BLDV: 28.5 MM HG (ref 27–53)
POTASSIUM SERPL-SCNC: 3.8 MMOL/L (ref 3.5–5.2)
POTASSIUM SERPL-SCNC: 4.2 MMOL/L (ref 3.5–5.2)
PROPOXYPH UR QL: NEGATIVE
PROT SERPL-MCNC: 7.6 G/DL (ref 6–8.5)
PROT SERPL-MCNC: 8.3 G/DL (ref 6–8.5)
PROT UR QL STRIP: ABNORMAL
RBC # BLD AUTO: 5.2 10*6/MM3 (ref 4.14–5.8)
RBC # BLD AUTO: 5.57 10*6/MM3 (ref 4.14–5.8)
RBC # UR STRIP: NORMAL /HPF
REF LAB TEST METHOD: NORMAL
SODIUM SERPL-SCNC: 137 MMOL/L (ref 136–145)
SODIUM SERPL-SCNC: 138 MMOL/L (ref 136–145)
SP GR UR STRIP: 1.07 (ref 1–1.03)
SQUAMOUS #/AREA URNS HPF: NORMAL /HPF
TOTAL RATE: 0 BREATHS/MINUTE
TRICYCLICS UR QL SCN: NEGATIVE
UROBILINOGEN UR QL STRIP: ABNORMAL
WBC # UR STRIP: NORMAL /HPF
WBC NRBC COR # BLD: 10.54 10*3/MM3 (ref 3.4–10.8)
WBC NRBC COR # BLD: 13.4 10*3/MM3 (ref 3.4–10.8)
WHOLE BLOOD HOLD COAG: NORMAL
WHOLE BLOOD HOLD SPECIMEN: NORMAL

## 2023-03-09 PROCEDURE — 25010000002 PROCHLORPERAZINE 10 MG/2ML SOLUTION: Performed by: PHYSICIAN ASSISTANT

## 2023-03-09 PROCEDURE — 82805 BLOOD GASES W/O2 SATURATION: CPT

## 2023-03-09 PROCEDURE — 25510000001 IOPAMIDOL 61 % SOLUTION: Performed by: EMERGENCY MEDICINE

## 2023-03-09 PROCEDURE — 80053 COMPREHEN METABOLIC PANEL: CPT | Performed by: PHYSICIAN ASSISTANT

## 2023-03-09 PROCEDURE — 82010 KETONE BODYS QUAN: CPT | Performed by: PHYSICIAN ASSISTANT

## 2023-03-09 PROCEDURE — 83735 ASSAY OF MAGNESIUM: CPT | Performed by: NURSE PRACTITIONER

## 2023-03-09 PROCEDURE — 83690 ASSAY OF LIPASE: CPT | Performed by: PHYSICIAN ASSISTANT

## 2023-03-09 PROCEDURE — 99285 EMERGENCY DEPT VISIT HI MDM: CPT

## 2023-03-09 PROCEDURE — 25010000002 ONDANSETRON PER 1 MG: Performed by: NURSE PRACTITIONER

## 2023-03-09 PROCEDURE — 99223 1ST HOSP IP/OBS HIGH 75: CPT | Performed by: INTERNAL MEDICINE

## 2023-03-09 PROCEDURE — 82962 GLUCOSE BLOOD TEST: CPT

## 2023-03-09 PROCEDURE — 25010000002 ONDANSETRON PER 1 MG: Performed by: PHYSICIAN ASSISTANT

## 2023-03-09 PROCEDURE — 80306 DRUG TEST PRSMV INSTRMNT: CPT | Performed by: PHYSICIAN ASSISTANT

## 2023-03-09 PROCEDURE — 81001 URINALYSIS AUTO W/SCOPE: CPT | Performed by: PHYSICIAN ASSISTANT

## 2023-03-09 PROCEDURE — 84100 ASSAY OF PHOSPHORUS: CPT | Performed by: PHYSICIAN ASSISTANT

## 2023-03-09 PROCEDURE — 83930 ASSAY OF BLOOD OSMOLALITY: CPT | Performed by: PHYSICIAN ASSISTANT

## 2023-03-09 PROCEDURE — 85025 COMPLETE CBC W/AUTO DIFF WBC: CPT | Performed by: PHYSICIAN ASSISTANT

## 2023-03-09 PROCEDURE — 83735 ASSAY OF MAGNESIUM: CPT | Performed by: PHYSICIAN ASSISTANT

## 2023-03-09 PROCEDURE — 71046 X-RAY EXAM CHEST 2 VIEWS: CPT

## 2023-03-09 PROCEDURE — 83036 HEMOGLOBIN GLYCOSYLATED A1C: CPT | Performed by: PHYSICIAN ASSISTANT

## 2023-03-09 PROCEDURE — 83605 ASSAY OF LACTIC ACID: CPT | Performed by: PHYSICIAN ASSISTANT

## 2023-03-09 PROCEDURE — 25010000002 ENOXAPARIN PER 10 MG: Performed by: NURSE PRACTITIONER

## 2023-03-09 PROCEDURE — 25010000002 MAGNESIUM SULFATE 2 GM/50ML SOLUTION: Performed by: INTERNAL MEDICINE

## 2023-03-09 PROCEDURE — 74177 CT ABD & PELVIS W/CONTRAST: CPT

## 2023-03-09 RX ORDER — ACETAMINOPHEN 160 MG/5ML
650 SOLUTION ORAL EVERY 4 HOURS PRN
Status: DISCONTINUED | OUTPATIENT
Start: 2023-03-09 | End: 2023-03-11 | Stop reason: HOSPADM

## 2023-03-09 RX ORDER — ENOXAPARIN SODIUM 100 MG/ML
40 INJECTION SUBCUTANEOUS DAILY
Status: DISCONTINUED | OUTPATIENT
Start: 2023-03-09 | End: 2023-03-11 | Stop reason: HOSPADM

## 2023-03-09 RX ORDER — SODIUM CHLORIDE 0.9 % (FLUSH) 0.9 %
10 SYRINGE (ML) INJECTION AS NEEDED
Status: DISCONTINUED | OUTPATIENT
Start: 2023-03-09 | End: 2023-03-11 | Stop reason: HOSPADM

## 2023-03-09 RX ORDER — PROCHLORPERAZINE EDISYLATE 5 MG/ML
10 INJECTION INTRAMUSCULAR; INTRAVENOUS ONCE
Status: COMPLETED | OUTPATIENT
Start: 2023-03-09 | End: 2023-03-09

## 2023-03-09 RX ORDER — MAGNESIUM SULFATE HEPTAHYDRATE 40 MG/ML
2 INJECTION, SOLUTION INTRAVENOUS
Status: COMPLETED | OUTPATIENT
Start: 2023-03-09 | End: 2023-03-10

## 2023-03-09 RX ORDER — IBUPROFEN 600 MG/1
1 TABLET ORAL
Status: DISCONTINUED | OUTPATIENT
Start: 2023-03-09 | End: 2023-03-10

## 2023-03-09 RX ORDER — ONDANSETRON 2 MG/ML
4 INJECTION INTRAMUSCULAR; INTRAVENOUS EVERY 6 HOURS PRN
Status: DISCONTINUED | OUTPATIENT
Start: 2023-03-09 | End: 2023-03-11 | Stop reason: HOSPADM

## 2023-03-09 RX ORDER — SODIUM CHLORIDE 9 MG/ML
250 INJECTION, SOLUTION INTRAVENOUS CONTINUOUS PRN
Status: DISCONTINUED | OUTPATIENT
Start: 2023-03-09 | End: 2023-03-10

## 2023-03-09 RX ORDER — ONDANSETRON 2 MG/ML
4 INJECTION INTRAMUSCULAR; INTRAVENOUS ONCE
Status: COMPLETED | OUTPATIENT
Start: 2023-03-09 | End: 2023-03-09

## 2023-03-09 RX ORDER — FAMOTIDINE 10 MG/ML
20 INJECTION, SOLUTION INTRAVENOUS ONCE
Status: COMPLETED | OUTPATIENT
Start: 2023-03-09 | End: 2023-03-09

## 2023-03-09 RX ORDER — SODIUM CHLORIDE AND POTASSIUM CHLORIDE 150; 450 MG/100ML; MG/100ML
250 INJECTION, SOLUTION INTRAVENOUS CONTINUOUS PRN
Status: DISCONTINUED | OUTPATIENT
Start: 2023-03-09 | End: 2023-03-10

## 2023-03-09 RX ORDER — DEXTROSE, SODIUM CHLORIDE, AND POTASSIUM CHLORIDE 5; .45; .3 G/100ML; G/100ML; G/100ML
150 INJECTION INTRAVENOUS CONTINUOUS PRN
Status: DISCONTINUED | OUTPATIENT
Start: 2023-03-09 | End: 2023-03-10

## 2023-03-09 RX ORDER — ONDANSETRON 4 MG/1
4 TABLET, FILM COATED ORAL EVERY 6 HOURS PRN
Status: DISCONTINUED | OUTPATIENT
Start: 2023-03-09 | End: 2023-03-11 | Stop reason: HOSPADM

## 2023-03-09 RX ORDER — DEXTROSE, SODIUM CHLORIDE, AND POTASSIUM CHLORIDE 5; .9; .15 G/100ML; G/100ML; G/100ML
150 INJECTION INTRAVENOUS CONTINUOUS PRN
Status: DISCONTINUED | OUTPATIENT
Start: 2023-03-09 | End: 2023-03-10

## 2023-03-09 RX ORDER — SODIUM CHLORIDE AND POTASSIUM CHLORIDE 300; 900 MG/100ML; MG/100ML
250 INJECTION, SOLUTION INTRAVENOUS CONTINUOUS PRN
Status: DISCONTINUED | OUTPATIENT
Start: 2023-03-09 | End: 2023-03-10

## 2023-03-09 RX ORDER — NICOTINE POLACRILEX 4 MG
15 LOZENGE BUCCAL
Status: DISCONTINUED | OUTPATIENT
Start: 2023-03-09 | End: 2023-03-10

## 2023-03-09 RX ORDER — SODIUM CHLORIDE 0.9 % (FLUSH) 0.9 %
10 SYRINGE (ML) INJECTION EVERY 12 HOURS SCHEDULED
Status: DISCONTINUED | OUTPATIENT
Start: 2023-03-09 | End: 2023-03-10

## 2023-03-09 RX ORDER — SODIUM CHLORIDE AND POTASSIUM CHLORIDE 150; 900 MG/100ML; MG/100ML
250 INJECTION, SOLUTION INTRAVENOUS CONTINUOUS PRN
Status: DISCONTINUED | OUTPATIENT
Start: 2023-03-09 | End: 2023-03-10

## 2023-03-09 RX ORDER — ACETAMINOPHEN 325 MG/1
650 TABLET ORAL EVERY 4 HOURS PRN
Status: DISCONTINUED | OUTPATIENT
Start: 2023-03-09 | End: 2023-03-11 | Stop reason: HOSPADM

## 2023-03-09 RX ORDER — ACETAMINOPHEN 650 MG/1
650 SUPPOSITORY RECTAL EVERY 4 HOURS PRN
Status: DISCONTINUED | OUTPATIENT
Start: 2023-03-09 | End: 2023-03-11 | Stop reason: HOSPADM

## 2023-03-09 RX ORDER — SODIUM CHLORIDE 9 MG/ML
40 INJECTION, SOLUTION INTRAVENOUS AS NEEDED
Status: DISCONTINUED | OUTPATIENT
Start: 2023-03-09 | End: 2023-03-11 | Stop reason: HOSPADM

## 2023-03-09 RX ORDER — CHOLECALCIFEROL (VITAMIN D3) 125 MCG
5 CAPSULE ORAL NIGHTLY PRN
Status: DISCONTINUED | OUTPATIENT
Start: 2023-03-09 | End: 2023-03-11 | Stop reason: HOSPADM

## 2023-03-09 RX ORDER — POTASSIUM CHLORIDE, DEXTROSE MONOHYDRATE AND SODIUM CHLORIDE 300; 5; 900 MG/100ML; G/100ML; MG/100ML
150 INJECTION, SOLUTION INTRAVENOUS CONTINUOUS PRN
Status: DISCONTINUED | OUTPATIENT
Start: 2023-03-09 | End: 2023-03-10

## 2023-03-09 RX ORDER — DEXTROSE AND SODIUM CHLORIDE 5; .9 G/100ML; G/100ML
150 INJECTION, SOLUTION INTRAVENOUS CONTINUOUS PRN
Status: DISCONTINUED | OUTPATIENT
Start: 2023-03-09 | End: 2023-03-10

## 2023-03-09 RX ORDER — SODIUM CHLORIDE 450 MG/100ML
250 INJECTION, SOLUTION INTRAVENOUS CONTINUOUS PRN
Status: DISCONTINUED | OUTPATIENT
Start: 2023-03-09 | End: 2023-03-10

## 2023-03-09 RX ORDER — DEXTROSE, SODIUM CHLORIDE, AND POTASSIUM CHLORIDE 5; .45; .15 G/100ML; G/100ML; G/100ML
150 INJECTION INTRAVENOUS CONTINUOUS PRN
Status: DISCONTINUED | OUTPATIENT
Start: 2023-03-09 | End: 2023-03-10

## 2023-03-09 RX ORDER — DEXTROSE MONOHYDRATE 25 G/50ML
10-50 INJECTION, SOLUTION INTRAVENOUS
Status: DISCONTINUED | OUTPATIENT
Start: 2023-03-09 | End: 2023-03-10

## 2023-03-09 RX ORDER — DEXTROSE AND SODIUM CHLORIDE 5; .45 G/100ML; G/100ML
150 INJECTION, SOLUTION INTRAVENOUS CONTINUOUS PRN
Status: DISCONTINUED | OUTPATIENT
Start: 2023-03-09 | End: 2023-03-10

## 2023-03-09 RX ADMIN — SODIUM CHLORIDE 1000 ML: 9 INJECTION, SOLUTION INTRAVENOUS at 15:56

## 2023-03-09 RX ADMIN — ONDANSETRON 4 MG: 2 INJECTION INTRAMUSCULAR; INTRAVENOUS at 23:09

## 2023-03-09 RX ADMIN — FAMOTIDINE 20 MG: 10 INJECTION INTRAVENOUS at 15:58

## 2023-03-09 RX ADMIN — IOPAMIDOL 90 ML: 612 INJECTION, SOLUTION INTRAVENOUS at 17:13

## 2023-03-09 RX ADMIN — MAGNESIUM SULFATE 2 G: 2 INJECTION INTRAVENOUS at 20:23

## 2023-03-09 RX ADMIN — INSULIN HUMAN 2.6 UNITS/HR: 1 INJECTION, SOLUTION INTRAVENOUS at 18:10

## 2023-03-09 RX ADMIN — ENOXAPARIN SODIUM 40 MG: 40 INJECTION SUBCUTANEOUS at 23:09

## 2023-03-09 RX ADMIN — PROCHLORPERAZINE EDISYLATE 10 MG: 5 INJECTION INTRAMUSCULAR; INTRAVENOUS at 16:21

## 2023-03-09 RX ADMIN — MAGNESIUM SULFATE 2 G: 2 INJECTION INTRAVENOUS at 23:09

## 2023-03-09 RX ADMIN — POTASSIUM CHLORIDE, DEXTROSE MONOHYDRATE AND SODIUM CHLORIDE 150 ML/HR: 150; 5; 450 INJECTION, SOLUTION INTRAVENOUS at 20:11

## 2023-03-09 RX ADMIN — ONDANSETRON 4 MG: 2 INJECTION INTRAMUSCULAR; INTRAVENOUS at 15:56

## 2023-03-09 RX ADMIN — Medication 10 ML: at 20:16

## 2023-03-09 RX ADMIN — SODIUM CHLORIDE 1000 ML: 9 INJECTION, SOLUTION INTRAVENOUS at 18:14

## 2023-03-09 NOTE — ED PROVIDER NOTES
Subjective   History of Present Illness  Pt is a 29 yo male presenting to ED with complaints of vomiting. PMHx significant for DM (insulin).  He reports vomiting began this morning and he denies abdominal pain or diarrhea. He explains he has not been checking his blood sugars for weeks and also has been out of insulin the last few days due to insurance issues. He did take 35 units earlier today after he started vomiting. He denies fever, cough, CP, SOB or urinary sx. He denies prior abdominal pain surgery. He last used marijuana 2 weeks ago and denies recent tobacco or ETOH use.     History provided by:  Medical records and patient      Review of Systems   Constitutional: Positive for appetite change and fatigue.   HENT: Negative for congestion.    Eyes: Negative for visual disturbance.   Respiratory: Negative for cough and shortness of breath.    Cardiovascular: Negative for chest pain.   Gastrointestinal: Positive for abdominal pain, nausea and vomiting. Negative for blood in stool and diarrhea.   Genitourinary: Negative for difficulty urinating, dysuria and flank pain.   Neurological: Negative for dizziness, syncope, speech difficulty, weakness, numbness and headaches.   Psychiatric/Behavioral: Negative for confusion.       Past Medical History:   Diagnosis Date   • Diabetes mellitus (HCC)     PT DENIES THIS DX, PREVIOUS DOCUMENTED   • GI (gastrointestinal bleed)        No Known Allergies    No past surgical history on file.    Family History   Problem Relation Age of Onset   • Diabetes Mother    • No Known Problems Father    • Colon cancer Neg Hx    • Esophageal cancer Neg Hx    • Inflammatory bowel disease Neg Hx    • Irritable bowel syndrome Neg Hx    • Ulcerative colitis Neg Hx        Social History     Socioeconomic History   • Marital status: Single   Tobacco Use   • Smoking status: Former   • Smokeless tobacco: Never   Vaping Use   • Vaping Use: Never used   Substance and Sexual Activity   • Alcohol use:  Yes     Comment: SOCIALLY   • Drug use: No   • Sexual activity: Yes     Partners: Female           Objective   Physical Exam  Vitals and nursing note reviewed.   Constitutional:       Appearance: He is well-developed.   HENT:      Head: Atraumatic.      Nose: Nose normal.   Eyes:      General: Lids are normal.      Conjunctiva/sclera: Conjunctivae normal.      Pupils: Pupils are equal, round, and reactive to light.   Cardiovascular:      Rate and Rhythm: Regular rhythm. Tachycardia present.      Heart sounds: Normal heart sounds.   Pulmonary:      Effort: Pulmonary effort is normal.      Breath sounds: Normal breath sounds.   Abdominal:      General: There is no distension.      Palpations: Abdomen is soft.      Tenderness: There is generalized abdominal tenderness. There is no right CVA tenderness, left CVA tenderness, guarding or rebound.   Musculoskeletal:         General: No tenderness or deformity. Normal range of motion.      Cervical back: Normal range of motion and neck supple.   Skin:     General: Skin is warm and dry.   Neurological:      Mental Status: He is alert and oriented to person, place, and time.      Sensory: No sensory deficit.   Psychiatric:         Behavior: Behavior normal.         Procedures           ED Course  ED Course as of 03/09/23 2141   Thu Mar 09, 2023   1602 pH, Venous(!): 7.438 [RT]   1602 pCO2, Venous(!): 35.6 [RT]   1610 WBC(!): 13.40 [RT]   1640 Glucose(!): 376 [RT]   1641 Lactate(!!): 5.4 [RT]   1641 Beta-Hydroxybutyrate Quant(!): 1.877 [RT]   1641 Magnesium(!): 1.5 [RT]      ED Course User Index  [RT] Bertha Elliott PA      Recent Results (from the past 24 hour(s))   Comprehensive Metabolic Panel    Collection Time: 03/09/23  3:55 PM    Specimen: Blood   Result Value Ref Range    Glucose 376 (H) 65 - 99 mg/dL    BUN 15 6 - 20 mg/dL    Creatinine 0.72 (L) 0.76 - 1.27 mg/dL    Sodium 138 136 - 145 mmol/L    Potassium 4.2 3.5 - 5.2 mmol/L    Chloride 94 (L) 98 - 107 mmol/L     CO2 21.0 (L) 22.0 - 29.0 mmol/L    Calcium 10.1 8.6 - 10.5 mg/dL    Total Protein 8.3 6.0 - 8.5 g/dL    Albumin 5.3 (H) 3.5 - 5.2 g/dL    ALT (SGPT) 30 1 - 41 U/L    AST (SGOT) 17 1 - 40 U/L    Alkaline Phosphatase 103 39 - 117 U/L    Total Bilirubin 0.6 0.0 - 1.2 mg/dL    Globulin 3.0 gm/dL    A/G Ratio 1.8 g/dL    BUN/Creatinine Ratio 20.8 7.0 - 25.0    Anion Gap 23.0 (H) 5.0 - 15.0 mmol/L    eGFR 128.4 >60.0 mL/min/1.73   Lipase    Collection Time: 03/09/23  3:55 PM    Specimen: Blood   Result Value Ref Range    Lipase 10 (L) 13 - 60 U/L   Lactic Acid, Plasma    Collection Time: 03/09/23  3:55 PM    Specimen: Blood   Result Value Ref Range    Lactate 5.4 (C) 0.5 - 2.0 mmol/L   Magnesium    Collection Time: 03/09/23  3:55 PM    Specimen: Blood   Result Value Ref Range    Magnesium 1.5 (L) 1.6 - 2.6 mg/dL   CBC Auto Differential    Collection Time: 03/09/23  3:55 PM    Specimen: Blood   Result Value Ref Range    WBC 13.40 (H) 3.40 - 10.80 10*3/mm3    RBC 5.57 4.14 - 5.80 10*6/mm3    Hemoglobin 16.5 13.0 - 17.7 g/dL    Hematocrit 45.8 37.5 - 51.0 %    MCV 82.2 79.0 - 97.0 fL    MCH 29.6 26.6 - 33.0 pg    MCHC 36.0 (H) 31.5 - 35.7 g/dL    RDW 11.9 (L) 12.3 - 15.4 %    RDW-SD 35.7 (L) 37.0 - 54.0 fl    MPV 10.8 6.0 - 12.0 fL    Platelets 236 140 - 450 10*3/mm3    Neutrophil % 96.3 (H) 42.7 - 76.0 %    Lymphocyte % 1.6 (L) 19.6 - 45.3 %    Monocyte % 1.3 (L) 5.0 - 12.0 %    Eosinophil % 0.0 (L) 0.3 - 6.2 %    Basophil % 0.1 0.0 - 1.5 %    Immature Grans % 0.7 (H) 0.0 - 0.5 %    Neutrophils, Absolute 12.91 (H) 1.70 - 7.00 10*3/mm3    Lymphocytes, Absolute 0.21 (L) 0.70 - 3.10 10*3/mm3    Monocytes, Absolute 0.18 0.10 - 0.90 10*3/mm3    Eosinophils, Absolute 0.00 0.00 - 0.40 10*3/mm3    Basophils, Absolute 0.01 0.00 - 0.20 10*3/mm3    Immature Grans, Absolute 0.09 (H) 0.00 - 0.05 10*3/mm3    nRBC 0.0 0.0 - 0.2 /100 WBC   Green Top (Gel)    Collection Time: 03/09/23  3:55 PM   Result Value Ref Range    Extra Tube Hold for  add-ons.    Lavender Top    Collection Time: 03/09/23  3:55 PM   Result Value Ref Range    Extra Tube hold for add-on    Gold Top - SST    Collection Time: 03/09/23  3:55 PM   Result Value Ref Range    Extra Tube Hold for add-ons.    Gray Top    Collection Time: 03/09/23  3:55 PM   Result Value Ref Range    Extra Tube Hold for add-ons.    Light Blue Top    Collection Time: 03/09/23  3:55 PM   Result Value Ref Range    Extra Tube Hold for add-ons.    Beta Hydroxybutyrate Quantitative    Collection Time: 03/09/23  3:55 PM    Specimen: Blood   Result Value Ref Range    Beta-Hydroxybutyrate Quant 1.877 (H) 0.020 - 0.270 mmol/L   Phosphorus    Collection Time: 03/09/23  3:55 PM    Specimen: Blood   Result Value Ref Range    Phosphorus 3.9 2.5 - 4.5 mg/dL   Osmolality, Serum    Collection Time: 03/09/23  3:55 PM    Specimen: Blood   Result Value Ref Range    Osmolality 312 (H) 275 - 295 mOsm/kg   Hemoglobin A1c    Collection Time: 03/09/23  3:55 PM    Specimen: Blood   Result Value Ref Range    Hemoglobin A1C 11.70 (H) 4.80 - 5.60 %   Blood Gas, Venous With Co-Ox    Collection Time: 03/09/23  3:57 PM    Specimen: Venous Blood   Result Value Ref Range    Site Nurse/Dr Draw     pH, Venous 7.438 (H) 7.310 - 7.410 pH Units    pCO2, Venous 35.6 (L) 41.0 - 51.0 mm Hg    pO2, Venous 28.5 27.0 - 53.0 mm Hg    HCO3, Venous 24.0 22.0 - 28.0 mmol/L    Base Excess, Venous 0.3 -2.0 - 2.0 mmol/L    Hemoglobin, Blood Gas 16.7 13.5 - 17.5 g/dL    Oxyhemoglobin Venous 56.1 %    Methemoglobin Venous 0.6 %    Carboxyhemoglobin Venous 1.1 %    CO2 Content 25.1 22 - 33 mmol/L    Temperature 37.0 C    Barometric Pressure for Blood Gas      Modality Room Air     FIO2 21 %    Rate 0 Breaths/minute    PIP 0 cmH2O    IPAP 0     EPAP 0     Note     Urinalysis With Culture If Indicated - Urine, Clean Catch    Collection Time: 03/09/23  5:31 PM    Specimen: Urine, Clean Catch   Result Value Ref Range    Color, UA Yellow Yellow, Straw    Appearance,  UA Clear Clear    pH, UA 5.5 5.0 - 8.0    Specific Gravity, UA 1.070 (H) 1.001 - 1.030    Glucose,  mg/dL (2+) (A) Negative    Ketones, UA >=160 mg/dL (4+) (A) Negative    Bilirubin, UA Negative Negative    Blood, UA Negative Negative    Protein,  mg/dL (2+) (A) Negative    Leuk Esterase, UA Negative Negative    Nitrite, UA Negative Negative    Urobilinogen, UA 0.2 E.U./dL 0.2 - 1.0 E.U./dL   Urine Drug Screen - Urine, Clean Catch    Collection Time: 03/09/23  5:31 PM    Specimen: Urine, Clean Catch   Result Value Ref Range    THC, Screen, Urine Positive (A) Negative    Phencyclidine (PCP), Urine Negative Negative    Cocaine Screen, Urine Negative Negative    Methamphetamine, Ur Negative Negative    Opiate Screen Negative Negative    Amphetamine Screen, Urine Negative Negative    Benzodiazepine Screen, Urine Negative Negative    Tricyclic Antidepressants Screen Negative Negative    Methadone Screen, Urine Negative Negative    Barbiturates Screen, Urine Negative Negative    Oxycodone Screen, Urine Negative Negative    Propoxyphene Screen Negative Negative    Buprenorphine, Screen, Urine Negative Negative   STAT Lactic Acid, Reflex    Collection Time: 03/09/23  5:31 PM    Specimen: Blood   Result Value Ref Range    Lactate 5.2 (C) 0.5 - 2.0 mmol/L   Comprehensive Metabolic Panel    Collection Time: 03/09/23  5:31 PM    Specimen: Blood   Result Value Ref Range    Glucose 343 (H) 65 - 99 mg/dL    BUN 13 6 - 20 mg/dL    Creatinine 0.81 0.76 - 1.27 mg/dL    Sodium 137 136 - 145 mmol/L    Potassium 3.8 3.5 - 5.2 mmol/L    Chloride 97 (L) 98 - 107 mmol/L    CO2 21.0 (L) 22.0 - 29.0 mmol/L    Calcium 9.0 8.6 - 10.5 mg/dL    Total Protein 7.6 6.0 - 8.5 g/dL    Albumin 4.8 3.5 - 5.2 g/dL    ALT (SGPT) 28 1 - 41 U/L    AST (SGOT) 15 1 - 40 U/L    Alkaline Phosphatase 87 39 - 117 U/L    Total Bilirubin 0.5 0.0 - 1.2 mg/dL    Globulin 2.8 gm/dL    A/G Ratio 1.7 g/dL    BUN/Creatinine Ratio 16.0 7.0 - 25.0    Anion Gap  19.0 (H) 5.0 - 15.0 mmol/L    eGFR 123.9 >60.0 mL/min/1.73   Magnesium    Collection Time: 03/09/23  5:31 PM    Specimen: Blood   Result Value Ref Range    Magnesium 1.4 (L) 1.6 - 2.6 mg/dL   Phosphorus    Collection Time: 03/09/23  5:31 PM    Specimen: Blood   Result Value Ref Range    Phosphorus 4.3 2.5 - 4.5 mg/dL   CBC Auto Differential    Collection Time: 03/09/23  5:31 PM    Specimen: Blood   Result Value Ref Range    WBC 10.54 3.40 - 10.80 10*3/mm3    RBC 5.20 4.14 - 5.80 10*6/mm3    Hemoglobin 15.5 13.0 - 17.7 g/dL    Hematocrit 43.8 37.5 - 51.0 %    MCV 84.2 79.0 - 97.0 fL    MCH 29.8 26.6 - 33.0 pg    MCHC 35.4 31.5 - 35.7 g/dL    RDW 11.9 (L) 12.3 - 15.4 %    RDW-SD 36.1 (L) 37.0 - 54.0 fl    MPV 10.6 6.0 - 12.0 fL    Platelets 222 140 - 450 10*3/mm3    Neutrophil % 96.3 (H) 42.7 - 76.0 %    Lymphocyte % 1.9 (L) 19.6 - 45.3 %    Monocyte % 1.3 (L) 5.0 - 12.0 %    Eosinophil % 0.0 (L) 0.3 - 6.2 %    Basophil % 0.1 0.0 - 1.5 %    Immature Grans % 0.4 0.0 - 0.5 %    Neutrophils, Absolute 10.15 (H) 1.70 - 7.00 10*3/mm3    Lymphocytes, Absolute 0.20 (L) 0.70 - 3.10 10*3/mm3    Monocytes, Absolute 0.14 0.10 - 0.90 10*3/mm3    Eosinophils, Absolute 0.00 0.00 - 0.40 10*3/mm3    Basophils, Absolute 0.01 0.00 - 0.20 10*3/mm3    Immature Grans, Absolute 0.04 0.00 - 0.05 10*3/mm3    nRBC 0.0 0.0 - 0.2 /100 WBC   Urinalysis, Microscopic Only - Urine, Clean Catch    Collection Time: 03/09/23  5:31 PM    Specimen: Urine, Clean Catch   Result Value Ref Range    RBC, UA 0-2 None Seen, 0-2 /HPF    WBC, UA None Seen None Seen, 0-2 /HPF    Bacteria, UA None Seen None Seen, Trace /HPF    Squamous Epithelial Cells, UA None Seen None Seen, 0-2 /HPF    Hyaline Casts, UA None Seen 0 - 6 /LPF    Methodology Automated Microscopy    POC Glucose Once    Collection Time: 03/09/23  6:04 PM    Specimen: Blood   Result Value Ref Range    Glucose 324 (H) 70 - 130 mg/dL   POC Glucose Once    Collection Time: 03/09/23  7:51 PM    Specimen:  "Blood   Result Value Ref Range    Glucose 230 (H) 70 - 130 mg/dL   STAT Lactic Acid, Reflex    Collection Time: 03/09/23  7:55 PM    Specimen: Blood   Result Value Ref Range    Lactate 1.9 0.5 - 2.0 mmol/L   POC Glucose Once    Collection Time: 03/09/23  8:59 PM    Specimen: Blood   Result Value Ref Range    Glucose 226 (H) 70 - 130 mg/dL     Note: In addition to lab results from this visit, the labs listed above may include labs taken at another facility or during a different encounter within the last 24 hours. Please correlate lab times with ED admission and discharge times for further clarification of the services performed during this visit.    XR Chest 2 View   Final Result   Impression:   No acute cardiopulmonary abnormality.      Electronically Signed: Delia Sunshine     3/9/2023 5:43 PM EST     Workstation ID: [id number]      CT Abdomen Pelvis With Contrast   Final Result   Impression:   No acute abdominal or pelvic abnormality is identified.      Electronically Signed: Delia Sunshine     3/9/2023 5:42 PM EST     Workstation ID: [id number]        Vitals:    03/09/23 1620 03/09/23 1632 03/09/23 1915 03/09/23 1944   BP:   142/92 135/94   BP Location:    Left arm   Patient Position:    Lying   Pulse: 97 88 117    Resp:    16   Temp:    98.7 °F (37.1 °C)   TempSrc:    Oral   SpO2: 100% 100% 99% 98%   Weight:    68.9 kg (152 lb)   Height:    180.3 cm (71\")     Medications   sodium chloride 0.9 % flush 10 mL (has no administration in time range)   sodium chloride 0.9 % flush 10 mL (10 mL Intravenous Given 3/9/23 2016)   sodium chloride 0.9 % flush 10 mL (has no administration in time range)   sodium chloride 0.9 % infusion 40 mL (has no administration in time range)   dextrose (GLUTOSE) oral gel 15 g (has no administration in time range)   dextrose (D50W) (25 g/50 mL) IV injection 10-50 mL (has no administration in time range)   glucagon (GLUCAGEN) injection 1 mg (has no administration in time range)   sodium chloride " 0.9 % infusion (has no administration in time range)   sodium chloride 0.9 % with KCl 20 mEq/L infusion (has no administration in time range)   sodium chloride 0.9 % with KCl 40 mEq/L infusion (has no administration in time range)   dextrose 5 % and sodium chloride 0.9 % infusion (has no administration in time range)   dextrose 5 % and sodium chloride 0.9 % with KCl 20 mEq/L infusion (has no administration in time range)   dextrose 5 % and sodium chloride 0.9 % with KCl 40 mEq/L infusion (has no administration in time range)   sodium chloride 0.45 % infusion (has no administration in time range)   sodium chloride 0.45 % with KCl 20 mEq/L infusion (has no administration in time range)   sodium chloride 0.45 % 1,000 mL with potassium chloride 40 mEq infusion (has no administration in time range)   dextrose 5 % and sodium chloride 0.45 % infusion (has no administration in time range)   dextrose 5 % and sodium chloride 0.45 % with KCl 20 mEq/L infusion (150 mL/hr Intravenous New Bag 3/9/23 2011)   dextrose 5 % and sodium chloride 0.45 % with KCl 40 mEq/L infusion (has no administration in time range)   insulin regular 1 unit/mL in 0.9% sodium chloride (Glucommander) (2.1 Units/hr Intravenous Rate Change (DUAL SIGN) 3/9/23 2100)   Potassium Replacement - Initiate Nurse / BPA Driven Protocol (has no administration in time range)   Magnesium Standard Dose Replacement - Initiate Nurse / BPA Driven Protocol (has no administration in time range)   Phosphorus Replacement - Initiate Nurse / BPA Driven Protocol (has no administration in time range)   magnesium sulfate 2g/50 mL (PREMIX) infusion (2 g Intravenous New Bag 3/9/23 2023)   Enoxaparin Sodium (LOVENOX) syringe 40 mg (has no administration in time range)   melatonin tablet 5 mg (has no administration in time range)   acetaminophen (TYLENOL) tablet 650 mg (has no administration in time range)     Or   acetaminophen (TYLENOL) 160 MG/5ML solution 650 mg (has no  administration in time range)     Or   acetaminophen (TYLENOL) suppository 650 mg (has no administration in time range)   ondansetron (ZOFRAN) tablet 4 mg (has no administration in time range)     Or   ondansetron (ZOFRAN) injection 4 mg (has no administration in time range)   sodium chloride 0.9 % bolus 1,000 mL (1,000 mL Intravenous New Bag 3/9/23 1556)   ondansetron (ZOFRAN) injection 4 mg (4 mg Intravenous Given 3/9/23 1556)   famotidine (PEPCID) injection 20 mg (20 mg Intravenous Given 3/9/23 1558)   prochlorperazine (COMPAZINE) injection 10 mg (10 mg Intravenous Given 3/9/23 1621)   sodium chloride 0.9 % bolus 1,000 mL (1,000 mL Intravenous New Bag 3/9/23 1814)   iopamidol (ISOVUE-300) 61 % injection 100 mL (90 mL Intravenous Given 3/9/23 1713)     ECG/EMG Results (last 24 hours)     ** No results found for the last 24 hours. **        No orders to display                                       Medical Decision Making  Pt is a 27 yo male presenting to ED with complaints of vomiting. Patient is a non compliant diabetic who has not been taking his insulin or monitoring blood sugar. Patient found to be in DKA in ED and started on fluids / insulin with glucommander. Discussed results and tx plan for admission with patient. Discussed patient with Dr. Croft who is agreeable with ED course and tx plan. Discussed admission with hospitalist Dr. Coles.     DDx  DKA, Sepsis, Viral illness, Pancreatitis, Drug use, Electrolyte abnormality     Diabetic ketoacidosis without coma associated with type 1 diabetes mellitus (HCC): acute illness or injury  Marijuana use: acute illness or injury  Nausea and vomiting, unspecified vomiting type: acute illness or injury  Noncompliance: acute illness or injury  Amount and/or Complexity of Data Reviewed  External Data Reviewed: notes.     Details: ED, PCP, Admission   Labs: ordered. Decision-making details documented in ED Course.  Radiology: ordered. Decision-making details documented  in ED Course.      Risk  Prescription drug management.  Decision regarding hospitalization.          Final diagnoses:   Diabetic ketoacidosis without coma associated with type 1 diabetes mellitus (HCC)   Nausea and vomiting, unspecified vomiting type   Noncompliance   Marijuana use       ED Disposition  ED Disposition     ED Disposition   Decision to Admit    Condition   --    Comment   Level of Care: Telemetry [5]   Diagnosis: Diabetic ketoacidosis without coma associated with type 1 diabetes mellitus (HCC) [[id number]]   Admitting Physician: STEVENSON MORENO [[id number]]   Attending Physician: STEVENSON MORENO [[id number]]   Certification: I Certify That Inpatient Hospital Services Are Medically Necessary For Greater Than 2 Midnights               No follow-up provider specified.       Medication List      No changes were made to your prescriptions during this visit.          Bertha Elliott PA  03/09/23 0487

## 2023-03-10 LAB
ANION GAP SERPL CALCULATED.3IONS-SCNC: 11 MMOL/L (ref 5–15)
ANION GAP SERPL CALCULATED.3IONS-SCNC: 13 MMOL/L (ref 5–15)
ANION GAP SERPL CALCULATED.3IONS-SCNC: 14 MMOL/L (ref 5–15)
ANION GAP SERPL CALCULATED.3IONS-SCNC: 15 MMOL/L (ref 5–15)
ARTERIAL PATENCY WRIST A: ABNORMAL
ATMOSPHERIC PRESS: ABNORMAL MM[HG]
BASE EXCESS BLDA CALC-SCNC: 2.7 MMOL/L (ref 0–2)
BDY SITE: ABNORMAL
BODY TEMPERATURE: 37 C
BUN SERPL-MCNC: 11 MG/DL (ref 6–20)
BUN SERPL-MCNC: 13 MG/DL (ref 6–20)
BUN SERPL-MCNC: 9 MG/DL (ref 6–20)
BUN SERPL-MCNC: 9 MG/DL (ref 6–20)
BUN/CREAT SERPL: 16.1 (ref 7–25)
BUN/CREAT SERPL: 16.4 (ref 7–25)
BUN/CREAT SERPL: 21.3 (ref 7–25)
BUN/CREAT SERPL: 21.6 (ref 7–25)
CALCIUM SPEC-SCNC: 8.6 MG/DL (ref 8.6–10.5)
CALCIUM SPEC-SCNC: 8.8 MG/DL (ref 8.6–10.5)
CALCIUM SPEC-SCNC: 8.9 MG/DL (ref 8.6–10.5)
CALCIUM SPEC-SCNC: 8.9 MG/DL (ref 8.6–10.5)
CHLORIDE SERPL-SCNC: 100 MMOL/L (ref 98–107)
CHLORIDE SERPL-SCNC: 102 MMOL/L (ref 98–107)
CHLORIDE SERPL-SCNC: 103 MMOL/L (ref 98–107)
CHLORIDE SERPL-SCNC: 98 MMOL/L (ref 98–107)
CO2 BLDA-SCNC: 25 MMOL/L (ref 22–33)
CO2 SERPL-SCNC: 22 MMOL/L (ref 22–29)
CO2 SERPL-SCNC: 22 MMOL/L (ref 22–29)
CO2 SERPL-SCNC: 23 MMOL/L (ref 22–29)
CO2 SERPL-SCNC: 24 MMOL/L (ref 22–29)
COHGB MFR BLD: 0.8 % (ref 0–2)
CREAT SERPL-MCNC: 0.51 MG/DL (ref 0.76–1.27)
CREAT SERPL-MCNC: 0.55 MG/DL (ref 0.76–1.27)
CREAT SERPL-MCNC: 0.56 MG/DL (ref 0.76–1.27)
CREAT SERPL-MCNC: 0.61 MG/DL (ref 0.76–1.27)
EGFRCR SERPLBLD CKD-EPI 2021: 135 ML/MIN/1.73
EGFRCR SERPLBLD CKD-EPI 2021: 138.5 ML/MIN/1.73
EGFRCR SERPLBLD CKD-EPI 2021: 139.3 ML/MIN/1.73
EGFRCR SERPLBLD CKD-EPI 2021: 142.5 ML/MIN/1.73
EPAP: 0
GLUCOSE BLDC GLUCOMTR-MCNC: 109 MG/DL (ref 70–130)
GLUCOSE BLDC GLUCOMTR-MCNC: 117 MG/DL (ref 70–130)
GLUCOSE BLDC GLUCOMTR-MCNC: 139 MG/DL (ref 70–130)
GLUCOSE BLDC GLUCOMTR-MCNC: 152 MG/DL (ref 70–130)
GLUCOSE BLDC GLUCOMTR-MCNC: 156 MG/DL (ref 70–130)
GLUCOSE BLDC GLUCOMTR-MCNC: 165 MG/DL (ref 70–130)
GLUCOSE BLDC GLUCOMTR-MCNC: 183 MG/DL (ref 70–130)
GLUCOSE BLDC GLUCOMTR-MCNC: 185 MG/DL (ref 70–130)
GLUCOSE BLDC GLUCOMTR-MCNC: 194 MG/DL (ref 70–130)
GLUCOSE BLDC GLUCOMTR-MCNC: 199 MG/DL (ref 70–130)
GLUCOSE BLDC GLUCOMTR-MCNC: 200 MG/DL (ref 70–130)
GLUCOSE BLDC GLUCOMTR-MCNC: 265 MG/DL (ref 70–130)
GLUCOSE SERPL-MCNC: 149 MG/DL (ref 65–99)
GLUCOSE SERPL-MCNC: 165 MG/DL (ref 65–99)
GLUCOSE SERPL-MCNC: 196 MG/DL (ref 65–99)
GLUCOSE SERPL-MCNC: 208 MG/DL (ref 65–99)
HCO3 BLDA-SCNC: 24.1 MMOL/L (ref 20–26)
HCT VFR BLD CALC: 42.9 % (ref 38–51)
HGB BLDA-MCNC: 14 G/DL (ref 13.5–17.5)
INHALED O2 CONCENTRATION: 21 %
IPAP: 0
MAGNESIUM SERPL-MCNC: 2.4 MG/DL (ref 1.6–2.6)
MAGNESIUM SERPL-MCNC: 2.5 MG/DL (ref 1.6–2.6)
METHGB BLD QL: 0.4 % (ref 0–1.5)
MODALITY: ABNORMAL
NOTE: ABNORMAL
OXYHGB MFR BLDV: 98.4 % (ref 94–99)
PAW @ PEAK INSP FLOW SETTING VENT: 0 CMH2O
PCO2 BLDA: 27.7 MM HG (ref 35–45)
PCO2 TEMP ADJ BLD: 27.7 MM HG (ref 35–48)
PH BLDA: 7.55 PH UNITS (ref 7.35–7.45)
PH, TEMP CORRECTED: 7.55 PH UNITS
PHOSPHATE SERPL-MCNC: 2.7 MG/DL (ref 2.5–4.5)
PHOSPHATE SERPL-MCNC: 2.7 MG/DL (ref 2.5–4.5)
PO2 BLDA: 116 MM HG (ref 83–108)
PO2 TEMP ADJ BLD: 116 MM HG (ref 83–108)
POTASSIUM SERPL-SCNC: 3.4 MMOL/L (ref 3.5–5.2)
POTASSIUM SERPL-SCNC: 3.7 MMOL/L (ref 3.5–5.2)
POTASSIUM SERPL-SCNC: 3.9 MMOL/L (ref 3.5–5.2)
POTASSIUM SERPL-SCNC: 4 MMOL/L (ref 3.5–5.2)
SODIUM SERPL-SCNC: 135 MMOL/L (ref 136–145)
SODIUM SERPL-SCNC: 135 MMOL/L (ref 136–145)
SODIUM SERPL-SCNC: 137 MMOL/L (ref 136–145)
SODIUM SERPL-SCNC: 140 MMOL/L (ref 136–145)
TOTAL RATE: 0 BREATHS/MINUTE

## 2023-03-10 PROCEDURE — 82375 ASSAY CARBOXYHB QUANT: CPT

## 2023-03-10 PROCEDURE — 83735 ASSAY OF MAGNESIUM: CPT | Performed by: NURSE PRACTITIONER

## 2023-03-10 PROCEDURE — 36600 WITHDRAWAL OF ARTERIAL BLOOD: CPT

## 2023-03-10 PROCEDURE — 82962 GLUCOSE BLOOD TEST: CPT

## 2023-03-10 PROCEDURE — 25010000002 ONDANSETRON PER 1 MG: Performed by: NURSE PRACTITIONER

## 2023-03-10 PROCEDURE — 25010000002 MAGNESIUM SULFATE 2 GM/50ML SOLUTION: Performed by: INTERNAL MEDICINE

## 2023-03-10 PROCEDURE — 83050 HGB METHEMOGLOBIN QUAN: CPT

## 2023-03-10 PROCEDURE — 93005 ELECTROCARDIOGRAM TRACING: CPT | Performed by: FAMILY MEDICINE

## 2023-03-10 PROCEDURE — 25010000002 PROCHLORPERAZINE 10 MG/2ML SOLUTION: Performed by: FAMILY MEDICINE

## 2023-03-10 PROCEDURE — 84100 ASSAY OF PHOSPHORUS: CPT | Performed by: NURSE PRACTITIONER

## 2023-03-10 PROCEDURE — 63710000001 INSULIN LISPRO (HUMAN) PER 5 UNITS: Performed by: FAMILY MEDICINE

## 2023-03-10 PROCEDURE — 80048 BASIC METABOLIC PNL TOTAL CA: CPT | Performed by: NURSE PRACTITIONER

## 2023-03-10 PROCEDURE — 99231 SBSQ HOSP IP/OBS SF/LOW 25: CPT | Performed by: FAMILY MEDICINE

## 2023-03-10 PROCEDURE — 82805 BLOOD GASES W/O2 SATURATION: CPT

## 2023-03-10 PROCEDURE — 63710000001 INSULIN DETEMIR PER 5 UNITS: Performed by: FAMILY MEDICINE

## 2023-03-10 PROCEDURE — 93010 ELECTROCARDIOGRAM REPORT: CPT | Performed by: INTERNAL MEDICINE

## 2023-03-10 RX ORDER — PROCHLORPERAZINE EDISYLATE 5 MG/ML
5 INJECTION INTRAMUSCULAR; INTRAVENOUS EVERY 8 HOURS
Status: COMPLETED | OUTPATIENT
Start: 2023-03-10 | End: 2023-03-11

## 2023-03-10 RX ORDER — DEXTROSE MONOHYDRATE 25 G/50ML
25 INJECTION, SOLUTION INTRAVENOUS
Status: DISCONTINUED | OUTPATIENT
Start: 2023-03-10 | End: 2023-03-11 | Stop reason: HOSPADM

## 2023-03-10 RX ORDER — SODIUM CHLORIDE 9 MG/ML
100 INJECTION, SOLUTION INTRAVENOUS CONTINUOUS
Status: ACTIVE | OUTPATIENT
Start: 2023-03-10 | End: 2023-03-11

## 2023-03-10 RX ORDER — NICOTINE POLACRILEX 4 MG
15 LOZENGE BUCCAL
Status: DISCONTINUED | OUTPATIENT
Start: 2023-03-10 | End: 2023-03-11 | Stop reason: HOSPADM

## 2023-03-10 RX ORDER — PROCHLORPERAZINE EDISYLATE 5 MG/ML
5 INJECTION INTRAMUSCULAR; INTRAVENOUS EVERY 8 HOURS PRN
Status: DISCONTINUED | OUTPATIENT
Start: 2023-03-10 | End: 2023-03-10

## 2023-03-10 RX ORDER — INSULIN LISPRO 100 [IU]/ML
0-7 INJECTION, SOLUTION INTRAVENOUS; SUBCUTANEOUS
Status: DISCONTINUED | OUTPATIENT
Start: 2023-03-10 | End: 2023-03-11 | Stop reason: HOSPADM

## 2023-03-10 RX ADMIN — POTASSIUM CHLORIDE, DEXTROSE MONOHYDRATE AND SODIUM CHLORIDE 150 ML/HR: 150; 5; 450 INJECTION, SOLUTION INTRAVENOUS at 09:22

## 2023-03-10 RX ADMIN — SODIUM CHLORIDE 100 ML/HR: 9 INJECTION, SOLUTION INTRAVENOUS at 15:56

## 2023-03-10 RX ADMIN — ONDANSETRON 4 MG: 2 INJECTION INTRAMUSCULAR; INTRAVENOUS at 05:26

## 2023-03-10 RX ADMIN — PROCHLORPERAZINE EDISYLATE 5 MG: 5 INJECTION INTRAMUSCULAR; INTRAVENOUS at 23:24

## 2023-03-10 RX ADMIN — INSULIN LISPRO 2 UNITS: 100 INJECTION, SOLUTION INTRAVENOUS; SUBCUTANEOUS at 11:49

## 2023-03-10 RX ADMIN — Medication 10 ML: at 20:46

## 2023-03-10 RX ADMIN — SODIUM CHLORIDE 100 ML/HR: 9 INJECTION, SOLUTION INTRAVENOUS at 23:26

## 2023-03-10 RX ADMIN — ONDANSETRON 4 MG: 2 INJECTION INTRAMUSCULAR; INTRAVENOUS at 11:45

## 2023-03-10 RX ADMIN — ONDANSETRON 4 MG: 2 INJECTION INTRAMUSCULAR; INTRAVENOUS at 20:46

## 2023-03-10 RX ADMIN — INSULIN LISPRO 2 UNITS: 100 INJECTION, SOLUTION INTRAVENOUS; SUBCUTANEOUS at 17:42

## 2023-03-10 RX ADMIN — PROCHLORPERAZINE EDISYLATE 5 MG: 5 INJECTION INTRAMUSCULAR; INTRAVENOUS at 15:56

## 2023-03-10 RX ADMIN — INSULIN DETEMIR 10 UNITS: 100 INJECTION, SOLUTION SUBCUTANEOUS at 10:30

## 2023-03-10 RX ADMIN — MAGNESIUM SULFATE 2 G: 2 INJECTION INTRAVENOUS at 01:39

## 2023-03-10 RX ADMIN — POTASSIUM CHLORIDE, DEXTROSE MONOHYDRATE AND SODIUM CHLORIDE 150 ML/HR: 150; 5; 450 INJECTION, SOLUTION INTRAVENOUS at 02:47

## 2023-03-10 NOTE — H&P
[facility] Medicine Services  HISTORY AND PHYSICAL    Patient Name: Ford Mukherjee Jr.  : 1995  MRN: [id number]  Primary Care Physician: Shalini Goodson APRN  Date of admission: 3/9/2023    Subjective   Subjective     Chief Complaint:  Nausea vomiting    HPI:  Ford Mukherjee Jr. is a 28 y.o. male with a history of T2DM presents to the ED with complaints nausea and vomiting with onset this morning.  Patient does not check his glucose levels regularly at home.  He also states that he has not been taking his Levemir as prescribed and has missed several doses.  He did take 35 units earlier today after he started vomiting.  He endorses polydipsia but denies fever, chills, cough, shortness of air, chest pain, abdominal pain, diarrhea, dysuria, or any other complaints this time.  He does use marijuana occasionally with the last time being 2 weeks ago.  CT abdomen pelvis shows no acute abdominal or pelvic abnormality.  Chest x-ray shows no acute cardiopulmonary abnormality.  Patient was found to be in DKA upon arrival to the ED, and started on an insulin drip per Glucomander protocol.  Patient is being admitted to the hospital medicine service for further evaluation and management.           Review of Systems   Constitutional: Negative.    HENT: Negative.    Eyes: Negative.    Respiratory: Negative.    Cardiovascular: Negative.    Gastrointestinal: Positive for nausea and vomiting. Negative for abdominal distention, abdominal pain, constipation and diarrhea.   Endocrine: Positive for polydipsia. Negative for polyphagia and polyuria.   Genitourinary: Negative.    Musculoskeletal: Negative.    Skin: Negative.    Allergic/Immunologic: Negative.    Neurological: Negative.    Hematological: Negative.    Psychiatric/Behavioral: Negative.             Personal History     Past Medical History:   Diagnosis Date   • Diabetes mellitus (HCC)     PT DENIES THIS DX, PREVIOUS DOCUMENTED    • GI (gastrointestinal bleed)              No past surgical history on file.  Patient denies surgical history    Family History:  family history includes Diabetes in his mother; No Known Problems in his father.     Social History:  reports that he has quit smoking. He has never used smokeless tobacco. He reports current alcohol use. He reports that he does not use drugs.  Social History     Social History Narrative   • Not on file       Medications:  Alcohol Swabs, Blood Glucose Monitoring Suppl, Insulin Pen Needle, freestyle, glucose blood, hyoscyamine, insulin detemir, metFORMIN, ondansetron ODT, and pantoprazole    No Known Allergies    Objective   Objective     Vital Signs:   Temp:  [97.8 °F (36.6 °C)-98.7 °F (37.1 °C)] 98.7 °F (37.1 °C)  Heart Rate:  [] 117  Resp:  [16-18] 16  BP: (135-162)/() 135/94    Physical Exam   Constitutional: Awake, alert, resting in bed, family at bedside  Eyes: PERRLA, sclerae anicteric, no conjunctival injection  HENT: NCAT, mucous membranes moist  Neck: Supple, no thyromegaly, no lymphadenopathy, trachea midline  Respiratory: Clear to auscultation bilaterally, nonlabored respirations   Cardiovascular: RRR, no murmurs, rubs, or gallops, palpable pedal pulses bilaterally  Gastrointestinal: Positive bowel sounds, soft, nontender, nondistended  Musculoskeletal: No bilateral ankle edema, no clubbing or cyanosis to extremities  Psychiatric: Appropriate affect, cooperative  Neurologic: Oriented x 3, strength symmetric in all extremities, Cranial Nerves grossly intact to confrontation, speech clear  Skin: No rashes       Result Review:  I have personally reviewed the results from the time of this admission to 3/9/2023 21:23 EST and agree with these findings:  [x]  Laboratory list / accordion  []  Microbiology  [x]  Radiology  []  EKG/Telemetry   []  Cardiology/Vascular   []  Pathology  []  Old records  []  Other:  Most notable findings include:     LAB RESULTS:      Lab  03/09/23 1955 03/09/23  1731 03/09/23  1555   WBC  --  10.54 13.40*   HEMOGLOBIN  --  15.5 16.5   HEMATOCRIT  --  43.8 45.8   PLATELETS  --  222 236   NEUTROS ABS  --  10.15* 12.91*   IMMATURE GRANS (ABS)  --  0.04 0.09*   LYMPHS ABS  --  0.20* 0.21*   MONOS ABS  --  0.14 0.18   EOS ABS  --  0.00 0.00   MCV  --  84.2 82.2   LACTATE 1.9 5.2* 5.4*         Lab 03/09/23 1731 03/09/23  1555   SODIUM 137 138   POTASSIUM 3.8 4.2   CHLORIDE 97* 94*   CO2 21.0* 21.0*   ANION GAP 19.0* 23.0*   BUN 13 15   CREATININE 0.81 0.72*   EGFR 123.9 128.4   GLUCOSE 343* 376*   CALCIUM 9.0 10.1   MAGNESIUM 1.4* 1.5*   PHOSPHORUS 4.3 3.9   HEMOGLOBIN A1C  --  11.70*         Lab 03/09/23 1731 03/09/23  1555   TOTAL PROTEIN 7.6 8.3   ALBUMIN 4.8 5.3*   GLOBULIN 2.8 3.0   ALT (SGPT) 28 30   AST (SGOT) 15 17   BILIRUBIN 0.5 0.6   ALK PHOS 87 103   LIPASE  --  10*                     Lab 03/09/23  1557   FIO2 21   CARBOXYHEMOGLOBIN (VENOUS) 1.1     Brief Urine Lab Results  (Last result in the past 365 days)      Color   Clarity   Blood   Leuk Est   Nitrite   Protein   CREAT   Urine HCG        03/09/23 1731 Yellow   Clear   Negative   Negative   Negative   100 mg/dL (2+)               Microbiology Results (last 10 days)     ** No results found for the last 240 hours. **          XR Chest 2 View    Result Date: 3/9/2023  XR CHEST 2 VW Date of Exam: 3/9/2023 5:20 PM EST Indication: DKA. Comparison: AP chest x-ray 3/26/2022. Findings: Lungs are adequately expanded and clear. No pleural effusion is seen. Heart size is normal.     Impression: Impression: No acute cardiopulmonary abnormality. Electronically Signed: Delia Sunshine  3/9/2023 5:43 PM EST  Workstation ID: [id number]    CT Abdomen Pelvis With Contrast    Result Date: 3/9/2023  CT ABDOMEN PELVIS W CONTRAST Date of Exam: 3/9/2023 5:13 PM EST Indication: abd pain, vomiting, hx of diabetes Comparison: CT abdomen and pelvis 4/16/2022, 10/17/2022 Technique: Axial CT images were obtained of the  abdomen and pelvis following the uneventful intravenous administration of 90 mL Isovue 300. Reconstructed coronal and sagittal images were also obtained. Automated exposure control and iterative construction methods were used. Findings: Included lung bases are clear. There is probably layering sludge in the gallbladder. Liver, pancreas, spleen, adrenal glands, and kidneys appear within normal limits. No abnormal bowel distention is seen. Appendix is normal. No urinary bladder wall thickening is noted. There is no significant free fluid or lymphadenopathy. No acute osseous abnormality is identified.     Impression: Impression: No acute abdominal or pelvic abnormality is identified. Electronically Signed: Delia Sunshine  3/9/2023 5:42 PM EST  Workstation ID: [id number]          Assessment & Plan   Assessment & Plan       Diabetic ketoacidosis associated with type 2 diabetes mellitus (HCC)    Lactic acidosis    Hypomagnesemia    Marijuana use      Assessment and plan:    DKA  T1DM  Noncompliance  --CT abdomen pelvis shows no acute abdominal or pelvic abnormality.  -- A1c 11.7, beta hydroxybutyrate 1.877, glucose 376, CO2 21, anion gap 23  -- UA: Specific gravity 1.070, glucose 500, ketones >= 160, protein 100  -- Given 2 L of saline in the ED  -- Glucomander order set  -- Insulin drip  -- IV fluids per protocol  -- BMP, Mg, PO4 every 4 hours  -- Consult diabetes educator    Lactic acidosis  -- Lactate 5.4 and 5.2  -- IV fluid  -- Reflex lactate    Hypomagnesemia  -- Mg 1.4  -- On scale replacement  -- Check Mg every 4 hours    Marijuana use  -- Reports that he last used 2 weeks ago  -- UDS positive for THC      DVT prophylaxis: Lovenox    CODE STATUS:    Level Of Support Discussed With: Patient  Code Status (Patient has no pulse and is not breathing): CPR (Attempt to Resuscitate)  Medical Interventions (Patient has pulse or is breathing): Full Support      Expected Discharge tbd        This note has been completed as part  of a split-shared workflow.     Signature: Electronically signed by IKE Wilson, 03/09/23, 9:23 PM EST.  Total time spent: 55 minutes  Time spent includes time reviewing chart, face-to-face time, counseling patient/family/caregiver, ordering medications/tests/procedures, communicating with other health care professionals, documenting clinical information in the electronic health record, and coordination of care.      Attending   Admission Attestation       I have performed an independent face-to-face diagnostic evaluation including performing an independent physical examination as documented here.  The documented plan of care above was reviewed and developed with the advanced practice clinician (APC).      Brief Summary Statement:   Ford Mukherjee Jr. is a 28 y.o. male with hx of IDDM who presents with c/o n/v since this morning.  Not compliant w/ insulin and does not check blood sugar at home.  No f/c/cough/chest pain/shortness of breath.  No abd pain.  No d/c. Pt initiated on insulin drip in ER.      Remainder of detailed HPI is as noted by APC and has been reviewed and/or edited by me for completeness.    Attending Physical Exam:  Temp:  [97.8 °F (36.6 °C)-98.7 °F (37.1 °C)] 98.7 °F (37.1 °C)  Heart Rate:  [] 117  Resp:  [16-18] 16  BP: (135-162)/() 135/94    Separate exam performed by me w/ no changes to the above.        Brief Assessment/Plan :  See detailed assessment and plan developed with APC which I have reviewed and/or edited for completeness.  27 y/o male w/ hx of IDDM w/ hx of noncompliance w/ insulin here w/  1. DKA  -pt w/ electrolyte derangement, lactic acidosis, +beta hydroxybutyrate  -a1c 11.7  -glucomander orders in place  -electrolyte replacement   2. Lactic acidosis  -related to above  -slightly improved       INPATIENT status due to DKA, lactic acidosis.  I feel patient’s risk for adverse outcomes and need for care warrant INPATIENT evaluation and I predict the  patient’s care encounter to likely last beyond 2 midnights.    Electronically signed by Nadine Worthy MD, 03/09/23, 9:53 PM EST.    Nadine Worthy MD  03/09/23

## 2023-03-10 NOTE — CONSULTS
"Consult received for diabetes education. Chart reviewed. Patient has been seen by diabetes education in the past during hospital admissions. Current A1c is 11.7%. He has a glucometer at home but has not been using it recently. He stated he has glucometer supplies. He is in between jobs and insurance, so he has not been able to afford the medication. He is currently prescribed Metformin and Levemir (20-25 units). He was using an insulin pen. He said once he is able to start his job, when he is discharged, he will be able to afford his medications. Patient encouraged to take medications as prescribed and to contact PCP. Also discussed  the possibility of a referral for Endocrinology. It looks like he had an appointment in June 2022 but cancelled.  Patient provided \"What is Diabetes?\" A1c and Blood Glucose Goals handouts.   "

## 2023-03-10 NOTE — PROGRESS NOTES
[facility] Medicine Services  PROGRESS NOTE    Patient Name: Ford Mukherjee Jr.  : 1995  MRN: [id number]    Date of Admission: 3/9/2023  Primary Care Physician: Shalini Goodson APRN    Subjective   Subjective     CC:  F/u nausea and vomiting     HPI:  Pt reports feeling better. He was a little nauseated and was going to try and eat  Pt vomited later in afternoon     ROS:  Gen- No fevers, chills  CV- No chest pain, palpitations  Resp- No cough, dyspnea  GI- + N/V/D, abd pain        Objective   Objective     Vital Signs:   Temp:  [97.5 °F (36.4 °C)-98.7 °F (37.1 °C)] 97.5 °F (36.4 °C)  Heart Rate:  [] 81  Resp:  [16] 16  BP: (135-164)/() 164/106     Physical Exam:  Constitutional: No acute distress, awake, alert  HENT: NCAT, mucous membranes moist  Respiratory: Clear to auscultation bilaterally, respiratory effort normal   Cardiovascular: RRR, no murmurs, rubs, or gallops  Gastrointestinal: Positive bowel sounds, soft, nontender, nondistended  Musculoskeletal: No bilateral ankle edema  Psychiatric: Anxious  affect, cooperative  Neurologic: Oriented x 3,  speech clear  Skin: No rashes      Results Reviewed:  LAB RESULTS:      Lab 23  17323  1555   WBC  --  10.54 13.40*   HEMOGLOBIN  --  15.5 16.5   HEMATOCRIT  --  43.8 45.8   PLATELETS  --  222 236   NEUTROS ABS  --  10.15* 12.91*   IMMATURE GRANS (ABS)  --  0.04 0.09*   LYMPHS ABS  --  0.20* 0.21*   MONOS ABS  --  0.14 0.18   EOS ABS  --  0.00 0.00   MCV  --  84.2 82.2   LACTATE 1.9 5.2* 5.4*         Lab 03/10/23  1335 03/10/23  1125 03/10/23  0510 03/10/23  0025 23  1731 23  1555   SODIUM 135* 135* 137 140 137 138   POTASSIUM 3.4* 3.7 3.9 4.0 3.8 4.2   CHLORIDE 98 100 102 103 97* 94*   CO2 22.0 22.0 24.0 23.0 21.0* 21.0*   ANION GAP 15.0 13.0 11.0 14.0 19.0* 23.0*   BUN 9 9 11 13 13 15   CREATININE 0.56* 0.55* 0.51* 0.61* 0.81 0.72*   EGFR 138.5 139.3 142.5 135.0 123.9  128.4   GLUCOSE 208* 149* 165* 196* 343* 376*   CALCIUM 8.9 8.8 8.6 8.9 9.0 10.1   MAGNESIUM  --   --  2.5 2.4 1.4* 1.6  1.5*   PHOSPHORUS  --   --  2.7 2.7 4.3 3.9   HEMOGLOBIN A1C  --   --   --   --   --  11.70*         Lab 03/09/23  1731 03/09/23  1555   TOTAL PROTEIN 7.6 8.3   ALBUMIN 4.8 5.3*   GLOBULIN 2.8 3.0   ALT (SGPT) 28 30   AST (SGOT) 15 17   BILIRUBIN 0.5 0.6   ALK PHOS 87 103   LIPASE  --  10*                     Lab 03/10/23  0902 03/09/23  1557   PH, ARTERIAL 7.548*  --    PCO2, ARTERIAL 27.7*  --    PO2 .0*  --    FIO2 21 21   HCO3 ART 24.1  --    BASE EXCESS ART 2.7*  --    CARBOXYHEMOGLOBIN 0.8  --    CARBOXYHEMOGLOBIN (VENOUS)  --  1.1     Brief Urine Lab Results  (Last result in the past 365 days)      Color   Clarity   Blood   Leuk Est   Nitrite   Protein   CREAT   Urine HCG        03/09/23 1731 Yellow   Clear   Negative   Negative   Negative   100 mg/dL (2+)                 Microbiology Results Abnormal     None          XR Chest 2 View    Result Date: 3/9/2023  XR CHEST 2 VW Date of Exam: 3/9/2023 5:20 PM EST Indication: DKA. Comparison: AP chest x-ray 3/26/2022. Findings: Lungs are adequately expanded and clear. No pleural effusion is seen. Heart size is normal.     Impression: Impression: No acute cardiopulmonary abnormality. Electronically Signed: Delia Sunshine  3/9/2023 5:43 PM EST  Workstation ID: [id number]    CT Abdomen Pelvis With Contrast    Result Date: 3/9/2023  CT ABDOMEN PELVIS W CONTRAST Date of Exam: 3/9/2023 5:13 PM EST Indication: abd pain, vomiting, hx of diabetes Comparison: CT abdomen and pelvis 4/16/2022, 10/17/2022 Technique: Axial CT images were obtained of the abdomen and pelvis following the uneventful intravenous administration of 90 mL Isovue 300. Reconstructed coronal and sagittal images were also obtained. Automated exposure control and iterative construction methods were used. Findings: Included lung bases are clear. There is probably layering sludge in  the gallbladder. Liver, pancreas, spleen, adrenal glands, and kidneys appear within normal limits. No abnormal bowel distention is seen. Appendix is normal. No urinary bladder wall thickening is noted. There is no significant free fluid or lymphadenopathy. No acute osseous abnormality is identified.     Impression: Impression: No acute abdominal or pelvic abnormality is identified. Electronically Signed: Delia Sunshine  3/9/2023 5:42 PM EST  Workstation ID: [id number]          Current medications:  Scheduled Meds:enoxaparin, 40 mg, Subcutaneous, Daily  insulin detemir, 10 Units, Subcutaneous, Daily  insulin lispro, 0-7 Units, Subcutaneous, TID AC  prochlorperazine, 5 mg, Intravenous, Q8H      Continuous Infusions:sodium chloride, 100 mL/hr, Last Rate: 100 mL/hr (03/10/23 1556)      PRN Meds:.•  acetaminophen **OR** acetaminophen **OR** acetaminophen  •  dextrose  •  dextrose  •  glucagon (human recombinant)  •  melatonin  •  ondansetron **OR** ondansetron  •  [COMPLETED] Insert Peripheral IV **AND** sodium chloride  •  sodium chloride  •  sodium chloride    Assessment & Plan   Assessment & Plan     Active Hospital Problems    Diagnosis  POA   • **Diabetic ketoacidosis associated with type 2 diabetes mellitus (HCC) [E11.10]  Yes   • Lactic acidosis [E87.20]  Yes   • Hypomagnesemia [E83.42]  Yes   • Marijuana use [F12.90]  Yes      Resolved Hospital Problems   No resolved problems to display.        Brief Hospital Course to date:  Ford Mukherjee Jr. is a 28 y.o. male with DM that presented with nausea and vomiting.     DKA  T1DM  Noncompliance  --CT abdomen pelvis shows no acute abdominal or pelvic abnormality.  -- A1c 11.7, beta hydroxybutyrate 1.877, glucose 376, CO2 21, anion gap 23 on admission   --gap closed  --stopped fluids but pt started vomiting again, restarted  -titrating insulin   -Social work, diabetes educator      Lactic acidosis  -- Lactate 5.4 and 5.2 on admission   -- IV  fluid     Hypomagnesemia  -- Mg 1.4  -- On scale replacement  -- Check Mg every 4 hours     Marijuana use  -- Reports that he last used 2 weeks ago  -- UDS positive for THC        Expected Discharge Location and Transportation: home   Expected Discharge   Expected Discharge Date and Time     Expected Discharge Date Expected Discharge Time    Mar 12, 2023            DVT prophylaxis:  Medical DVT prophylaxis orders are present.          CODE STATUS:   Code Status and Medical Interventions:   Ordered at: 03/09/23 2123     Level Of Support Discussed With:    Patient     Code Status (Patient has no pulse and is not breathing):    CPR (Attempt to Resuscitate)     Medical Interventions (Patient has pulse or is breathing):    Full Support       Edith Melgoza, DO  03/10/23

## 2023-03-10 NOTE — CASE MANAGEMENT/SOCIAL WORK
Continued Stay Note  [facility]     Patient Name: Ford Mukherjee Jr.  MRN: [id number]  Today's Date: 3/10/2023    Admit Date: 3/9/2023    Plan: Home   Discharge Plan     Row Name 03/10/23 1539       Plan    Plan Comments MSW notified by Diabetes education that pt reports not being able to afford his metformin and levemir. MSW called pt's pharmacy to see about cost and possible assistance. Pharmacist reports that pt's Medicaid is active and both medications have  $0 copay.               Discharge Codes    No documentation.               Expected Discharge Date and Time     Expected Discharge Date Expected Discharge Time    Mar 12, 2023             ERICA Duron

## 2023-03-10 NOTE — CASE MANAGEMENT/SOCIAL WORK
Discharge Planning Assessment  [facility]     Patient Name: Ford Mukherjee Jr.  MRN: [id number]  Today's Date: 3/10/2023    Admit Date: 3/9/2023    Plan: Home   Discharge Needs Assessment     Row Name 03/10/23 0919       Living Environment    People in Home parent(s)    Current Living Arrangements home    Potentially Unsafe Housing Conditions unable to assess    Primary Care Provided by self    Provides Primary Care For no one    Family Caregiver if Needed parent(s)    Quality of Family Relationships unable to assess    Able to Return to Prior Arrangements yes       Resource/Environmental Concerns    Resource/Environmental Concerns none    Transportation Concerns none       Food Insecurity    Within the past 12 months, you worried that your food would run out before you got the money to buy more. Never true    Within the past 12 months, the food you bought just didn't last and you didn't have money to get more. Never true       Transition Planning    Patient/Family Anticipates Transition to home with family    Patient/Family Anticipated Services at Transition none    Transportation Anticipated family or friend will provide       Discharge Needs Assessment    Equipment Currently Used at Home glucometer    Concerns to be Addressed denies needs/concerns at this time    Anticipated Changes Related to Illness none    Equipment Needed After Discharge none               Discharge Plan     Row Name 03/10/23 0922       Plan    Plan Home    Patient/Family in Agreement with Plan yes    Plan Comments Spoke with patient at bedside to initiate discharge planning.  Patient states he lives with his parents in [facility]; PCP is Shalini Goodson; insurance is [facility] [employer] of [place].  Patient states he is independent with ADLs and mobility; he denies HH; he has a glucometer at home.  Patient states parents will provide transportation at discharge.  Patient denies needs at this time.  CM will continue to follow.    Final  Discharge Disposition Code 01 - home or self-care              Continued Care and Services - Admitted Since 3/9/2023    Coordination has not been started for this encounter.       Expected Discharge Date and Time     Expected Discharge Date Expected Discharge Time    Mar 12, 2023          Demographic Summary     Row Name 03/10/23 0918       General Information    Arrived From home    Referral Source admission list    Reason for Consult discharge planning    Preferred Language English       Contact Information    Permission Granted to Share Info With                Functional Status     Row Name 03/10/23 0918       Functional Status    Usual Activity Tolerance excellent    Current Activity Tolerance excellent       Functional Status, IADL    Medications independent    Housekeeping independent    Shopping independent               Psychosocial    No documentation.                Abuse/Neglect    No documentation.                Legal    No documentation.                Substance Abuse    No documentation.                Patient Forms    No documentation.                   Kristen Silva RN

## 2023-03-11 ENCOUNTER — READMISSION MANAGEMENT (OUTPATIENT)
Dept: CALL CENTER | Facility: HOSPITAL | Age: 28
End: 2023-03-11
Payer: MEDICAID

## 2023-03-11 VITALS
RESPIRATION RATE: 18 BRPM | WEIGHT: 152 LBS | HEIGHT: 71 IN | HEART RATE: 111 BPM | BODY MASS INDEX: 21.28 KG/M2 | OXYGEN SATURATION: 98 % | TEMPERATURE: 97.8 F | DIASTOLIC BLOOD PRESSURE: 95 MMHG | SYSTOLIC BLOOD PRESSURE: 134 MMHG

## 2023-03-11 PROBLEM — E87.20 LACTIC ACIDOSIS: Status: RESOLVED | Noted: 2023-03-09 | Resolved: 2023-03-11

## 2023-03-11 PROBLEM — E11.10 DIABETIC KETOACIDOSIS ASSOCIATED WITH TYPE 2 DIABETES MELLITUS: Status: RESOLVED | Noted: 2023-03-09 | Resolved: 2023-03-11

## 2023-03-11 PROBLEM — E83.42 HYPOMAGNESEMIA: Status: RESOLVED | Noted: 2023-03-09 | Resolved: 2023-03-11

## 2023-03-11 LAB
ANION GAP SERPL CALCULATED.3IONS-SCNC: 14 MMOL/L (ref 5–15)
BUN SERPL-MCNC: 11 MG/DL (ref 6–20)
BUN/CREAT SERPL: 20 (ref 7–25)
CALCIUM SPEC-SCNC: 8.8 MG/DL (ref 8.6–10.5)
CHLORIDE SERPL-SCNC: 97 MMOL/L (ref 98–107)
CO2 SERPL-SCNC: 22 MMOL/L (ref 22–29)
CREAT SERPL-MCNC: 0.55 MG/DL (ref 0.76–1.27)
EGFRCR SERPLBLD CKD-EPI 2021: 139.3 ML/MIN/1.73
GLUCOSE BLDC GLUCOMTR-MCNC: 223 MG/DL (ref 70–130)
GLUCOSE SERPL-MCNC: 219 MG/DL (ref 65–99)
POTASSIUM SERPL-SCNC: 3.7 MMOL/L (ref 3.5–5.2)
SODIUM SERPL-SCNC: 133 MMOL/L (ref 136–145)

## 2023-03-11 PROCEDURE — 63710000001 INSULIN LISPRO (HUMAN) PER 5 UNITS: Performed by: FAMILY MEDICINE

## 2023-03-11 PROCEDURE — 25010000002 ENOXAPARIN PER 10 MG: Performed by: NURSE PRACTITIONER

## 2023-03-11 PROCEDURE — 63710000001 INSULIN DETEMIR PER 5 UNITS: Performed by: FAMILY MEDICINE

## 2023-03-11 PROCEDURE — 80048 BASIC METABOLIC PNL TOTAL CA: CPT | Performed by: FAMILY MEDICINE

## 2023-03-11 PROCEDURE — 99239 HOSP IP/OBS DSCHRG MGMT >30: CPT | Performed by: FAMILY MEDICINE

## 2023-03-11 PROCEDURE — 25010000002 PROCHLORPERAZINE 10 MG/2ML SOLUTION: Performed by: FAMILY MEDICINE

## 2023-03-11 PROCEDURE — 25010000002 ONDANSETRON PER 1 MG: Performed by: NURSE PRACTITIONER

## 2023-03-11 PROCEDURE — 82962 GLUCOSE BLOOD TEST: CPT

## 2023-03-11 RX ORDER — PANTOPRAZOLE SODIUM 40 MG/1
40 TABLET, DELAYED RELEASE ORAL 2 TIMES DAILY
Qty: 60 TABLET | Refills: 0 | Status: SHIPPED | OUTPATIENT
Start: 2023-03-11

## 2023-03-11 RX ORDER — ONDANSETRON 8 MG/1
8 TABLET, ORALLY DISINTEGRATING ORAL EVERY 8 HOURS PRN
Qty: 15 TABLET | Refills: 0 | Status: SHIPPED | OUTPATIENT
Start: 2023-03-11

## 2023-03-11 RX ADMIN — INSULIN DETEMIR 10 UNITS: 100 INJECTION, SOLUTION SUBCUTANEOUS at 09:14

## 2023-03-11 RX ADMIN — INSULIN LISPRO 3 UNITS: 100 INJECTION, SOLUTION INTRAVENOUS; SUBCUTANEOUS at 09:15

## 2023-03-11 RX ADMIN — ONDANSETRON 4 MG: 2 INJECTION INTRAMUSCULAR; INTRAVENOUS at 05:23

## 2023-03-11 RX ADMIN — ENOXAPARIN SODIUM 40 MG: 40 INJECTION SUBCUTANEOUS at 09:14

## 2023-03-11 RX ADMIN — PROCHLORPERAZINE EDISYLATE 5 MG: 5 INJECTION INTRAMUSCULAR; INTRAVENOUS at 09:14

## 2023-03-11 NOTE — OUTREACH NOTE
Prep Survey    Flowsheet Row Responses   [facility] [place] patient discharged from? [place]   Is LACE score < 7 ? No   Eligibility [facility]   Date of Admission 03/09/23   Date of Discharge 03/11/23   Discharge Disposition Home or Self Care   Discharge diagnosis Diabetic ketoacidosis associated with type 2 diabetes mellitus    Does the patient have one of the following disease processes/diagnoses(primary or secondary)? Other   Does the patient have Home health ordered? No   Is there a DME ordered? No   Prep survey completed? Yes          Liss PEGUERO - Registered Nurse

## 2023-03-12 NOTE — DISCHARGE SUMMARY
[facility] Medicine Services  DISCHARGE SUMMARY    Patient Name: Ford Mukherjee Jr.  : 1995  MRN: [id number]    Date of Admission: 3/9/2023  3:28 PM  Date of Discharge:  3/11/23  Primary Care Physician: Shalini Goodson APRN    Consults     No orders found from 2023 to 3/10/2023.          Hospital Course     Presenting Problem:   Diabetic ketoacidosis without coma associated with type 1 diabetes mellitus (HCC) [E10.10]    Active Hospital Problems    Diagnosis  POA   • Marijuana use [F12.90]  Yes      Resolved Hospital Problems    Diagnosis Date Resolved POA   • **Diabetic ketoacidosis associated with type 2 diabetes mellitus (HCC) [E11.10] 2023 Yes   • Lactic acidosis [E87.20] 2023 Yes   • Hypomagnesemia [E83.42] 2023 Yes          Hospital Course:  Ford Mukherjee Jr. is a 28 y.o. male with DM that presented with nausea and vomiting.      DKA  T2DM  Noncompliance  --CT abdomen pelvis shows no acute abdominal or pelvic abnormality.  -- A1c 11.7, beta hydroxybutyrate 1.877, glucose 376, CO2 21, anion gap 23 on admission   --pt states that he is better and that he plans to take better care of himself  --he is going to continue oral medication and titrate long acting up from 10 untis to 20 (home regimen)      Lactic acidosis-resolved     Hypomagnesemia  --replaced      Marijuana use  -- Reports that he last used 2 weeks ago  -- UDS positive for THC    Sinus Tachycardia  -suspect is related to anxiety and also may be related to THC use (he has not had in a bit)  -No symptoms and resolves when he is laying down     Discharge Follow Up Recommendations for outpatient labs/diagnostics:   PCP 1 week     Day of Discharge     HPI:   Pt states he is feeling better. He reports that he is very anxious. When asked about heart rate he reports he has had this in the past and that he absolutely wants to leave the hospital. Mother is bedside     Review of  Systems  Gen- No fevers, chills  CV- No chest pain, palpitations  Resp- No cough, dyspnea  GI- No N/V/D, abd pain        Vital Signs:          Physical Exam:  Constitutional: No acute distress, awake, alert  HENT: NCAT, mucous membranes moist  Respiratory: Clear to auscultation bilaterally, respiratory effort normal   Cardiovascular:  Tachycardia RR, no murmurs, rubs, or gallops  Gastrointestinal: Positive bowel sounds, soft, nontender, nondistended  Musculoskeletal: No bilateral ankle edema  Psychiatric: Appropriate affect, cooperative  Neurologic: Oriented x 3,  speech clear  Skin: No rashes      Pertinent  and/or Most Recent Results     LAB RESULTS:      Lab 03/09/23  1955 03/09/23  1731 03/09/23  1555   WBC  --  10.54 13.40*   HEMOGLOBIN  --  15.5 16.5   HEMATOCRIT  --  43.8 45.8   PLATELETS  --  222 236   NEUTROS ABS  --  10.15* 12.91*   IMMATURE GRANS (ABS)  --  0.04 0.09*   LYMPHS ABS  --  0.20* 0.21*   MONOS ABS  --  0.14 0.18   EOS ABS  --  0.00 0.00   MCV  --  84.2 82.2   LACTATE 1.9 5.2* 5.4*         Lab 03/11/23  0704 03/10/23  1335 03/10/23  1125 03/10/23  0510 03/10/23  0025 03/09/23  1731 03/09/23  1555   SODIUM 133* 135* 135* 137 140 137 138   POTASSIUM 3.7 3.4* 3.7 3.9 4.0 3.8 4.2   CHLORIDE 97* 98 100 102 103 97* 94*   CO2 22.0 22.0 22.0 24.0 23.0 21.0* 21.0*   ANION GAP 14.0 15.0 13.0 11.0 14.0 19.0* 23.0*   BUN 11 9 9 11 13 13 15   CREATININE 0.55* 0.56* 0.55* 0.51* 0.61* 0.81 0.72*   EGFR 139.3 138.5 139.3 142.5 135.0 123.9 128.4   GLUCOSE 219* 208* 149* 165* 196* 343* 376*   CALCIUM 8.8 8.9 8.8 8.6 8.9 9.0 10.1   MAGNESIUM  --   --   --  2.5 2.4 1.4* 1.6  1.5*   PHOSPHORUS  --   --   --  2.7 2.7 4.3 3.9   HEMOGLOBIN A1C  --   --   --   --   --   --  11.70*         Lab 03/09/23  1731 03/09/23  1555   TOTAL PROTEIN 7.6 8.3   ALBUMIN 4.8 5.3*   GLOBULIN 2.8 3.0   ALT (SGPT) 28 30   AST (SGOT) 15 17   BILIRUBIN 0.5 0.6   ALK PHOS 87 103   LIPASE  --  10*                     Lab 03/10/23  0902  03/09/23  1557   PH, ARTERIAL 7.548*  --    PCO2, ARTERIAL 27.7*  --    PO2 .0*  --    FIO2 21 21   HCO3 ART 24.1  --    BASE EXCESS ART 2.7*  --    CARBOXYHEMOGLOBIN 0.8  --    CARBOXYHEMOGLOBIN (VENOUS)  --  1.1     Brief Urine Lab Results  (Last result in the past 365 days)      Color   Clarity   Blood   Leuk Est   Nitrite   Protein   CREAT   Urine HCG        03/09/23 1731 Yellow   Clear   Negative   Negative   Negative   100 mg/dL (2+)               Microbiology Results (last 10 days)     ** No results found for the last 240 hours. **          XR Chest 2 View    Result Date: 3/9/2023  XR CHEST 2 VW Date of Exam: 3/9/2023 5:20 PM EST Indication: DKA. Comparison: AP chest x-ray 3/26/2022. Findings: Lungs are adequately expanded and clear. No pleural effusion is seen. Heart size is normal.     Impression: No acute cardiopulmonary abnormality. Electronically Signed: Delia Sunshine  3/9/2023 5:43 PM EST  Workstation ID: [id number]    CT Abdomen Pelvis With Contrast    Result Date: 3/9/2023  CT ABDOMEN PELVIS W CONTRAST Date of Exam: 3/9/2023 5:13 PM EST Indication: abd pain, vomiting, hx of diabetes Comparison: CT abdomen and pelvis 4/16/2022, 10/17/2022 Technique: Axial CT images were obtained of the abdomen and pelvis following the uneventful intravenous administration of 90 mL Isovue 300. Reconstructed coronal and sagittal images were also obtained. Automated exposure control and iterative construction methods were used. Findings: Included lung bases are clear. There is probably layering sludge in the gallbladder. Liver, pancreas, spleen, adrenal glands, and kidneys appear within normal limits. No abnormal bowel distention is seen. Appendix is normal. No urinary bladder wall thickening is noted. There is no significant free fluid or lymphadenopathy. No acute osseous abnormality is identified.     Impression: No acute abdominal or pelvic abnormality is identified. Electronically Signed: Delia Sunshine  3/9/2023  5:42 PM EST  Workstation ID: [id number]                  Plan for Follow-up of Pending Labs/Results:     Discharge Details        Discharge Medications      Continue These Medications      Instructions Start Date   Alcohol Swabs pads   1 swab, Does not apply, Daily      B-D UF III MINI PEN NEEDLES 31G X 5 MM misc  Generic drug: Insulin Pen Needle   1 pen, Does not apply, Nightly      Blood Glucose Monitoring Suppl w/Device kit   Check BG one time per day.      freestyle lancets   Check BG once per day.      glucose blood test strip   Check BG one time per day.      insulin detemir 100 UNIT/ML injection  Commonly known as: LEVEMIR   20 Units, Subcutaneous, Nightly      metFORMIN 1000 MG tablet  Commonly known as: GLUCOPHAGE   1,000 mg, Oral, 2 Times Daily With Meals      ondansetron ODT 8 MG disintegrating tablet  Commonly known as: ZOFRAN-ODT   8 mg, Translingual, Every 8 Hours PRN      pantoprazole 40 MG EC tablet  Commonly known as: PROTONIX   40 mg, Oral, 2 Times Daily         Stop These Medications    hyoscyamine 0.125 MG SL tablet  Commonly known as: LEVSIN            No Known Allergies      Discharge Disposition:  Home or Self Care    Diet:  Hospital:No active diet order      Activity:  Activity Instructions     Activity as Tolerated            Restrictions or Other Recommendations:         CODE STATUS:    Code Status and Medical Interventions:   Ordered at: 03/09/23 2123     Level Of Support Discussed With:    Patient     Code Status (Patient has no pulse and is not breathing):    CPR (Attempt to Resuscitate)     Medical Interventions (Patient has pulse or is breathing):    Full Support       No future appointments.    Additional Instructions for the Follow-ups that You Need to Schedule     Discharge Follow-up with PCP   As directed       Currently Documented PCP:    Shalini Goodson APRN    PCP Phone Number:    [phone]     Follow Up Details: 1 week                     Edith Melgoza  DO  03/12/23      Time Spent on Discharge:  I spent  35  minutes on this discharge activity which included: face-to-face encounter with the patient, reviewing the data in the system, coordination of the care with the nursing staff as well as consultants, documentation, and entering orders.

## 2023-03-13 ENCOUNTER — TRANSITIONAL CARE MANAGEMENT TELEPHONE ENCOUNTER (OUTPATIENT)
Dept: CALL CENTER | Facility: HOSPITAL | Age: 28
End: 2023-03-13
Payer: MEDICAID

## 2023-03-13 LAB
QT INTERVAL: 350 MS
QTC INTERVAL: 460 MS

## 2023-03-13 NOTE — OUTREACH NOTE
Call Center TCM Note    Flowsheet Row Responses   [facility] patient discharged from? [place]   Does the patient have one of the following disease processes/diagnoses(primary or secondary)? Other   TCM attempt successful? No   Unsuccessful attempts Attempt 1          Padmini Liang RN    3/13/2023, 12:36 EDT

## 2023-03-13 NOTE — OUTREACH NOTE
Call Center TCM Note    Flowsheet Row Responses   [facility] patient discharged from? [place]   Does the patient have one of the following disease processes/diagnoses(primary or secondary)? Other   TCM attempt successful? No   Unsuccessful attempts Attempt 2          Padmini Liang RN    3/13/2023, 15:19 EDT

## 2023-03-14 ENCOUNTER — TRANSITIONAL CARE MANAGEMENT TELEPHONE ENCOUNTER (OUTPATIENT)
Dept: CALL CENTER | Facility: HOSPITAL | Age: 28
End: 2023-03-14
Payer: MEDICAID

## 2023-05-07 ENCOUNTER — HOSPITAL ENCOUNTER (EMERGENCY)
Facility: HOSPITAL | Age: 28
Discharge: HOME | End: 2023-05-07
Attending: EMERGENCY MEDICINE | Admitting: EMERGENCY MEDICINE
Payer: MEDICAID

## 2023-05-07 VITALS
HEART RATE: 85 BPM | TEMPERATURE: 97.9 F | OXYGEN SATURATION: 98 % | SYSTOLIC BLOOD PRESSURE: 155 MMHG | WEIGHT: 156 LBS | DIASTOLIC BLOOD PRESSURE: 89 MMHG | BODY MASS INDEX: 21.84 KG/M2 | RESPIRATION RATE: 14 BRPM | HEIGHT: 71 IN

## 2023-05-07 DIAGNOSIS — E11.65 TYPE 2 DIABETES MELLITUS WITH HYPERGLYCEMIA, WITH LONG-TERM CURRENT USE OF INSULIN: ICD-10-CM

## 2023-05-07 DIAGNOSIS — Z79.4 TYPE 2 DIABETES MELLITUS WITH HYPERGLYCEMIA, WITH LONG-TERM CURRENT USE OF INSULIN: ICD-10-CM

## 2023-05-07 DIAGNOSIS — Z91.14 NON COMPLIANCE W MEDICATION REGIMEN: ICD-10-CM

## 2023-05-07 DIAGNOSIS — R11.2 NAUSEA AND VOMITING, UNSPECIFIED VOMITING TYPE: Primary | ICD-10-CM

## 2023-05-07 DIAGNOSIS — E11.10: ICD-10-CM

## 2023-05-07 LAB
ALBUMIN SERPL-MCNC: 5.2 G/DL (ref 3.5–5.2)
ALBUMIN/GLOB SERPL: 1.8 G/DL
ALP SERPL-CCNC: 80 U/L (ref 39–117)
ALT SERPL W P-5'-P-CCNC: 19 U/L (ref 1–41)
ANION GAP SERPL CALCULATED.3IONS-SCNC: 19 MMOL/L (ref 5–15)
AST SERPL-CCNC: 10 U/L (ref 1–40)
ATMOSPHERIC PRESS: ABNORMAL MM[HG]
B-OH-BUTYR SERPL-SCNC: 0.99 MMOL/L (ref 0.02–0.27)
BACTERIA UR QL AUTO: NORMAL /HPF
BASE EXCESS BLDV CALC-SCNC: 3.6 MMOL/L (ref -2–2)
BASOPHILS # BLD AUTO: 0.01 10*3/MM3 (ref 0–0.2)
BASOPHILS NFR BLD AUTO: 0.1 % (ref 0–1.5)
BDY SITE: ABNORMAL
BILIRUB SERPL-MCNC: 0.9 MG/DL (ref 0–1.2)
BILIRUB UR QL STRIP: NEGATIVE
BODY TEMPERATURE: 37 C
BUN SERPL-MCNC: 21 MG/DL (ref 6–20)
BUN/CREAT SERPL: 30.4 (ref 7–25)
CALCIUM SPEC-SCNC: 9.9 MG/DL (ref 8.6–10.5)
CHLORIDE SERPL-SCNC: 98 MMOL/L (ref 98–107)
CLARITY UR: CLEAR
CO2 BLDA-SCNC: 29.8 MMOL/L (ref 22–33)
CO2 SERPL-SCNC: 24 MMOL/L (ref 22–29)
COHGB MFR BLD: 1.5 %
COLOR UR: YELLOW
CREAT SERPL-MCNC: 0.69 MG/DL (ref 0.76–1.27)
D-LACTATE SERPL-SCNC: 1.8 MMOL/L (ref 0.5–2)
D-LACTATE SERPL-SCNC: 2.1 MMOL/L (ref 0.5–2)
DEPRECATED RDW RBC AUTO: 41 FL (ref 37–54)
EGFRCR SERPLBLD CKD-EPI 2021: 130.1 ML/MIN/1.73
EOSINOPHIL # BLD AUTO: 0 10*3/MM3 (ref 0–0.4)
EOSINOPHIL NFR BLD AUTO: 0 % (ref 0.3–6.2)
EPAP: 0
ERYTHROCYTE [DISTWIDTH] IN BLOOD BY AUTOMATED COUNT: 13.1 % (ref 12.3–15.4)
GLOBULIN UR ELPH-MCNC: 2.9 GM/DL
GLUCOSE BLDC GLUCOMTR-MCNC: 232 MG/DL (ref 70–130)
GLUCOSE BLDC GLUCOMTR-MCNC: 236 MG/DL (ref 70–130)
GLUCOSE BLDC GLUCOMTR-MCNC: 237 MG/DL (ref 70–130)
GLUCOSE BLDC GLUCOMTR-MCNC: 281 MG/DL (ref 70–130)
GLUCOSE SERPL-MCNC: 298 MG/DL (ref 65–99)
GLUCOSE UR STRIP-MCNC: ABNORMAL MG/DL
HBA1C MFR BLD: 10.1 % (ref 4.8–5.6)
HCO3 BLDV-SCNC: 28.5 MMOL/L (ref 22–28)
HCT VFR BLD AUTO: 45 % (ref 37.5–51)
HGB BLD-MCNC: 15.5 G/DL (ref 13–17.7)
HGB BLDA-MCNC: 14.6 G/DL (ref 13.5–17.5)
HGB UR QL STRIP.AUTO: NEGATIVE
HYALINE CASTS UR QL AUTO: NORMAL /LPF
IMM GRANULOCYTES # BLD AUTO: 0.02 10*3/MM3 (ref 0–0.05)
IMM GRANULOCYTES NFR BLD AUTO: 0.2 % (ref 0–0.5)
INHALED O2 CONCENTRATION: 21 %
IPAP: 0
KETONES UR QL STRIP: ABNORMAL
LEUKOCYTE ESTERASE UR QL STRIP.AUTO: NEGATIVE
LIPASE SERPL-CCNC: 12 U/L (ref 13–60)
LYMPHOCYTES # BLD AUTO: 0.51 10*3/MM3 (ref 0.7–3.1)
LYMPHOCYTES NFR BLD AUTO: 6.1 % (ref 19.6–45.3)
MCH RBC QN AUTO: 29.6 PG (ref 26.6–33)
MCHC RBC AUTO-ENTMCNC: 34.4 G/DL (ref 31.5–35.7)
MCV RBC AUTO: 86 FL (ref 79–97)
METHGB BLD QL: 0.5 %
MODALITY: ABNORMAL
MONOCYTES # BLD AUTO: 0.49 10*3/MM3 (ref 0.1–0.9)
MONOCYTES NFR BLD AUTO: 5.8 % (ref 5–12)
NEUTROPHILS NFR BLD AUTO: 7.37 10*3/MM3 (ref 1.7–7)
NEUTROPHILS NFR BLD AUTO: 87.8 % (ref 42.7–76)
NITRITE UR QL STRIP: NEGATIVE
NOTE: ABNORMAL
NRBC BLD AUTO-RTO: 0 /100 WBC (ref 0–0.2)
OXYHGB MFR BLDV: 77.2 %
PAW @ PEAK INSP FLOW SETTING VENT: 0 CMH2O
PCO2 BLDV: 43.1 MM HG (ref 41–51)
PH BLDV: 7.43 PH UNITS (ref 7.31–7.41)
PH UR STRIP.AUTO: 6 [PH] (ref 5–8)
PLATELET # BLD AUTO: 323 10*3/MM3 (ref 140–450)
PMV BLD AUTO: 10.6 FL (ref 6–12)
PO2 BLDV: 40.6 MM HG (ref 27–53)
POTASSIUM SERPL-SCNC: 3.7 MMOL/L (ref 3.5–5.2)
PROT SERPL-MCNC: 8.1 G/DL (ref 6–8.5)
PROT UR QL STRIP: ABNORMAL
RBC # BLD AUTO: 5.23 10*6/MM3 (ref 4.14–5.8)
RBC # UR STRIP: NORMAL /HPF
REF LAB TEST METHOD: NORMAL
SODIUM SERPL-SCNC: 141 MMOL/L (ref 136–145)
SP GR UR STRIP: 1.04 (ref 1–1.03)
SQUAMOUS #/AREA URNS HPF: NORMAL /HPF
TOTAL RATE: 0 BREATHS/MINUTE
TROPONIN T SERPL HS-MCNC: <6 NG/L
UROBILINOGEN UR QL STRIP: ABNORMAL
WBC # UR STRIP: NORMAL /HPF
WBC NRBC COR # BLD: 8.4 10*3/MM3 (ref 3.4–10.8)

## 2023-05-07 PROCEDURE — 84484 ASSAY OF TROPONIN QUANT: CPT | Performed by: PHYSICIAN ASSISTANT

## 2023-05-07 PROCEDURE — 81001 URINALYSIS AUTO W/SCOPE: CPT | Performed by: PHYSICIAN ASSISTANT

## 2023-05-07 PROCEDURE — 99284 EMERGENCY DEPT VISIT MOD MDM: CPT

## 2023-05-07 PROCEDURE — 63710000001 INSULIN REGULAR HUMAN PER 5 UNITS: Performed by: PHYSICIAN ASSISTANT

## 2023-05-07 PROCEDURE — 80053 COMPREHEN METABOLIC PANEL: CPT | Performed by: PHYSICIAN ASSISTANT

## 2023-05-07 PROCEDURE — 83690 ASSAY OF LIPASE: CPT | Performed by: PHYSICIAN ASSISTANT

## 2023-05-07 PROCEDURE — 96374 THER/PROPH/DIAG INJ IV PUSH: CPT

## 2023-05-07 PROCEDURE — 36415 COLL VENOUS BLD VENIPUNCTURE: CPT

## 2023-05-07 PROCEDURE — 85025 COMPLETE CBC W/AUTO DIFF WBC: CPT | Performed by: PHYSICIAN ASSISTANT

## 2023-05-07 PROCEDURE — 83605 ASSAY OF LACTIC ACID: CPT | Performed by: PHYSICIAN ASSISTANT

## 2023-05-07 PROCEDURE — 25010000002 ONDANSETRON PER 1 MG: Performed by: PHYSICIAN ASSISTANT

## 2023-05-07 PROCEDURE — 93005 ELECTROCARDIOGRAM TRACING: CPT | Performed by: PHYSICIAN ASSISTANT

## 2023-05-07 PROCEDURE — 82010 KETONE BODYS QUAN: CPT | Performed by: PHYSICIAN ASSISTANT

## 2023-05-07 PROCEDURE — 82948 REAGENT STRIP/BLOOD GLUCOSE: CPT

## 2023-05-07 PROCEDURE — 83036 HEMOGLOBIN GLYCOSYLATED A1C: CPT | Performed by: PHYSICIAN ASSISTANT

## 2023-05-07 PROCEDURE — 96361 HYDRATE IV INFUSION ADD-ON: CPT

## 2023-05-07 PROCEDURE — 82805 BLOOD GASES W/O2 SATURATION: CPT

## 2023-05-07 RX ORDER — ONDANSETRON 2 MG/ML
4 INJECTION INTRAMUSCULAR; INTRAVENOUS ONCE
Status: COMPLETED | OUTPATIENT
Start: 2023-05-07 | End: 2023-05-07

## 2023-05-07 RX ORDER — LISINOPRIL 10 MG/1
10 TABLET ORAL ONCE
Status: COMPLETED | OUTPATIENT
Start: 2023-05-07 | End: 2023-05-07

## 2023-05-07 RX ORDER — LISINOPRIL 10 MG/1
10 TABLET ORAL DAILY
Qty: 30 TABLET | Refills: 0 | Status: SHIPPED | OUTPATIENT
Start: 2023-05-07

## 2023-05-07 RX ORDER — ONDANSETRON 4 MG/1
4 TABLET, ORALLY DISINTEGRATING ORAL EVERY 6 HOURS PRN
Qty: 15 TABLET | Refills: 0 | Status: SHIPPED | OUTPATIENT
Start: 2023-05-07

## 2023-05-07 RX ORDER — SODIUM CHLORIDE 0.9 % (FLUSH) 0.9 %
10 SYRINGE (ML) INJECTION AS NEEDED
Status: DISCONTINUED | OUTPATIENT
Start: 2023-05-07 | End: 2023-05-07 | Stop reason: HOSPADM

## 2023-05-07 RX ADMIN — SODIUM CHLORIDE 2000 ML: 9 INJECTION, SOLUTION INTRAVENOUS at 10:18

## 2023-05-07 RX ADMIN — ONDANSETRON 4 MG: 2 INJECTION INTRAMUSCULAR; INTRAVENOUS at 10:18

## 2023-05-07 RX ADMIN — INSULIN HUMAN 3 UNITS: 100 INJECTION, SOLUTION PARENTERAL at 13:53

## 2023-05-07 RX ADMIN — LISINOPRIL 10 MG: 10 TABLET ORAL at 14:55

## 2023-05-07 NOTE — Clinical Note
[facility] EMERGENCY DEPARTMENT  [address] [address]  Phone: [phone]    Ford Mukherjee was seen and treated in our emergency department on 5/7/2023.  He may return to work on 05/10/2023.         Thank you for choosing [facility].    Dejan Peoples MD

## 2023-05-07 NOTE — DISCHARGE INSTRUCTIONS
Rest.  Plenty of fluids.  Resume your prescribed medications.  Zofran as prescribed for nausea.  Lisinopril as prescribed for high blood pressure.  Measure your blood sugar 3 times daily and keep a log of it to take to your PCP.  Keep a log of blood pressures as well.  Follow up with your PCP.  Return to ER if worse.

## 2023-05-07 NOTE — ED PROVIDER NOTES
Subjective   History of Present Illness  Mr. Mukherjee is a 28-year-old male with a history of IDDM type II and prior GI bleed, who presents to the emergency department with a 24-hour history of nausea and vomiting.  The patient states he is unable to keep anything down.  He denies any associated abdominal pain.  No diarrhea.  No urinary symptoms.  The patient's father states that the patient is not very compliant with his insulin and does not typically check his sugars.  The patient states he took his last dose of insulin last night.        Review of Systems   Constitutional: Positive for appetite change. Negative for chills and fever.   HENT: Negative for sore throat.    Respiratory: Negative for cough and shortness of breath.    Cardiovascular: Negative for chest pain.   Gastrointestinal: Positive for nausea and vomiting. Negative for abdominal pain, constipation and diarrhea.   Endocrine: Positive for polydipsia. Negative for polyphagia and polyuria.   Genitourinary: Negative for dysuria.   Musculoskeletal: Negative for back pain.   Skin: Negative for color change and pallor.   Allergic/Immunologic: Negative for immunocompromised state.   Neurological: Positive for weakness (generalized) and light-headedness.   Hematological: Negative.    Psychiatric/Behavioral: Negative.        Past Medical History:   Diagnosis Date   • Diabetes mellitus     PT DENIES THIS DX, PREVIOUS DOCUMENTED   • GI (gastrointestinal bleed)        No Known Allergies    History reviewed. No pertinent surgical history.    Family History   Problem Relation Age of Onset   • Diabetes Mother    • No Known Problems Father    • Colon cancer Neg Hx    • Esophageal cancer Neg Hx    • Inflammatory bowel disease Neg Hx    • Irritable bowel syndrome Neg Hx    • Ulcerative colitis Neg Hx        Social History     Socioeconomic History   • Marital status: Single   Tobacco Use   • Smoking status: Former   • Smokeless tobacco: Never   Vaping Use   • Vaping  Use: Never used   Substance and Sexual Activity   • Alcohol use: Yes     Comment: SOCIALLY   • Drug use: No   • Sexual activity: Yes     Partners: Female           Objective   Physical Exam  Constitutional:       Appearance: He is ill-appearing. He is not toxic-appearing or diaphoretic.   HENT:      Head: Normocephalic.      Nose: Nose normal.      Mouth/Throat:      Mouth: Mucous membranes are dry.      Pharynx: Oropharynx is clear. No oropharyngeal exudate.   Eyes:      General: No scleral icterus.     Extraocular Movements: Extraocular movements intact.      Conjunctiva/sclera: Conjunctivae normal.      Pupils: Pupils are equal, round, and reactive to light.   Cardiovascular:      Rate and Rhythm: Normal rate and regular rhythm.      Pulses: Normal pulses.      Heart sounds: Normal heart sounds.   Pulmonary:      Effort: Pulmonary effort is normal.      Breath sounds: Normal breath sounds.   Abdominal:      General: Bowel sounds are normal.      Tenderness: There is no abdominal tenderness. There is no guarding or rebound.      Hernia: No hernia is present.   Musculoskeletal:         General: No tenderness. Normal range of motion.      Cervical back: Normal range of motion and neck supple.      Right lower leg: No edema.      Left lower leg: No edema.   Skin:     General: Skin is warm and dry.      Coloration: Skin is not jaundiced or pale.   Neurological:      General: No focal deficit present.      Mental Status: He is alert and oriented to person, place, and time.   Psychiatric:         Mood and Affect: Mood normal.         Procedures           ED Course      In summary, 28-year-old male with history of IDDM type II and prior GI bleed, presents to the emergency department with a 24-hour history of nausea and vomiting.  He states he is unable to keep anything down.  He has no associated abdominal pain.  No dysuria.  No fever or chills.  He admits that he is fairly noncompliant with his insulin and does not  check his sugar regularly.    MDM: Differential includes DKA, HHS, gastroenteritis, electrolyte abnormality, dehydration, etc.    Labs and urinalysis obtained.  Patient started on IV fluids and a dose of Zofran.    Glucose is 298.  No concerning chemisties.  Normal creatinine at 0.69.    Lactatic acid is 32.1.  HgA1C is 10.10.    VBG shows pH of 7.429.    Awaiting CBC.    Normal white count at 8.4.  No anemia.    EKG read by me shows normal sinus rhythm with a rate of 84 with no ischemia.    HS Troponin is negative at less that 6.      14:11 EDT  Pt states his nausea has resolved.  He is feeling much better after fluids.  I spoke with him about admission for intractable nausea and vomiting with ketosis and hyperglycemia in the setting of medication non-compliance.  He states he wants to go home and will restart his meds and will be compliant with them.    His BP is up in the 170s.  He is not on any BP meds.  I will give a small dose of Lisinopril and plan to give him an Rx for same.  Will d/c home to f/u with his PCP or return if worse.                                                       MDM    Final diagnoses:   Nausea and vomiting, unspecified vomiting type   Type 2 diabetes mellitus with ketosis   Non compliance w medication regimen   Type 2 diabetes mellitus with hyperglycemia, with long-term current use of insulin       ED Disposition  ED Disposition     ED Disposition   Discharge    Condition   Stable    Comment   --             Shalini Goodson, APRN  [address] [facility] [address] [phone]      call tomorrow for follow up in office    [facility] Emergency Department  [address] [address] [phone]    If symptoms worsen         Medication List      New Prescriptions    lisinopril 10 MG tablet  Commonly known as: PRINIVIL,ZESTRIL  Take 1 tablet by mouth Daily.        Changed    ondansetron ODT 4 MG disintegrating tablet  Commonly known as:  ZOFRAN-ODT  Place 1 tablet on the tongue Every 6 (Six) Hours As Needed for Nausea.  What changed:   · medication strength  · how much to take  · when to take this  · reasons to take this           Where to Get Your Medications      These medications were sent to [id number] - [place], [place] - [address] [place] [address]  AT [facility] - [phone]  - [phone] [address] [facility] [address]    Phone: [phone]   · lisinopril 10 MG tablet  · ondansetron ODT 4 MG disintegrating tablet          Alan Pappas, PA  05/07/23 1403       Alan Pappas PA  05/07/23 1405       Alan Pappas PA  05/07/23 1411

## 2023-05-09 LAB
QT INTERVAL: 382 MS
QTC INTERVAL: 451 MS

## 2023-08-02 ENCOUNTER — HOSPITAL ENCOUNTER (EMERGENCY)
Facility: HOSPITAL | Age: 28
Discharge: HOME | End: 2023-08-02
Attending: EMERGENCY MEDICINE
Payer: MEDICAID

## 2023-08-02 VITALS
HEIGHT: 71 IN | RESPIRATION RATE: 16 BRPM | SYSTOLIC BLOOD PRESSURE: 160 MMHG | BODY MASS INDEX: 21 KG/M2 | DIASTOLIC BLOOD PRESSURE: 101 MMHG | TEMPERATURE: 98.3 F | HEART RATE: 125 BPM | OXYGEN SATURATION: 97 % | WEIGHT: 150 LBS

## 2023-08-02 DIAGNOSIS — F12.90 CANNABINOID HYPEREMESIS SYNDROME: Primary | ICD-10-CM

## 2023-08-02 DIAGNOSIS — I10 ELEVATED BLOOD PRESSURE READING WITH DIAGNOSIS OF HYPERTENSION: ICD-10-CM

## 2023-08-02 DIAGNOSIS — E11.9 DIABETES MELLITUS TYPE 2 IN NONOBESE: ICD-10-CM

## 2023-08-02 DIAGNOSIS — R11.2 NAUSEA AND VOMITING, UNSPECIFIED VOMITING TYPE: ICD-10-CM

## 2023-08-02 DIAGNOSIS — R11.2 CANNABINOID HYPEREMESIS SYNDROME: Primary | ICD-10-CM

## 2023-08-02 LAB
ALBUMIN SERPL-MCNC: 5.3 G/DL (ref 3.5–5.2)
ALBUMIN/GLOB SERPL: 1.6 G/DL
ALP SERPL-CCNC: 78 U/L (ref 39–117)
ALT SERPL W P-5'-P-CCNC: 12 U/L (ref 1–41)
AMPHET+METHAMPHET UR QL: NEGATIVE
AMPHETAMINES UR QL: NEGATIVE
ANION GAP SERPL CALCULATED.3IONS-SCNC: 16 MMOL/L (ref 5–15)
AST SERPL-CCNC: 14 U/L (ref 1–40)
ATMOSPHERIC PRESS: ABNORMAL MM[HG]
B-OH-BUTYR SERPL-SCNC: 1.22 MMOL/L (ref 0.02–0.27)
BACTERIA UR QL AUTO: ABNORMAL /HPF
BARBITURATES UR QL SCN: NEGATIVE
BASE EXCESS BLDV CALC-SCNC: 1.7 MMOL/L (ref -2–2)
BASOPHILS # BLD AUTO: 0 10*3/MM3 (ref 0–0.2)
BASOPHILS NFR BLD AUTO: 0 % (ref 0–1.5)
BDY SITE: ABNORMAL
BENZODIAZ UR QL SCN: NEGATIVE
BILIRUB SERPL-MCNC: 0.7 MG/DL (ref 0–1.2)
BILIRUB UR QL STRIP: NEGATIVE
BODY TEMPERATURE: 37 C
BUN SERPL-MCNC: 21 MG/DL (ref 6–20)
BUN/CREAT SERPL: 31.8 (ref 7–25)
BUPRENORPHINE SERPL-MCNC: NEGATIVE NG/ML
CALCIUM SPEC-SCNC: 10.3 MG/DL (ref 8.6–10.5)
CANNABINOIDS SERPL QL: POSITIVE
CHLORIDE SERPL-SCNC: 102 MMOL/L (ref 98–107)
CLARITY UR: CLEAR
CO2 BLDA-SCNC: 27.5 MMOL/L (ref 22–33)
CO2 SERPL-SCNC: 24 MMOL/L (ref 22–29)
COCAINE UR QL: NEGATIVE
COHGB MFR BLD: 0.9 %
COLOR UR: YELLOW
CREAT SERPL-MCNC: 0.66 MG/DL (ref 0.76–1.27)
DEPRECATED RDW RBC AUTO: 39.8 FL (ref 37–54)
EGFRCR SERPLBLD CKD-EPI 2021: 131.8 ML/MIN/1.73
EOSINOPHIL # BLD AUTO: 0 10*3/MM3 (ref 0–0.4)
EOSINOPHIL NFR BLD AUTO: 0 % (ref 0.3–6.2)
EPAP: 0
ERYTHROCYTE [DISTWIDTH] IN BLOOD BY AUTOMATED COUNT: 12.4 % (ref 12.3–15.4)
FENTANYL UR-MCNC: NEGATIVE NG/ML
GLOBULIN UR ELPH-MCNC: 3.3 GM/DL
GLUCOSE SERPL-MCNC: 164 MG/DL (ref 65–99)
GLUCOSE UR STRIP-MCNC: NEGATIVE MG/DL
HCO3 BLDV-SCNC: 26.2 MMOL/L (ref 22–28)
HCT VFR BLD AUTO: 44.7 % (ref 37.5–51)
HGB BLD-MCNC: 15.5 G/DL (ref 13–17.7)
HGB BLDA-MCNC: 15.6 G/DL (ref 13.5–17.5)
HGB UR QL STRIP.AUTO: NEGATIVE
HYALINE CASTS UR QL AUTO: ABNORMAL /LPF
IMM GRANULOCYTES # BLD AUTO: 0.06 10*3/MM3 (ref 0–0.05)
IMM GRANULOCYTES NFR BLD AUTO: 0.6 % (ref 0–0.5)
INHALED O2 CONCENTRATION: 21 %
IPAP: 0
KETONES UR QL STRIP: ABNORMAL
LEUKOCYTE ESTERASE UR QL STRIP.AUTO: NEGATIVE
LIPASE SERPL-CCNC: 9 U/L (ref 13–60)
LYMPHOCYTES # BLD AUTO: 0.53 10*3/MM3 (ref 0.7–3.1)
LYMPHOCYTES NFR BLD AUTO: 5.7 % (ref 19.6–45.3)
MCH RBC QN AUTO: 30.4 PG (ref 26.6–33)
MCHC RBC AUTO-ENTMCNC: 34.7 G/DL (ref 31.5–35.7)
MCV RBC AUTO: 87.6 FL (ref 79–97)
METHADONE UR QL SCN: NEGATIVE
METHGB BLD QL: 0.4 %
MODALITY: ABNORMAL
MONOCYTES # BLD AUTO: 0.39 10*3/MM3 (ref 0.1–0.9)
MONOCYTES NFR BLD AUTO: 4.2 % (ref 5–12)
NEUTROPHILS NFR BLD AUTO: 8.27 10*3/MM3 (ref 1.7–7)
NEUTROPHILS NFR BLD AUTO: 89.5 % (ref 42.7–76)
NITRITE UR QL STRIP: NEGATIVE
NOTE: ABNORMAL
NRBC BLD AUTO-RTO: 0 /100 WBC (ref 0–0.2)
OPIATES UR QL: NEGATIVE
OXYCODONE UR QL SCN: NEGATIVE
OXYHGB MFR BLDV: 93.2 %
PAW @ PEAK INSP FLOW SETTING VENT: 0 CMH2O
PCO2 BLDV: 40.2 MM HG (ref 41–51)
PCP UR QL SCN: NEGATIVE
PH BLDV: 7.42 PH UNITS (ref 7.31–7.41)
PH UR STRIP.AUTO: 6 [PH] (ref 5–8)
PLATELET # BLD AUTO: 256 10*3/MM3 (ref 140–450)
PMV BLD AUTO: 9.8 FL (ref 6–12)
PO2 BLDV: 70.7 MM HG (ref 27–53)
POTASSIUM SERPL-SCNC: 3.9 MMOL/L (ref 3.5–5.2)
PROPOXYPH UR QL: NEGATIVE
PROT SERPL-MCNC: 8.6 G/DL (ref 6–8.5)
PROT UR QL STRIP: ABNORMAL
RBC # BLD AUTO: 5.1 10*6/MM3 (ref 4.14–5.8)
RBC # UR STRIP: ABNORMAL /HPF
REF LAB TEST METHOD: ABNORMAL
SODIUM SERPL-SCNC: 142 MMOL/L (ref 136–145)
SP GR UR STRIP: 1.04 (ref 1–1.03)
SQUAMOUS #/AREA URNS HPF: ABNORMAL /HPF
TOTAL RATE: 0 BREATHS/MINUTE
TRICYCLICS UR QL SCN: NEGATIVE
UROBILINOGEN UR QL STRIP: ABNORMAL
WBC # UR STRIP: ABNORMAL /HPF
WBC NRBC COR # BLD: 9.25 10*3/MM3 (ref 3.4–10.8)

## 2023-08-02 PROCEDURE — 83690 ASSAY OF LIPASE: CPT | Performed by: EMERGENCY MEDICINE

## 2023-08-02 PROCEDURE — 96376 TX/PRO/DX INJ SAME DRUG ADON: CPT

## 2023-08-02 PROCEDURE — 85025 COMPLETE CBC W/AUTO DIFF WBC: CPT | Performed by: EMERGENCY MEDICINE

## 2023-08-02 PROCEDURE — 25010000002 DROPERIDOL PER 5 MG: Performed by: EMERGENCY MEDICINE

## 2023-08-02 PROCEDURE — 81001 URINALYSIS AUTO W/SCOPE: CPT | Performed by: EMERGENCY MEDICINE

## 2023-08-02 PROCEDURE — 82010 KETONE BODYS QUAN: CPT | Performed by: EMERGENCY MEDICINE

## 2023-08-02 PROCEDURE — 96374 THER/PROPH/DIAG INJ IV PUSH: CPT

## 2023-08-02 PROCEDURE — 99283 EMERGENCY DEPT VISIT LOW MDM: CPT

## 2023-08-02 PROCEDURE — 80307 DRUG TEST PRSMV CHEM ANLYZR: CPT | Performed by: EMERGENCY MEDICINE

## 2023-08-02 PROCEDURE — 80053 COMPREHEN METABOLIC PANEL: CPT | Performed by: EMERGENCY MEDICINE

## 2023-08-02 PROCEDURE — 96375 TX/PRO/DX INJ NEW DRUG ADDON: CPT

## 2023-08-02 PROCEDURE — 82805 BLOOD GASES W/O2 SATURATION: CPT

## 2023-08-02 RX ORDER — DICYCLOMINE HYDROCHLORIDE 10 MG/1
20 CAPSULE ORAL ONCE
Status: COMPLETED | OUTPATIENT
Start: 2023-08-02 | End: 2023-08-02

## 2023-08-02 RX ORDER — DROPERIDOL 2.5 MG/ML
2.5 INJECTION, SOLUTION INTRAMUSCULAR; INTRAVENOUS ONCE
Status: COMPLETED | OUTPATIENT
Start: 2023-08-02 | End: 2023-08-02

## 2023-08-02 RX ORDER — SODIUM CHLORIDE 0.9 % (FLUSH) 0.9 %
10 SYRINGE (ML) INJECTION AS NEEDED
Status: DISCONTINUED | OUTPATIENT
Start: 2023-08-02 | End: 2023-08-02 | Stop reason: HOSPADM

## 2023-08-02 RX ORDER — ONDANSETRON 8 MG/1
8 TABLET, ORALLY DISINTEGRATING ORAL EVERY 8 HOURS PRN
Qty: 15 TABLET | Refills: 0 | Status: SHIPPED | OUTPATIENT
Start: 2023-08-02

## 2023-08-02 RX ORDER — FAMOTIDINE 10 MG/ML
20 INJECTION, SOLUTION INTRAVENOUS ONCE
Status: COMPLETED | OUTPATIENT
Start: 2023-08-02 | End: 2023-08-02

## 2023-08-02 RX ORDER — DROPERIDOL 2.5 MG/ML
1.25 INJECTION, SOLUTION INTRAMUSCULAR; INTRAVENOUS ONCE
Status: COMPLETED | OUTPATIENT
Start: 2023-08-02 | End: 2023-08-02

## 2023-08-02 RX ADMIN — DROPERIDOL 1.25 MG: 2.5 INJECTION, SOLUTION INTRAMUSCULAR; INTRAVENOUS at 15:40

## 2023-08-02 RX ADMIN — DROPERIDOL 2.5 MG: 2.5 INJECTION, SOLUTION INTRAMUSCULAR; INTRAVENOUS at 14:42

## 2023-08-02 RX ADMIN — FAMOTIDINE 20 MG: 10 INJECTION INTRAVENOUS at 14:42

## 2023-08-02 RX ADMIN — DICYCLOMINE HYDROCHLORIDE 20 MG: 10 CAPSULE ORAL at 14:42

## 2023-08-02 RX ADMIN — SODIUM CHLORIDE 2000 ML: 9 INJECTION, SOLUTION INTRAVENOUS at 14:49

## 2023-08-02 NOTE — ED PROVIDER NOTES
Subjective   History of Present Illness  Patient is a 28-year-old male presenting to the emergency department with nausea and vomiting with decreased p.o. intake.  Patient also reports generalized abdominal discomfort and cramping.  Patient has a history of similar associated with prior diagnosis of diabetes mellitus type 2.  Patient denies any fever or chills.  Patient reports this feels similar to prior episodes.    History provided by:  Patient    Review of Systems    Past Medical History:   Diagnosis Date    Diabetes mellitus     PT DENIES THIS DX, PREVIOUS DOCUMENTED    GI (gastrointestinal bleed)        No Known Allergies    No past surgical history on file.    Family History   Problem Relation Age of Onset    Diabetes Mother     No Known Problems Father     Colon cancer Neg Hx     Esophageal cancer Neg Hx     Inflammatory bowel disease Neg Hx     Irritable bowel syndrome Neg Hx     Ulcerative colitis Neg Hx        Social History     Socioeconomic History    Marital status: Single   Tobacco Use    Smoking status: Former    Smokeless tobacco: Never   Vaping Use    Vaping Use: Never used   Substance and Sexual Activity    Alcohol use: Yes     Comment: SOCIALLY    Drug use: No    Sexual activity: Yes     Partners: Female           Objective   Physical Exam  Vitals and nursing note reviewed.   Constitutional:       General: He is not in acute distress.     Appearance: He is well-developed. He is ill-appearing. He is not toxic-appearing.   Cardiovascular:      Rate and Rhythm: Regular rhythm. Tachycardia present.   Pulmonary:      Effort: Pulmonary effort is normal. No respiratory distress.   Abdominal:      Palpations: Abdomen is soft.      Tenderness: There is generalized abdominal tenderness. There is no guarding.   Neurological:      Mental Status: He is alert and oriented to person, place, and time.   Psychiatric:         Mood and Affect: Mood normal.         Behavior: Behavior normal.       Procedures            ED Course  ED Course as of 08/02/23 2102   Wed Aug 02, 2023   1405 THC Screen, Urine(!): Positive  Urine tox screen positive for THC. [RS]   1405 CO2: 24.0  No evidence of metabolic acidosis. [RS]   1519 Patient resting comfortably and does report some improvement in symptoms.  I did talk with him about the findings of the labs including advised against the use of cannabinoid containing products moving forward.  Patient with no evidence of significant complications associated with his diabetes at this time.  Patient voiced understanding and agreed. I have reviewed results, considerations, and diagnosis with the patient and/or their representative. Anticipatory guidance provided. Follow-up plan reviewed. Precautions for acute return for re-evaluations also reviewed. This including potential for worsening of the presenting condition and need for further evaluation, admission, and/or intervention as indicated. Opportunity to as questions provided. I advised them to return for any concerns and stressed the importance of timely follow-up and outpatient services. They verbalized understanding.   [RS]      ED Course User Index  [RS] Bharathi So MD                                           Medical Decision Making  Problems Addressed:  Cannabinoid hyperemesis syndrome: complicated acute illness or injury  Diabetes mellitus type 2 in nonobese: complicated acute illness or injury  Elevated blood pressure reading with diagnosis of hypertension: complicated acute illness or injury  Nausea and vomiting, unspecified vomiting type: complicated acute illness or injury    Amount and/or Complexity of Data Reviewed  External Data Reviewed: notes.  Labs: ordered. Decision-making details documented in ED Course.    Risk  Prescription drug management.        Final diagnoses:   Cannabinoid hyperemesis syndrome   Diabetes mellitus type 2 in nonobese   Elevated blood pressure reading with diagnosis of hypertension   Nausea  and vomiting, unspecified vomiting type       ED Disposition  ED Disposition       ED Disposition   Discharge    Condition   Stable    Comment   --               Shalini Goodson, APRN  [address] [facility] [address]  [phone]    Schedule an appointment as soon as possible for a visit       [facility] EMERGENCY DEPARTMENT  [address]  [facility] [address] [phone]    As needed, If symptoms worsen or ANY concerns.         Medication List        New Prescriptions      hyoscyamine 0.125 MG SL tablet  Commonly known as: LEVSIN  Take 1 tablet by mouth Every 4 (Four) Hours As Needed for Cramping.            Changed      ondansetron ODT 8 MG disintegrating tablet  Commonly known as: ZOFRAN-ODT  Place 1 tablet on the tongue Every 8 (Eight) Hours As Needed for Nausea or Vomiting.  What changed:   medication strength  how much to take  when to take this  reasons to take this            Stop      pantoprazole 40 MG EC tablet  Commonly known as: PROTONIX               Where to Get Your Medications        These medications were sent to [id number] - [place], [place] - [address]  AT [facility] - [phone]  - [phone] FX  [address] , [facility] [address]      Phone: [phone]   hyoscyamine 0.125 MG SL tablet  ondansetron ODT 8 MG disintegrating tablet            Bharathi So MD  08/02/23 3010

## 2023-09-19 ENCOUNTER — APPOINTMENT (OUTPATIENT)
Dept: MRI IMAGING | Facility: HOSPITAL | Age: 28
End: 2023-09-19
Payer: MEDICAID

## 2023-09-19 ENCOUNTER — APPOINTMENT (OUTPATIENT)
Dept: GENERAL RADIOLOGY | Facility: HOSPITAL | Age: 28
End: 2023-09-19
Payer: MEDICAID

## 2023-09-19 ENCOUNTER — HOSPITAL ENCOUNTER (OUTPATIENT)
Facility: HOSPITAL | Age: 28
Setting detail: OBSERVATION
LOS: 2 days | Discharge: HOME | End: 2023-09-22
Attending: EMERGENCY MEDICINE | Admitting: FAMILY MEDICINE
Payer: MEDICAID

## 2023-09-19 ENCOUNTER — APPOINTMENT (OUTPATIENT)
Dept: CT IMAGING | Facility: HOSPITAL | Age: 28
End: 2023-09-19
Payer: MEDICAID

## 2023-09-19 DIAGNOSIS — R11.2 INTRACTABLE NAUSEA AND VOMITING: Primary | ICD-10-CM

## 2023-09-19 DIAGNOSIS — F12.288 CANNABIS HYPEREMESIS SYNDROME CONCURRENT WITH AND DUE TO CANNABIS DEPENDENCE: ICD-10-CM

## 2023-09-19 DIAGNOSIS — E11.65 TYPE 2 DIABETES MELLITUS WITH HYPERGLYCEMIA, WITH LONG-TERM CURRENT USE OF INSULIN: ICD-10-CM

## 2023-09-19 DIAGNOSIS — E87.20 LACTIC ACIDOSIS: ICD-10-CM

## 2023-09-19 DIAGNOSIS — Z79.4 TYPE 2 DIABETES MELLITUS WITH HYPERGLYCEMIA, WITH LONG-TERM CURRENT USE OF INSULIN: ICD-10-CM

## 2023-09-19 DIAGNOSIS — L08.9 TOE INFECTION: ICD-10-CM

## 2023-09-19 DIAGNOSIS — R73.9 HYPERGLYCEMIA: ICD-10-CM

## 2023-09-19 LAB
ALBUMIN SERPL-MCNC: 5.5 G/DL (ref 3.5–5.2)
ALBUMIN/GLOB SERPL: 2 G/DL
ALP SERPL-CCNC: 82 U/L (ref 39–117)
ALT SERPL W P-5'-P-CCNC: 15 U/L (ref 1–41)
AMPHET+METHAMPHET UR QL: NEGATIVE
AMPHETAMINES UR QL: NEGATIVE
ANION GAP SERPL CALCULATED.3IONS-SCNC: 17 MMOL/L (ref 5–15)
AST SERPL-CCNC: 11 U/L (ref 1–40)
ATMOSPHERIC PRESS: ABNORMAL MM[HG]
B PARAPERT DNA SPEC QL NAA+PROBE: NOT DETECTED
B PERT DNA SPEC QL NAA+PROBE: NOT DETECTED
B-OH-BUTYR SERPL-SCNC: 0.46 MMOL/L (ref 0.02–0.27)
BACTERIA UR QL AUTO: NORMAL /HPF
BARBITURATES UR QL SCN: NEGATIVE
BASE EXCESS BLDV CALC-SCNC: 9.6 MMOL/L (ref -2–2)
BASOPHILS # BLD AUTO: 0.01 10*3/MM3 (ref 0–0.2)
BASOPHILS NFR BLD AUTO: 0.1 % (ref 0–1.5)
BDY SITE: ABNORMAL
BENZODIAZ UR QL SCN: NEGATIVE
BILIRUB SERPL-MCNC: 1 MG/DL (ref 0–1.2)
BILIRUB UR QL STRIP: NEGATIVE
BODY TEMPERATURE: 37 C
BUN SERPL-MCNC: 29 MG/DL (ref 6–20)
BUN/CREAT SERPL: 34.9 (ref 7–25)
BUPRENORPHINE SERPL-MCNC: NEGATIVE NG/ML
C PNEUM DNA NPH QL NAA+NON-PROBE: NOT DETECTED
CALCIUM SPEC-SCNC: 10.7 MG/DL (ref 8.6–10.5)
CANNABINOIDS SERPL QL: POSITIVE
CHLORIDE SERPL-SCNC: 93 MMOL/L (ref 98–107)
CK SERPL-CCNC: 29 U/L (ref 20–200)
CLARITY UR: CLEAR
CO2 BLDA-SCNC: 37.1 MMOL/L (ref 22–33)
CO2 SERPL-SCNC: 32 MMOL/L (ref 22–29)
COCAINE UR QL: NEGATIVE
COHGB MFR BLD: 1.3 %
COLOR UR: YELLOW
CREAT SERPL-MCNC: 0.83 MG/DL (ref 0.76–1.27)
D-LACTATE SERPL-SCNC: 1.8 MMOL/L (ref 0.5–2)
D-LACTATE SERPL-SCNC: 2.2 MMOL/L (ref 0.5–2)
D-LACTATE SERPL-SCNC: 2.9 MMOL/L (ref 0.5–2)
D-LACTATE SERPL-SCNC: 3.3 MMOL/L (ref 0.5–2)
DEPRECATED RDW RBC AUTO: 38.9 FL (ref 37–54)
EGFRCR SERPLBLD CKD-EPI 2021: 123 ML/MIN/1.73
EOSINOPHIL # BLD AUTO: 0.01 10*3/MM3 (ref 0–0.4)
EOSINOPHIL NFR BLD AUTO: 0.1 % (ref 0.3–6.2)
EPAP: 0
ERYTHROCYTE [DISTWIDTH] IN BLOOD BY AUTOMATED COUNT: 12.5 % (ref 12.3–15.4)
ETHANOL BLD-MCNC: <10 MG/DL (ref 0–10)
FENTANYL UR-MCNC: NEGATIVE NG/ML
FLUAV SUBTYP SPEC NAA+PROBE: NOT DETECTED
FLUBV RNA ISLT QL NAA+PROBE: NOT DETECTED
GLOBULIN UR ELPH-MCNC: 2.8 GM/DL
GLUCOSE BLDC GLUCOMTR-MCNC: 243 MG/DL (ref 70–130)
GLUCOSE BLDC GLUCOMTR-MCNC: 260 MG/DL (ref 70–130)
GLUCOSE BLDC GLUCOMTR-MCNC: 302 MG/DL (ref 70–130)
GLUCOSE SERPL-MCNC: 361 MG/DL (ref 65–99)
GLUCOSE UR STRIP-MCNC: ABNORMAL MG/DL
HADV DNA SPEC NAA+PROBE: NOT DETECTED
HBA1C MFR BLD: 7.9 % (ref 4.8–5.6)
HCO3 BLDV-SCNC: 35.6 MMOL/L (ref 22–28)
HCOV 229E RNA SPEC QL NAA+PROBE: NOT DETECTED
HCOV HKU1 RNA SPEC QL NAA+PROBE: NOT DETECTED
HCOV NL63 RNA SPEC QL NAA+PROBE: NOT DETECTED
HCOV OC43 RNA SPEC QL NAA+PROBE: NOT DETECTED
HCT VFR BLD AUTO: 44.9 % (ref 37.5–51)
HGB BLD-MCNC: 15.5 G/DL (ref 13–17.7)
HGB BLDA-MCNC: 16.3 G/DL (ref 13.5–17.5)
HGB UR QL STRIP.AUTO: NEGATIVE
HMPV RNA NPH QL NAA+NON-PROBE: NOT DETECTED
HOLD SPECIMEN: NORMAL
HPIV1 RNA ISLT QL NAA+PROBE: NOT DETECTED
HPIV2 RNA SPEC QL NAA+PROBE: NOT DETECTED
HPIV3 RNA NPH QL NAA+PROBE: NOT DETECTED
HPIV4 P GENE NPH QL NAA+PROBE: NOT DETECTED
HYALINE CASTS UR QL AUTO: NORMAL /LPF
IMM GRANULOCYTES # BLD AUTO: 0.03 10*3/MM3 (ref 0–0.05)
IMM GRANULOCYTES NFR BLD AUTO: 0.4 % (ref 0–0.5)
INHALED O2 CONCENTRATION: 21 %
IPAP: 0
KETONES UR QL STRIP: ABNORMAL
LEUKOCYTE ESTERASE UR QL STRIP.AUTO: NEGATIVE
LYMPHOCYTES # BLD AUTO: 0.32 10*3/MM3 (ref 0.7–3.1)
LYMPHOCYTES NFR BLD AUTO: 3.9 % (ref 19.6–45.3)
M PNEUMO IGG SER IA-ACNC: NOT DETECTED
MCH RBC QN AUTO: 29.6 PG (ref 26.6–33)
MCHC RBC AUTO-ENTMCNC: 34.5 G/DL (ref 31.5–35.7)
MCV RBC AUTO: 85.9 FL (ref 79–97)
METHADONE UR QL SCN: NEGATIVE
METHGB BLD QL: 0.4 %
MODALITY: ABNORMAL
MONOCYTES # BLD AUTO: 0.41 10*3/MM3 (ref 0.1–0.9)
MONOCYTES NFR BLD AUTO: 5 % (ref 5–12)
NEUTROPHILS NFR BLD AUTO: 7.45 10*3/MM3 (ref 1.7–7)
NEUTROPHILS NFR BLD AUTO: 90.5 % (ref 42.7–76)
NITRITE UR QL STRIP: NEGATIVE
NRBC BLD AUTO-RTO: 0 /100 WBC (ref 0–0.2)
OPIATES UR QL: NEGATIVE
OXYCODONE UR QL SCN: NEGATIVE
OXYHGB MFR BLDV: 78.9 %
PAW @ PEAK INSP FLOW SETTING VENT: 0 CMH2O
PCO2 BLDV: 50.7 MM HG (ref 41–51)
PCP UR QL SCN: NEGATIVE
PH BLDV: 7.45 PH UNITS (ref 7.31–7.41)
PH UR STRIP.AUTO: 6 [PH] (ref 5–8)
PLATELET # BLD AUTO: 292 10*3/MM3 (ref 140–450)
PMV BLD AUTO: 10.5 FL (ref 6–12)
PO2 BLDV: 47.9 MM HG (ref 27–53)
POTASSIUM SERPL-SCNC: 3.6 MMOL/L (ref 3.5–5.2)
PROPOXYPH UR QL: NEGATIVE
PROT SERPL-MCNC: 8.3 G/DL (ref 6–8.5)
PROT UR QL STRIP: ABNORMAL
RBC # BLD AUTO: 5.23 10*6/MM3 (ref 4.14–5.8)
RBC # UR STRIP: NORMAL /HPF
REF LAB TEST METHOD: NORMAL
RHINOVIRUS RNA SPEC NAA+PROBE: NOT DETECTED
RSV RNA NPH QL NAA+NON-PROBE: NOT DETECTED
SARS-COV-2 RNA NPH QL NAA+NON-PROBE: NOT DETECTED
SODIUM SERPL-SCNC: 142 MMOL/L (ref 136–145)
SP GR UR STRIP: 1.04 (ref 1–1.03)
SQUAMOUS #/AREA URNS HPF: NORMAL /HPF
TOTAL RATE: 0 BREATHS/MINUTE
TRICYCLICS UR QL SCN: NEGATIVE
UROBILINOGEN UR QL STRIP: ABNORMAL
WBC # UR STRIP: NORMAL /HPF
WBC NRBC COR # BLD: 8.23 10*3/MM3 (ref 3.4–10.8)
WHOLE BLOOD HOLD COAG: NORMAL
WHOLE BLOOD HOLD SPECIMEN: NORMAL

## 2023-09-19 PROCEDURE — G0378 HOSPITAL OBSERVATION PER HR: HCPCS

## 2023-09-19 PROCEDURE — 74176 CT ABD & PELVIS W/O CONTRAST: CPT

## 2023-09-19 PROCEDURE — 83036 HEMOGLOBIN GLYCOSYLATED A1C: CPT | Performed by: NURSE PRACTITIONER

## 2023-09-19 PROCEDURE — 73630 X-RAY EXAM OF FOOT: CPT

## 2023-09-19 PROCEDURE — 85025 COMPLETE CBC W/AUTO DIFF WBC: CPT | Performed by: EMERGENCY MEDICINE

## 2023-09-19 PROCEDURE — 25010000002 PROCHLORPERAZINE 10 MG/2ML SOLUTION: Performed by: NURSE PRACTITIONER

## 2023-09-19 PROCEDURE — 82948 REAGENT STRIP/BLOOD GLUCOSE: CPT

## 2023-09-19 PROCEDURE — 0202U NFCT DS 22 TRGT SARS-COV-2: CPT | Performed by: NURSE PRACTITIONER

## 2023-09-19 PROCEDURE — 96375 TX/PRO/DX INJ NEW DRUG ADDON: CPT

## 2023-09-19 PROCEDURE — 93005 ELECTROCARDIOGRAM TRACING: CPT

## 2023-09-19 PROCEDURE — 71045 X-RAY EXAM CHEST 1 VIEW: CPT

## 2023-09-19 PROCEDURE — 63710000001 INSULIN DETEMIR PER 5 UNITS: Performed by: INTERNAL MEDICINE

## 2023-09-19 PROCEDURE — 25010000002 ONDANSETRON PER 1 MG: Performed by: NURSE PRACTITIONER

## 2023-09-19 PROCEDURE — 80307 DRUG TEST PRSMV CHEM ANLYZR: CPT | Performed by: NURSE PRACTITIONER

## 2023-09-19 PROCEDURE — 96374 THER/PROPH/DIAG INJ IV PUSH: CPT

## 2023-09-19 PROCEDURE — 82077 ASSAY SPEC XCP UR&BREATH IA: CPT | Performed by: NURSE PRACTITIONER

## 2023-09-19 PROCEDURE — 82805 BLOOD GASES W/O2 SATURATION: CPT

## 2023-09-19 PROCEDURE — 82550 ASSAY OF CK (CPK): CPT | Performed by: NURSE PRACTITIONER

## 2023-09-19 PROCEDURE — 83605 ASSAY OF LACTIC ACID: CPT | Performed by: NURSE PRACTITIONER

## 2023-09-19 PROCEDURE — 87040 BLOOD CULTURE FOR BACTERIA: CPT | Performed by: INTERNAL MEDICINE

## 2023-09-19 PROCEDURE — 81001 URINALYSIS AUTO W/SCOPE: CPT | Performed by: EMERGENCY MEDICINE

## 2023-09-19 PROCEDURE — 63710000001 INSULIN LISPRO (HUMAN) PER 5 UNITS: Performed by: INTERNAL MEDICINE

## 2023-09-19 PROCEDURE — 82010 KETONE BODYS QUAN: CPT | Performed by: NURSE PRACTITIONER

## 2023-09-19 PROCEDURE — 80053 COMPREHEN METABOLIC PANEL: CPT | Performed by: EMERGENCY MEDICINE

## 2023-09-19 PROCEDURE — 36415 COLL VENOUS BLD VENIPUNCTURE: CPT

## 2023-09-19 PROCEDURE — 99285 EMERGENCY DEPT VISIT HI MDM: CPT

## 2023-09-19 PROCEDURE — 93005 ELECTROCARDIOGRAM TRACING: CPT | Performed by: EMERGENCY MEDICINE

## 2023-09-19 PROCEDURE — 73718 MRI LOWER EXTREMITY W/O DYE: CPT

## 2023-09-19 PROCEDURE — 25010000002 CEFTRIAXONE PER 250 MG: Performed by: INTERNAL MEDICINE

## 2023-09-19 RX ORDER — NICOTINE POLACRILEX 4 MG
15 LOZENGE BUCCAL
Status: DISCONTINUED | OUTPATIENT
Start: 2023-09-19 | End: 2023-09-22 | Stop reason: HOSPADM

## 2023-09-19 RX ORDER — ONDANSETRON 2 MG/ML
4 INJECTION INTRAMUSCULAR; INTRAVENOUS ONCE
Status: COMPLETED | OUTPATIENT
Start: 2023-09-19 | End: 2023-09-19

## 2023-09-19 RX ORDER — NALOXONE HCL 0.4 MG/ML
0.4 VIAL (ML) INJECTION
Status: DISCONTINUED | OUTPATIENT
Start: 2023-09-19 | End: 2023-09-22 | Stop reason: HOSPADM

## 2023-09-19 RX ORDER — ALPRAZOLAM 0.25 MG/1
0.25 TABLET ORAL 2 TIMES DAILY PRN
Status: DISCONTINUED | OUTPATIENT
Start: 2023-09-19 | End: 2023-09-20

## 2023-09-19 RX ORDER — SODIUM CHLORIDE 0.9 % (FLUSH) 0.9 %
10 SYRINGE (ML) INJECTION EVERY 12 HOURS SCHEDULED
Status: DISCONTINUED | OUTPATIENT
Start: 2023-09-19 | End: 2023-09-22 | Stop reason: HOSPADM

## 2023-09-19 RX ORDER — INSULIN LISPRO 100 [IU]/ML
2-7 INJECTION, SOLUTION INTRAVENOUS; SUBCUTANEOUS
Status: DISCONTINUED | OUTPATIENT
Start: 2023-09-19 | End: 2023-09-22 | Stop reason: HOSPADM

## 2023-09-19 RX ORDER — PROCHLORPERAZINE EDISYLATE 5 MG/ML
10 INJECTION INTRAMUSCULAR; INTRAVENOUS ONCE
Status: COMPLETED | OUTPATIENT
Start: 2023-09-19 | End: 2023-09-19

## 2023-09-19 RX ORDER — METOCLOPRAMIDE HYDROCHLORIDE 5 MG/ML
10 INJECTION INTRAMUSCULAR; INTRAVENOUS EVERY 6 HOURS
Status: DISCONTINUED | OUTPATIENT
Start: 2023-09-19 | End: 2023-09-19

## 2023-09-19 RX ORDER — SODIUM CHLORIDE 9 MG/ML
75 INJECTION, SOLUTION INTRAVENOUS CONTINUOUS
Status: DISCONTINUED | OUTPATIENT
Start: 2023-09-19 | End: 2023-09-21

## 2023-09-19 RX ORDER — SODIUM CHLORIDE 0.9 % (FLUSH) 0.9 %
10 SYRINGE (ML) INJECTION AS NEEDED
Status: DISCONTINUED | OUTPATIENT
Start: 2023-09-19 | End: 2023-09-22 | Stop reason: HOSPADM

## 2023-09-19 RX ORDER — IBUPROFEN 600 MG/1
1 TABLET ORAL
Status: DISCONTINUED | OUTPATIENT
Start: 2023-09-19 | End: 2023-09-22 | Stop reason: HOSPADM

## 2023-09-19 RX ORDER — LISINOPRIL 10 MG/1
10 TABLET ORAL DAILY
Status: DISCONTINUED | OUTPATIENT
Start: 2023-09-20 | End: 2023-09-22 | Stop reason: HOSPADM

## 2023-09-19 RX ORDER — MORPHINE SULFATE 2 MG/ML
1 INJECTION, SOLUTION INTRAMUSCULAR; INTRAVENOUS EVERY 4 HOURS PRN
Status: DISCONTINUED | OUTPATIENT
Start: 2023-09-19 | End: 2023-09-22 | Stop reason: HOSPADM

## 2023-09-19 RX ORDER — PANTOPRAZOLE SODIUM 40 MG/10ML
40 INJECTION, POWDER, LYOPHILIZED, FOR SOLUTION INTRAVENOUS
Status: DISCONTINUED | OUTPATIENT
Start: 2023-09-20 | End: 2023-09-22 | Stop reason: HOSPADM

## 2023-09-19 RX ORDER — SODIUM CHLORIDE 9 MG/ML
40 INJECTION, SOLUTION INTRAVENOUS AS NEEDED
Status: DISCONTINUED | OUTPATIENT
Start: 2023-09-19 | End: 2023-09-22 | Stop reason: HOSPADM

## 2023-09-19 RX ORDER — ACETAMINOPHEN 325 MG/1
650 TABLET ORAL EVERY 4 HOURS PRN
Status: DISCONTINUED | OUTPATIENT
Start: 2023-09-19 | End: 2023-09-22 | Stop reason: HOSPADM

## 2023-09-19 RX ORDER — CHOLECALCIFEROL (VITAMIN D3) 125 MCG
5 CAPSULE ORAL NIGHTLY PRN
Status: DISCONTINUED | OUTPATIENT
Start: 2023-09-19 | End: 2023-09-22 | Stop reason: HOSPADM

## 2023-09-19 RX ORDER — METOCLOPRAMIDE HYDROCHLORIDE 5 MG/ML
10 INJECTION INTRAMUSCULAR; INTRAVENOUS EVERY 6 HOURS PRN
Status: DISCONTINUED | OUTPATIENT
Start: 2023-09-19 | End: 2023-09-22 | Stop reason: HOSPADM

## 2023-09-19 RX ORDER — POTASSIUM CHLORIDE 20 MEQ/1
40 TABLET, EXTENDED RELEASE ORAL EVERY 4 HOURS
Status: DISPENSED | OUTPATIENT
Start: 2023-09-20 | End: 2023-09-20

## 2023-09-19 RX ORDER — ONDANSETRON 2 MG/ML
4 INJECTION INTRAMUSCULAR; INTRAVENOUS EVERY 6 HOURS PRN
Status: DISCONTINUED | OUTPATIENT
Start: 2023-09-19 | End: 2023-09-22 | Stop reason: HOSPADM

## 2023-09-19 RX ORDER — NITROGLYCERIN 0.4 MG/1
0.4 TABLET SUBLINGUAL
Status: DISCONTINUED | OUTPATIENT
Start: 2023-09-19 | End: 2023-09-22 | Stop reason: HOSPADM

## 2023-09-19 RX ORDER — HYDROCODONE BITARTRATE AND ACETAMINOPHEN 5; 325 MG/1; MG/1
1 TABLET ORAL EVERY 4 HOURS PRN
Status: DISCONTINUED | OUTPATIENT
Start: 2023-09-19 | End: 2023-09-22 | Stop reason: HOSPADM

## 2023-09-19 RX ORDER — DEXTROSE MONOHYDRATE 25 G/50ML
25 INJECTION, SOLUTION INTRAVENOUS
Status: DISCONTINUED | OUTPATIENT
Start: 2023-09-19 | End: 2023-09-22 | Stop reason: HOSPADM

## 2023-09-19 RX ADMIN — INSULIN LISPRO 3 UNITS: 100 INJECTION, SOLUTION INTRAVENOUS; SUBCUTANEOUS at 22:46

## 2023-09-19 RX ADMIN — SODIUM CHLORIDE 1000 ML: 9 INJECTION, SOLUTION INTRAVENOUS at 16:00

## 2023-09-19 RX ADMIN — SODIUM CHLORIDE 1000 MG: 900 INJECTION INTRAVENOUS at 21:34

## 2023-09-19 RX ADMIN — SODIUM CHLORIDE 125 ML/HR: 9 INJECTION, SOLUTION INTRAVENOUS at 22:20

## 2023-09-19 RX ADMIN — ONDANSETRON 4 MG: 2 INJECTION INTRAMUSCULAR; INTRAVENOUS at 17:09

## 2023-09-19 RX ADMIN — SODIUM CHLORIDE 1000 ML: 9 INJECTION, SOLUTION INTRAVENOUS at 17:00

## 2023-09-19 RX ADMIN — INSULIN DETEMIR 10 UNITS: 100 INJECTION, SOLUTION SUBCUTANEOUS at 22:45

## 2023-09-19 RX ADMIN — PROCHLORPERAZINE EDISYLATE 10 MG: 5 INJECTION INTRAMUSCULAR; INTRAVENOUS at 19:29

## 2023-09-19 NOTE — ED PROVIDER NOTES
Subjective   History of Present Illness  Pleasant patient presents the ER for nausea vomiting.  History of diabetes and DKA.  Is felt ill for last several days.  Denies any cough fever chills denies any abdominal pain. Hx of cannabis use.      Review of Systems    Past Medical History:   Diagnosis Date    Diabetes mellitus     PT DENIES THIS DX, PREVIOUS DOCUMENTED    GI (gastrointestinal bleed)        No Known Allergies    No past surgical history on file.    Family History   Problem Relation Age of Onset    Diabetes Mother     No Known Problems Father     Colon cancer Neg Hx     Esophageal cancer Neg Hx     Inflammatory bowel disease Neg Hx     Irritable bowel syndrome Neg Hx     Ulcerative colitis Neg Hx        Social History     Socioeconomic History    Marital status: Single   Tobacco Use    Smoking status: Former    Smokeless tobacco: Never   Vaping Use    Vaping Use: Never used   Substance and Sexual Activity    Alcohol use: Yes     Comment: SOCIALLY    Drug use: No    Sexual activity: Yes     Partners: Female           Objective   Physical Exam  Constitutional:       Appearance: He is well-developed. He is ill-appearing.   HENT:      Head: Normocephalic and atraumatic.      Right Ear: External ear normal.      Left Ear: External ear normal.      Nose: Nose normal.   Eyes:      Conjunctiva/sclera: Conjunctivae normal.      Pupils: Pupils are equal, round, and reactive to light.   Cardiovascular:      Rate and Rhythm: Normal rate and regular rhythm.      Heart sounds: Normal heart sounds.   Pulmonary:      Effort: Pulmonary effort is normal. No respiratory distress.      Breath sounds: Normal breath sounds.   Abdominal:      General: Bowel sounds are normal.      Palpations: Abdomen is soft.      Tenderness: There is no abdominal tenderness.   Musculoskeletal:         General: Normal range of motion.      Cervical back: Normal range of motion and neck supple.      Comments: Discoloration noted to his right  great toe.  Good pulses noted.  Significant swelling in his medial aspect of his toe.  Not consistent with an abscess.   Skin:     General: Skin is warm and dry.   Neurological:      Mental Status: He is alert and oriented to person, place, and time.   Psychiatric:         Mood and Affect: Mood normal.         Behavior: Behavior normal.         Thought Content: Thought content normal.         Judgment: Judgment normal.       Critical Care  Performed by: Joaquim Silva APRN  Authorized by: Farncis Ortiz MD     Critical care provider statement:     Critical care time (minutes):  35    Critical care time was exclusive of:  Separately billable procedures and treating other patients    Critical care was necessary to treat or prevent imminent or life-threatening deterioration of the following conditions:  Metabolic crisis    Critical care was time spent personally by me on the following activities:  Ordering and performing treatments and interventions, ordering and review of laboratory studies, ordering and review of radiographic studies, pulse oximetry, re-evaluation of patient's condition, review of old charts, obtaining history from patient or surrogate, examination of patient, evaluation of patient's response to treatment, discussions with consultants and development of treatment plan with patient or surrogate           ED Course  ED Course as of 09/19/23 1955   Tue Sep 19, 2023   1514 Broad differential including DKA as well as severe dehydration and renal failure.  We will need to give fluids nausea medications lab work etc. []   1955 Patient with continued nausea vomiting.  I am awaiting the MRI for further evaluation of his right great toe wound.  I have admitted to the hospitalist. [JM]      ED Course User Index  [JM] Joaquim Silva APRN           CT Abdomen Pelvis Without Contrast   Final Result   Impression:   1.No acute process identified.                  Electronically Signed: Dejan Delgado MD      9/19/2023 4:26 PM CDT     Workstation ID: [id number]      XR Foot 3+ View Right   Final Result   Impression:   No acute osseous process identified. No radiographic evidence of osteomyelitis.         Electronically Signed: Dejan Delgado MD     9/19/2023 3:18 PM CDT     Workstation ID: [id number]      XR Chest 1 View   Final Result   Impression:   1.An acute pulmonary process is not apparent.         Electronically Signed: Gm Quiroz MD     9/19/2023 4:15 PM EDT     Workstation ID: [id number]      MRI Foot Right Without Contrast    (Results Pending)                                     Medical Decision Making  Problems Addressed:  Cannabis hyperemesis syndrome concurrent with and due to cannabis dependence: complicated acute illness or injury  Hyperglycemia: complicated acute illness or injury  Intractable nausea and vomiting: complicated acute illness or injury  Lactic acidosis: complicated acute illness or injury  Toe infection: complicated acute illness or injury    Amount and/or Complexity of Data Reviewed  External Data Reviewed: labs, radiology and ECG.  Labs: ordered.  Radiology: ordered.  ECG/medicine tests: ordered.    Risk  Prescription drug management.    Critical Care  Total time providing critical care: 35 minutes      Final diagnoses:   Intractable nausea and vomiting   Lactic acidosis   Toe infection   Cannabis hyperemesis syndrome concurrent with and due to cannabis dependence   Hyperglycemia       ED Disposition  ED Disposition       ED Disposition   Decision to Admit    Condition   --    Comment   Level of Care: Telemetry [5]   Diagnosis: Intractable nausea and vomiting [[id number]]   Admitting Physician: PATSY SINCLAIR III [[id number]]   Certification: I Certify That Inpatient Hospital Services Are Medically Necessary For Greater Than 2 Midnights                 No follow-up provider specified.       Medication List      No changes were made to your prescriptions during this visit.            Ricardo  Joaquim, APRN  09/19/23 1956

## 2023-09-20 LAB
ALBUMIN SERPL-MCNC: 4.6 G/DL (ref 3.5–5.2)
ALBUMIN/GLOB SERPL: 1.8 G/DL
ALP SERPL-CCNC: 63 U/L (ref 39–117)
ALT SERPL W P-5'-P-CCNC: 11 U/L (ref 1–41)
ANION GAP SERPL CALCULATED.3IONS-SCNC: 13 MMOL/L (ref 5–15)
AST SERPL-CCNC: 9 U/L (ref 1–40)
BASOPHILS # BLD AUTO: 0.01 10*3/MM3 (ref 0–0.2)
BASOPHILS NFR BLD AUTO: 0.1 % (ref 0–1.5)
BILIRUB SERPL-MCNC: 1.1 MG/DL (ref 0–1.2)
BUN SERPL-MCNC: 15 MG/DL (ref 6–20)
BUN/CREAT SERPL: 26.3 (ref 7–25)
CALCIUM SPEC-SCNC: 9.4 MG/DL (ref 8.6–10.5)
CHLORIDE SERPL-SCNC: 99 MMOL/L (ref 98–107)
CO2 SERPL-SCNC: 26 MMOL/L (ref 22–29)
CREAT SERPL-MCNC: 0.57 MG/DL (ref 0.76–1.27)
DEPRECATED RDW RBC AUTO: 39.6 FL (ref 37–54)
EGFRCR SERPLBLD CKD-EPI 2021: 137.8 ML/MIN/1.73
EOSINOPHIL # BLD AUTO: 0 10*3/MM3 (ref 0–0.4)
EOSINOPHIL NFR BLD AUTO: 0 % (ref 0.3–6.2)
ERYTHROCYTE [DISTWIDTH] IN BLOOD BY AUTOMATED COUNT: 12.6 % (ref 12.3–15.4)
GLOBULIN UR ELPH-MCNC: 2.6 GM/DL
GLUCOSE BLDC GLUCOMTR-MCNC: 146 MG/DL (ref 70–130)
GLUCOSE BLDC GLUCOMTR-MCNC: 199 MG/DL (ref 70–130)
GLUCOSE BLDC GLUCOMTR-MCNC: 217 MG/DL (ref 70–130)
GLUCOSE BLDC GLUCOMTR-MCNC: 374 MG/DL (ref 70–130)
GLUCOSE SERPL-MCNC: 230 MG/DL (ref 65–99)
HCT VFR BLD AUTO: 40.2 % (ref 37.5–51)
HGB BLD-MCNC: 14 G/DL (ref 13–17.7)
IMM GRANULOCYTES # BLD AUTO: 0.03 10*3/MM3 (ref 0–0.05)
IMM GRANULOCYTES NFR BLD AUTO: 0.3 % (ref 0–0.5)
LYMPHOCYTES # BLD AUTO: 1.01 10*3/MM3 (ref 0.7–3.1)
LYMPHOCYTES NFR BLD AUTO: 11.1 % (ref 19.6–45.3)
MAGNESIUM SERPL-MCNC: 1.9 MG/DL (ref 1.6–2.6)
MCH RBC QN AUTO: 30.2 PG (ref 26.6–33)
MCHC RBC AUTO-ENTMCNC: 34.8 G/DL (ref 31.5–35.7)
MCV RBC AUTO: 86.6 FL (ref 79–97)
MONOCYTES # BLD AUTO: 0.8 10*3/MM3 (ref 0.1–0.9)
MONOCYTES NFR BLD AUTO: 8.8 % (ref 5–12)
NEUTROPHILS NFR BLD AUTO: 7.29 10*3/MM3 (ref 1.7–7)
NEUTROPHILS NFR BLD AUTO: 79.7 % (ref 42.7–76)
NRBC BLD AUTO-RTO: 0 /100 WBC (ref 0–0.2)
PLATELET # BLD AUTO: 256 10*3/MM3 (ref 140–450)
PMV BLD AUTO: 10.1 FL (ref 6–12)
POTASSIUM SERPL-SCNC: 3.5 MMOL/L (ref 3.5–5.2)
PROT SERPL-MCNC: 7.2 G/DL (ref 6–8.5)
RBC # BLD AUTO: 4.64 10*6/MM3 (ref 4.14–5.8)
SODIUM SERPL-SCNC: 138 MMOL/L (ref 136–145)
WBC NRBC COR # BLD: 9.14 10*3/MM3 (ref 3.4–10.8)

## 2023-09-20 PROCEDURE — 85025 COMPLETE CBC W/AUTO DIFF WBC: CPT | Performed by: INTERNAL MEDICINE

## 2023-09-20 PROCEDURE — 25010000002 VANCOMYCIN PER 500 MG

## 2023-09-20 PROCEDURE — 96365 THER/PROPH/DIAG IV INF INIT: CPT

## 2023-09-20 PROCEDURE — 84681 ASSAY OF C-PEPTIDE: CPT | Performed by: FAMILY MEDICINE

## 2023-09-20 PROCEDURE — 80053 COMPREHEN METABOLIC PANEL: CPT | Performed by: INTERNAL MEDICINE

## 2023-09-20 PROCEDURE — 25010000002 METOCLOPRAMIDE PER 10 MG: Performed by: INTERNAL MEDICINE

## 2023-09-20 PROCEDURE — 83735 ASSAY OF MAGNESIUM: CPT | Performed by: INTERNAL MEDICINE

## 2023-09-20 PROCEDURE — 63710000001 INSULIN DETEMIR PER 5 UNITS: Performed by: INTERNAL MEDICINE

## 2023-09-20 PROCEDURE — 96375 TX/PRO/DX INJ NEW DRUG ADDON: CPT

## 2023-09-20 PROCEDURE — 82948 REAGENT STRIP/BLOOD GLUCOSE: CPT

## 2023-09-20 PROCEDURE — 25010000002 ONDANSETRON PER 1 MG: Performed by: INTERNAL MEDICINE

## 2023-09-20 PROCEDURE — 25010000002 HYDRALAZINE PER 20 MG: Performed by: FAMILY MEDICINE

## 2023-09-20 PROCEDURE — 63710000001 INSULIN LISPRO (HUMAN) PER 5 UNITS: Performed by: INTERNAL MEDICINE

## 2023-09-20 PROCEDURE — 0 POTASSIUM CHLORIDE 10 MEQ/100ML SOLUTION: Performed by: FAMILY MEDICINE

## 2023-09-20 PROCEDURE — 93005 ELECTROCARDIOGRAM TRACING: CPT | Performed by: INTERNAL MEDICINE

## 2023-09-20 PROCEDURE — 93010 ELECTROCARDIOGRAM REPORT: CPT | Performed by: INTERNAL MEDICINE

## 2023-09-20 PROCEDURE — 25010000002 PROCHLORPERAZINE 10 MG/2ML SOLUTION: Performed by: FAMILY MEDICINE

## 2023-09-20 PROCEDURE — 96366 THER/PROPH/DIAG IV INF ADDON: CPT

## 2023-09-20 PROCEDURE — 96376 TX/PRO/DX INJ SAME DRUG ADON: CPT

## 2023-09-20 PROCEDURE — 84132 ASSAY OF SERUM POTASSIUM: CPT | Performed by: FAMILY MEDICINE

## 2023-09-20 PROCEDURE — G0378 HOSPITAL OBSERVATION PER HR: HCPCS

## 2023-09-20 RX ORDER — POTASSIUM CHLORIDE 20 MEQ/1
40 TABLET, EXTENDED RELEASE ORAL EVERY 4 HOURS
Status: DISCONTINUED | OUTPATIENT
Start: 2023-09-20 | End: 2023-09-20

## 2023-09-20 RX ORDER — METOPROLOL TARTRATE 5 MG/5ML
5 INJECTION INTRAVENOUS EVERY 6 HOURS PRN
Status: DISCONTINUED | OUTPATIENT
Start: 2023-09-20 | End: 2023-09-22 | Stop reason: HOSPADM

## 2023-09-20 RX ORDER — AMLODIPINE BESYLATE 5 MG/1
5 TABLET ORAL
Status: DISCONTINUED | OUTPATIENT
Start: 2023-09-20 | End: 2023-09-21

## 2023-09-20 RX ORDER — LABETALOL HYDROCHLORIDE 5 MG/ML
10 INJECTION, SOLUTION INTRAVENOUS ONCE
Status: COMPLETED | OUTPATIENT
Start: 2023-09-20 | End: 2023-09-20

## 2023-09-20 RX ORDER — HYDRALAZINE HYDROCHLORIDE 20 MG/ML
10 INJECTION INTRAMUSCULAR; INTRAVENOUS EVERY 6 HOURS PRN
Status: DISCONTINUED | OUTPATIENT
Start: 2023-09-20 | End: 2023-09-22 | Stop reason: HOSPADM

## 2023-09-20 RX ORDER — PROCHLORPERAZINE 25 MG
25 SUPPOSITORY, RECTAL RECTAL EVERY 12 HOURS PRN
Status: DISCONTINUED | OUTPATIENT
Start: 2023-09-20 | End: 2023-09-22 | Stop reason: HOSPADM

## 2023-09-20 RX ORDER — POTASSIUM CHLORIDE 7.45 MG/ML
10 INJECTION INTRAVENOUS
Status: COMPLETED | OUTPATIENT
Start: 2023-09-20 | End: 2023-09-20

## 2023-09-20 RX ORDER — CLINDAMYCIN HYDROCHLORIDE 150 MG/1
300 CAPSULE ORAL EVERY 6 HOURS SCHEDULED
Status: DISCONTINUED | OUTPATIENT
Start: 2023-09-20 | End: 2023-09-22 | Stop reason: HOSPADM

## 2023-09-20 RX ORDER — PROCHLORPERAZINE EDISYLATE 5 MG/ML
5 INJECTION INTRAMUSCULAR; INTRAVENOUS EVERY 6 HOURS PRN
Status: DISCONTINUED | OUTPATIENT
Start: 2023-09-20 | End: 2023-09-22 | Stop reason: HOSPADM

## 2023-09-20 RX ORDER — PROCHLORPERAZINE MALEATE 5 MG/1
5 TABLET ORAL EVERY 6 HOURS PRN
Status: DISCONTINUED | OUTPATIENT
Start: 2023-09-20 | End: 2023-09-22 | Stop reason: HOSPADM

## 2023-09-20 RX ADMIN — INSULIN DETEMIR 10 UNITS: 100 INJECTION, SOLUTION SUBCUTANEOUS at 20:34

## 2023-09-20 RX ADMIN — ALPRAZOLAM 0.25 MG: 0.25 TABLET ORAL at 03:39

## 2023-09-20 RX ADMIN — VANCOMYCIN HYDROCHLORIDE 750 MG: 750 INJECTION, SOLUTION INTRAVENOUS at 08:27

## 2023-09-20 RX ADMIN — POTASSIUM CHLORIDE 40 MEQ: 1500 TABLET, EXTENDED RELEASE ORAL at 00:30

## 2023-09-20 RX ADMIN — POTASSIUM CHLORIDE 10 MEQ: 7.46 INJECTION, SOLUTION INTRAVENOUS at 18:35

## 2023-09-20 RX ADMIN — ONDANSETRON 4 MG: 2 INJECTION INTRAMUSCULAR; INTRAVENOUS at 19:02

## 2023-09-20 RX ADMIN — POTASSIUM CHLORIDE 10 MEQ: 7.46 INJECTION, SOLUTION INTRAVENOUS at 16:11

## 2023-09-20 RX ADMIN — ONDANSETRON 4 MG: 2 INJECTION INTRAMUSCULAR; INTRAVENOUS at 09:27

## 2023-09-20 RX ADMIN — INSULIN DETEMIR 10 UNITS: 100 INJECTION, SOLUTION SUBCUTANEOUS at 08:24

## 2023-09-20 RX ADMIN — INSULIN LISPRO 3 UNITS: 100 INJECTION, SOLUTION INTRAVENOUS; SUBCUTANEOUS at 20:34

## 2023-09-20 RX ADMIN — POTASSIUM CHLORIDE 10 MEQ: 7.46 INJECTION, SOLUTION INTRAVENOUS at 17:28

## 2023-09-20 RX ADMIN — Medication 10 ML: at 20:43

## 2023-09-20 RX ADMIN — LISINOPRIL 10 MG: 10 TABLET ORAL at 08:23

## 2023-09-20 RX ADMIN — INSULIN LISPRO 2 UNITS: 100 INJECTION, SOLUTION INTRAVENOUS; SUBCUTANEOUS at 17:28

## 2023-09-20 RX ADMIN — METOCLOPRAMIDE 10 MG: 5 INJECTION, SOLUTION INTRAMUSCULAR; INTRAVENOUS at 06:07

## 2023-09-20 RX ADMIN — POTASSIUM CHLORIDE 10 MEQ: 7.46 INJECTION, SOLUTION INTRAVENOUS at 15:16

## 2023-09-20 RX ADMIN — Medication 10 MG: at 04:09

## 2023-09-20 RX ADMIN — METOPROLOL TARTRATE 5 MG: 5 INJECTION INTRAVENOUS at 16:12

## 2023-09-20 RX ADMIN — VANCOMYCIN HYDROCHLORIDE 750 MG: 750 INJECTION, SOLUTION INTRAVENOUS at 15:16

## 2023-09-20 RX ADMIN — HYDRALAZINE HYDROCHLORIDE 10 MG: 20 INJECTION INTRAMUSCULAR; INTRAVENOUS at 14:26

## 2023-09-20 RX ADMIN — PROCHLORPERAZINE EDISYLATE 5 MG: 5 INJECTION INTRAMUSCULAR; INTRAVENOUS at 12:25

## 2023-09-20 RX ADMIN — PANTOPRAZOLE SODIUM 40 MG: 40 INJECTION, POWDER, LYOPHILIZED, FOR SOLUTION INTRAVENOUS at 04:08

## 2023-09-20 RX ADMIN — INSULIN LISPRO 6 UNITS: 100 INJECTION, SOLUTION INTRAVENOUS; SUBCUTANEOUS at 08:23

## 2023-09-20 RX ADMIN — ONDANSETRON 4 MG: 2 INJECTION INTRAMUSCULAR; INTRAVENOUS at 03:43

## 2023-09-20 RX ADMIN — VANCOMYCIN HYDROCHLORIDE 750 MG: 750 INJECTION, SOLUTION INTRAVENOUS at 02:35

## 2023-09-20 NOTE — PROGRESS NOTES
"Pharmacy Consult-Vancomycin Dosing  [facility] Sae Mukherjee Jr. is a  28 y.o. male receiving vancomycin therapy.     Indication: SSTI  Consulting Provider: Willi OH Consult: No    Goal AUC: 400 - 600 mg/L*hr    Current Antimicrobial Therapy  Anti-Infectives (From admission, onward)      Ordered     Dose/Rate Route Frequency Start Stop    09/19/23 2043  cefTRIAXone (ROCEPHIN) 1000 mg/100 mL 0.9% NS (MBP)        Ordering Provider: Willi Kay III, DO    1,000 mg  over 30 Minutes Intravenous Every 24 Hours 09/19/23 2100 09/24/23 2059 09/19/23 2043  Pharmacy to dose vancomycin        Ordering Provider: Willi Kay III, DO     Does not apply Continuous PRN 09/19/23 2043 09/24/23 2042            Allergies  Allergies as of 09/19/2023    (No Known Allergies)       Labs    Results from last 7 days   Lab Units 09/19/23  1552   BUN mg/dL 29*   CREATININE mg/dL 0.83       Results from last 7 days   Lab Units 09/19/23  1552   WBC 10*3/mm3 8.23       Evaluation of Dosing     Last Dose Received in the ED/Outside Facility:   Is Patient on Dialysis or Renal Replacement:     Ht - 180.3 cm (71\")  Wt - 68 kg (150 lb)    Estimated Creatinine Clearance: 128.6 mL/min (by C-G formula based on SCr of 0.83 mg/dL).    Intake & Output (last 3 days)         09/17 0701 09/18 0700 09/18 0701  09/19 0700 09/19 0701 09/20 0700    IV Piggyback   2000    Total Intake(mL/kg)   2000 (29.4)    Net   +2000                   Microbiology and Radiology  Microbiology Results (last 10 days)       Procedure Component Value - Date/Time    COVID PRE-OP / PRE-PROCEDURE SCREENING ORDER (NO ISOLATION) - Swab, Nasopharynx [[id number]]  (Normal) Collected: 09/19/23 1559    Lab Status: Final result Specimen: Swab from Nasopharynx Updated: 09/19/23 1708    Narrative:      The following orders were created for panel order COVID PRE-OP / PRE-PROCEDURE SCREENING ORDER (NO ISOLATION) - Swab, Nasopharynx.  Procedure                         "       Abnormality         Status                     ---------                               -----------         ------                     Respiratory Panel PCR w/...[[id number]]  Normal              Final result                 Please view results for these tests on the individual orders.    Respiratory Panel PCR w/COVID-19(SARS-CoV-2) LIANNE/CASEY/EVERETTE/PAD/COR/MAD/MELANIE In-House, NP Swab in UTM/VTM, 3-4 HR TAT - Swab, Nasopharynx [[id number]]  (Normal) Collected: 09/19/23 5084    Lab Status: Final result Specimen: Swab from Nasopharynx Updated: 09/19/23 1700     ADENOVIRUS, PCR Not Detected     Coronavirus 229E Not Detected     Coronavirus HKU1 Not Detected     Coronavirus NL63 Not Detected     Coronavirus OC43 Not Detected     COVID19 Not Detected     Human Metapneumovirus Not Detected     Human Rhinovirus/Enterovirus Not Detected     Influenza A PCR Not Detected     Influenza B PCR Not Detected     Parainfluenza Virus 1 Not Detected     Parainfluenza Virus 2 Not Detected     Parainfluenza Virus 3 Not Detected     Parainfluenza Virus 4 Not Detected     RSV, PCR Not Detected     Bordetella pertussis pcr Not Detected     Bordetella parapertussis PCR Not Detected     Chlamydophila pneumoniae PCR Not Detected     Mycoplasma pneumo by PCR Not Detected    Narrative:      In the setting of a positive respiratory panel with a viral infection PLUS a negative procalcitonin without other underlying concern for bacterial infection, consider observing off antibiotics or discontinuation of antibiotics and continue supportive care. If the respiratory panel is positive for atypical bacterial infection (Bordetella pertussis, Chlamydophila pneumoniae, or Mycoplasma pneumoniae), consider antibiotic de-escalation to target atypical bacterial infection.            Reported Vancomycin Levels                         [employer] AUC Calculation:    Current AUC:    mg/L*hr    Predicted Steady State AUC on Current Dose:   mg/L*hr  _________________________________    Predicted Steady State AUC on New Dose:    mg/L*hr    Assessment/Plan:  1. Pharmacy to dose vancomycin for indication of SSTI.  2. Creatinine 0.83, WBC 8.2, afebrile  3. Will start patient on a maintenance dose of Vancomycin 750 mg IV Q8H.  4. A trough will be drawn prior to 5th dose on current regimen on 9/21@0600 with AM labs.  5. Pharmacy will continue to follow and adjust dose according to updates in cultures, renal function and clinical status.     Thanks  Bhartahi Stephens, PharmD, BCPS  9/19/2023  20:53 EDT

## 2023-09-20 NOTE — PAYOR COMM NOTE
"Avis Phelps, MIYA  Utilization Management  P:[phone]  F:[phone]    Ref # [id number]  Mansoor Bailey Jr. (28 y.o. Male)       Date of Birth   1995    Social Security Number       Address   [address] [address]    Home Phone   [phone]    MRN   [id number]       Sabianism   Mandaen    Marital Status   Single                            Admission Date   23    Admission Type   Emergency    Admitting Provider   LEATHA Prescott DO    Attending Provider   LEATHA Prescott DO    Department, Room/Bed   [facility] 5G, S558/1       Discharge Date       Discharge Disposition       Discharge Destination                                 Attending Provider: LEATHA Prescott DO    Allergies: No Known Allergies    Isolation: None   Infection: None   Code Status: CPR    Ht: 180.3 cm (71\")   Wt: 68 kg (150 lb)    Admission Cmt: None   Principal Problem: Intractable nausea and vomiting [R11.2]                   Active Insurance as of 2023       Primary Coverage       Payor Plan Insurance Group Employer/Plan Group    [facility] COMMUNITY PLAN [facility] PLAN Levine, Susan. \Hospital Has a New Name and Outlook.\""       Payor Plan Address Payor Plan Phone Number Payor Plan Fax Number Effective Dates    [address]   2022 - None Entered    [facility] [id number]         Subscriber Name Subscriber Birth Date Member ID       MANSOOR BAILEY JR. 1995 [id number]                   History & Physical        Willi Kay III, DO at 23 2049              [facility]  HISTORY AND PHYSICAL    Patient Name: Mansoor Bailey Jr.  : 1995  MRN: [id number]  Primary Care Physician: Shalini Goodson, APRN  Date of admission: 2023      Subjective   Subjective     Chief Complaint:  Nausea/emesis    HPI:  Mansoor Bailey Jr. is a 28 y.o. male who states that he has had increased nausea and emesis since  (2 days ago).  No gricel " "blood in the emesis.  He states his nausea is made worse by any oral intake of food or fluids, but he only feels slightly better after a bout of emesis.  He adds that he \"cannot keep anything down\" over the last 2 days.  Denies abdominal pain, fever, or chills.  Work-up in the ED tonight (Tuesday) included labs which revealed glucose in the mid 300s.  The patient states he does indeed occasionally miss doses of his diabetic medications.  He states he was told he is a type II diabetic, and even though he is 28 years old currently he states he cannot recall the age at which he was diagnosed with diabetes.  He also confirms increased generalized fatigue.  He states he took his diabetic medications last night, but not today, due to his increased oral intake.  He also notes that he has had a right-sided first toe lesion on the medial side of the nail which has looked worse over the last 2 weeks.  Minimal drainage, no pain.  He also denies chest pain, shortness of breath, recent abdominal trauma, bowel habit change, slurred speech/facial droop, focal weakness, or syncope.  He states that other than his type 2 diabetes, he has no other medical problems.  His UDS revealed positive result for THC, but he states he has not used marijuana in a month.  He also states that he occasionally drinks alcohol, \"sometimes but not every day,\" and he last drank on Sunday.      Personal History     Past Medical History:   Diagnosis Date    Diabetes mellitus     PT DENIES THIS DX, PREVIOUS DOCUMENTED    GI (gastrointestinal bleed)              No past surgical history on file.    Family History: Mother is also diabetic.  Father has glaucoma.    Social History: He states he is a former smoker but has not used any tobacco products of any kind for \"a long time.\"  She confirms occasional alcohol use, most recent drink on this admission was 2 days ago before onset of his symptoms.  He states his last THC use was 1 month ago.  Social History "     Social History Narrative    Not on file       Medications:  Available home medication information reviewed.  (Not in a hospital admission)      No Known Allergies    Objective   Objective     Vital Signs:   Temp:  [99.1 °F (37.3 °C)] 99.1 °F (37.3 °C)  Heart Rate:  [] 108  Resp:  [16] 16  BP: (104-170)/() 125/96       Physical Exam   Constitutional: Awake, alert, NAD.  Eyes: PERRLA, sclerae anicteric, dry eyes but no conjunctival injection  HENT: NCAT, mucous membranes.  Neck: Supple, no thyromegaly, no lymphadenopathy, trachea midline  Respiratory: Clear to auscultation bilaterally, nonlabored respirations   Cardiovascular: Tachycardic but regular rhythm at 150 bpm, no murmurs, rubs, or gallops, palpable pedal pulses bilaterally  Gastrointestinal: Positive but markedly decreased frequency of bowel sounds, soft, nontender, nondistended  Musculoskeletal: No bilateral ankle edema, no clubbing or cyanosis to extremities  Psychiatric: Flat affect, minimal eye contact, cooperative  Neurologic: Oriented x 3, strength symmetric in all extremities, Cranial Nerves grossly intact to confrontation, speech clear  Skin: No rashes.  Right great toe has a lesion along medial aspect of the nail, erythematous, crusted material over the area, no active drainage, some erythema to the distal end of the toe and the medial and plantar aspects.  No sensation loss, minimal warmth on palpation.  No pain on palpation.    Result Review:  I have personally reviewed the results from the time of this admission to 9/19/2023 20:49 EDT and agree with these findings:  [x]  Laboratory list / accordion  []  Microbiology  [x]  Radiology  [x]  EKG/Telemetry   []  Cardiology/Vascular   []  Pathology  []  Old records  []  Other:  Most notable findings include: Reviewed EKG which by my read shows sinus tachycardia with ventricular rate approximately 150 bpm, normal axis, nonspecific ST/T wave changes but no acute appearing ST elevation.  I  reviewed chest x-ray which by my read shows no acute infiltrate/edema on this single AP view.  Reviewed reports from right foot skeletal plain film as well as MRI right foot.        LAB RESULTS:      Lab 09/19/23  1903 09/19/23  1552   WBC  --  8.23   HEMOGLOBIN  --  15.5   HEMATOCRIT  --  44.9   PLATELETS  --  292   NEUTROS ABS  --  7.45*   IMMATURE GRANS (ABS)  --  0.03   LYMPHS ABS  --  0.32*   MONOS ABS  --  0.41   EOS ABS  --  0.01   MCV  --  85.9   LACTATE 3.3* 2.9*         Lab 09/19/23  1552   SODIUM 142   POTASSIUM 3.6   CHLORIDE 93*   CO2 32.0*   ANION GAP 17.0*   BUN 29*   CREATININE 0.83   EGFR 123.0   GLUCOSE 361*   CALCIUM 10.7*   HEMOGLOBIN A1C 7.90*         Lab 09/19/23  1552   TOTAL PROTEIN 8.3   ALBUMIN 5.5*   GLOBULIN 2.8   ALT (SGPT) 15   AST (SGOT) 11   BILIRUBIN 1.0   ALK PHOS 82                     Lab 09/19/23  1549   FIO2 21   CARBOXYHEMOGLOBIN (VENOUS) 1.3     UA          5/7/2023    10:19 8/2/2023    13:00 9/19/2023    17:51   Urinalysis   Squamous Epithelial Cells, UA 0-2  0-2  0-2    Specific Gravity, UA 1.042  1.038  1.045    Ketones, UA 80 mg/dL (3+)  80 mg/dL (3+)  80 mg/dL (3+)    Blood, UA Negative  Negative  Negative    Leukocytes, UA Negative  Negative  Negative    Nitrite, UA Negative  Negative  Negative    RBC, UA 0-2  0-2  0-2    WBC, UA 0-2  3-5  0-2    Bacteria, UA None Seen  None Seen  None Seen        Microbiology Results (last 10 days)       Procedure Component Value - Date/Time    COVID PRE-OP / PRE-PROCEDURE SCREENING ORDER (NO ISOLATION) - Swab, Nasopharynx [[id number]]  (Normal) Collected: 09/19/23 1559    Lab Status: Final result Specimen: Swab from Nasopharynx Updated: 09/19/23 1708    Narrative:      The following orders were created for panel order COVID PRE-OP / PRE-PROCEDURE SCREENING ORDER (NO ISOLATION) - Swab, Nasopharynx.  Procedure                               Abnormality         Status                     ---------                               -----------          ------                     Respiratory Panel PCR w/...[[id number]]  Normal              Final result                 Please view results for these tests on the individual orders.    Respiratory Panel PCR w/COVID-19(SARS-CoV-2) LIANNE/CASEY/EVERETTE/PAD/COR/MAD/MELANIE In-House, NP Swab in UTM/VTM, 3-4 HR TAT - Swab, Nasopharynx [[id number]]  (Normal) Collected: 09/19/23 1559    Lab Status: Final result Specimen: Swab from Nasopharynx Updated: 09/19/23 2451     ADENOVIRUS, PCR Not Detected     Coronavirus 229E Not Detected     Coronavirus HKU1 Not Detected     Coronavirus NL63 Not Detected     Coronavirus OC43 Not Detected     COVID19 Not Detected     Human Metapneumovirus Not Detected     Human Rhinovirus/Enterovirus Not Detected     Influenza A PCR Not Detected     Influenza B PCR Not Detected     Parainfluenza Virus 1 Not Detected     Parainfluenza Virus 2 Not Detected     Parainfluenza Virus 3 Not Detected     Parainfluenza Virus 4 Not Detected     RSV, PCR Not Detected     Bordetella pertussis pcr Not Detected     Bordetella parapertussis PCR Not Detected     Chlamydophila pneumoniae PCR Not Detected     Mycoplasma pneumo by PCR Not Detected    Narrative:      In the setting of a positive respiratory panel with a viral infection PLUS a negative procalcitonin without other underlying concern for bacterial infection, consider observing off antibiotics or discontinuation of antibiotics and continue supportive care. If the respiratory panel is positive for atypical bacterial infection (Bordetella pertussis, Chlamydophila pneumoniae, or Mycoplasma pneumoniae), consider antibiotic de-escalation to target atypical bacterial infection.            CT Abdomen Pelvis Without Contrast    Result Date: 9/19/2023  CT ABDOMEN PELVIS WO CONTRAST Date of Exam: 9/19/2023 4:17 PM CDT Indication: nv abd pain. Comparison: 3/9/2023 Technique: Axial CT images were obtained of the abdomen and pelvis without the administration of contrast.  Reconstructed coronal and sagittal images were also obtained. Automated exposure control and iterative construction methods were used. Findings: LUNG BASES:  Unremarkable without mass or infiltrate. LIVER:  Unremarkable parenchyma without focal lesion. BILIARY/GALLBLADDER: There is sludge within the gallbladder. There are no gallbladder inflammatory changes. SPLEEN:  Unremarkable PANCREAS:  Unremarkable ADRENAL:  Unremarkable KIDNEYS:  Unremarkable parenchyma with no solid mass identified. No obstruction.  No calculus identified. GASTROINTESTINAL/MESENTERY:  No evidence of obstruction nor inflammation.  The appendix is normal. AORTA/IVC:  Normal caliber. RETROPERITONEUM/LYMPH NODES:  Unremarkable REPRODUCTIVE:  Unremarkable BLADDER:  Unremarkable OSSEUS STRUCTURES:  Typical for age with no acute process identified.     Impression: Impression: 1.No acute process identified. Electronically Signed: Dejan Delgado MD  9/19/2023 4:26 PM CDT  Workstation ID: [id number]    XR Foot 3+ View Right    Result Date: 9/19/2023  XR FOOT 3+ VW RIGHT Date of Exam: 9/19/2023 2:26 PM CDT Indication: right great toe infections. osteo? Comparison: None available. Findings: No evidence of acute fracture nor dislocation. Alignment is normal. Joint space is adequately maintained. No significant effusion identified. Soft tissues are unremarkable. No radiopaque foreign body identified.     Impression: Impression: No acute osseous process identified. No radiographic evidence of osteomyelitis. Electronically Signed: Dejan Delgado MD  9/19/2023 3:18 PM CDT  Workstation ID: [id number]    XR Chest 1 View    Result Date: 9/19/2023  XR CHEST 1 VW Date of Exam: 9/19/2023 3:26 PM EDT Indication: dka. cough. Comparison: March 9, 2023 Findings: Heart not definitely enlarged. Lungs seem clear. There are no pleural effusions. Visualized osseous structures do not appear unusual.     Impression: Impression: 1.An acute pulmonary process is not apparent.  Electronically Signed: Gm Quiroz MD  9/19/2023 4:15 PM EDT  Workstation ID: [id number]    MRI Foot Right Without Contrast    Result Date: 9/19/2023  MRI FOOT RIGHT WO CONTRAST Date of Exam: 9/19/2023 5:50 PM EDT Indication: right great toe swelling. eval for osteo..  Comparison: None available. Technique:  Routine multiplanar/multisequence sequence images of the right foot were obtained without contrast administration. Findings: There is edema within the soft tissues overlying the distal phalanx of the first digit particularly of the lung the nail bed and most distal soft tissues overlying the tuft. There is subtle edema within the tuft of the distal phalanx of the first digit. There is no cortical destruction. No intraosseous fluid collection. No soft tissue fluid collection. Visualized portions of the extent and flexor tendons appear intact.    Impression: Impression: 1. Soft tissue edema overlying the distal phalanx of the first digit with subtle underlying bone edema which is nonspecific and may be reactive. No cortical destruction identified to suggest osteomyelitis at this time. No evidence of soft tissue or intraosseous abscess. Electronically Signed: Fabrizio Dietrich MD  9/19/2023 8:08 PM EDT  Workstation ID: [id number]         Assessment & Plan   Assessment & Plan     Active Hospital Problems    Diagnosis  POA    **Intractable nausea and vomiting [R11.2]  Yes       28M with uncontrolled type 2 diabetes, intractable nausea/emesis, also right great toe small lesion    Intractable nausea/emesis  - IV Zofran as needed; can use IV Reglan if this is effective.  - AM EKG to measure QT normal.  - Diabetic diet currently ordered, can scale back to clear liquids or n.p.o. if the patient cannot tolerate.  - IVF with 0.9 NS.  - We will also add IV Protonix daily.    Uncontrolled type 2 diabetes  - Levemir 10 units subcu twice daily.  - Add SSI coverage with Accu-Cheks AC/at bedtime.  - IVF with 0.9 NS.  - Still awaiting  beta-hydroxybutyrate result.  - Diabetes educator.    Right great toe lesion  - Wound care nurse consult.  - We will start IV antibiotic coverage with vancomycin and ceftriaxone.  - Keep clean and dry.    Occasional alcohol use  Positive THC on UDS  - His tachycardia currently is likely secondary to dehydration.  - Patient states he has not used any marijuana in a month despite the positive result; nausea/emesis may also be related to THC use.  - No other signs of withdrawal at this time; can add CIWA protocol if needed.  - Low-dose as needed Xanax for anxiety.  - We will check EtOH level of blood in lab.      Total time spent: 79 minutes  Time spent includes time reviewing chart, face-to-face time, counseling patient/family/caregiver, ordering medications/tests/procedures, communicating with other health care professionals, documenting clinical information in the electronic health record, and coordination of care.     DVT prophylaxis:  scds      CODE STATUS:  full  Code Status and Medical Interventions:   Ordered at: 09/19/23 2024     Level Of Support Discussed With:    Patient     Code Status (Patient has no pulse and is not breathing):    CPR (Attempt to Resuscitate)     Medical Interventions (Patient has pulse or is breathing):    Full Support       Expected Discharge 9/20    Willi Kay,III, DO  09/19/23     Electronically signed by Willi Kay III, DO at 09/19/23 2107       Vital Signs (last day)       Date/Time Temp Temp src Pulse Resp BP Patient Position SpO2    09/20/23 0822 98.7 (37.1) Oral 81 18 179/115 Sitting 95    09/20/23 0332 98.9 (37.2) Oral 104 16 169/105 Lying 93    09/19/23 2212 99.8 (37.7) Oral 114 16 139/95 Lying 98    09/19/23 2134 -- -- 133 -- -- -- 95    09/19/23 2030 -- -- 142 -- 154/118 -- 96    09/19/23 1933 -- -- 108 -- -- -- 96    09/19/23 1930 -- -- 101 -- 125/96 -- 97    09/19/23 1900 -- -- 91 -- 168/112 -- 97    09/19/23 1728 -- -- 117 -- 157/120 -- 97     09/19/23 1704 -- -- 130 -- 170/119 -- 97    09/19/23 1659 -- -- 130 -- 104/21 -- 95    09/19/23 1644 -- -- 126 -- 153/112 -- 97    09/19/23 1630 -- -- 117 -- 143/117 -- 96    09/19/23 1615 -- -- 139 -- 155/115 -- 97    09/19/23 1458 99.1 (37.3) Oral 150 16 140/112 Sitting 97          Current Facility-Administered Medications   Medication Dose Route Frequency Provider Last Rate Last Admin    [START ON 9/21/2023] !Vancomycin Trough ordered on 9/21 with AM labs@0600. Please allow pharmacy to assess level before giving dose.   Does not apply Once Bharathi Stephens, PharmD        acetaminophen (TYLENOL) tablet 650 mg  650 mg Oral Q4H PRN Willi Kay III, DO        ALPRAZolam (XANAX) tablet 0.25 mg  0.25 mg Oral BID PRN Willi Kay III, DO   0.25 mg at 09/20/23 0339    Calcium Replacement - Follow Nurse / BPA Driven Protocol   Does not apply PRN Willi Kay III, DO        cefTRIAXone (ROCEPHIN) 1000 mg/100 mL 0.9% NS (MBP)  1,000 mg Intravenous Q24H Willi Kay III, DO   1,000 mg at 09/19/23 2134    dextrose (D50W) (25 g/50 mL) IV injection 25 g  25 g Intravenous Q15 Min PRN Willi Kay III, DO        dextrose (GLUTOSE) oral gel 15 g  15 g Oral Q15 Min PRN Willi Kay III, DO        glucagon (GLUCAGEN) injection 1 mg  1 mg Intramuscular Q15 Min PRN Willi Kay III, DO        HYDROcodone-acetaminophen (NORCO) 5-325 MG per tablet 1 tablet  1 tablet Oral Q4H PRN Willi Kay III, DO        hyoscyamine (LEVSIN) SL tablet 125 mcg  125 mcg Oral Q4H PRN Willi Kay III, DO        insulin detemir (LEVEMIR) injection 10 Units  10 Units Subcutaneous Q12H Willi Kay III, DO   10 Units at 09/20/23 0824    Insulin Lispro (humaLOG) injection 2-7 Units  2-7 Units Subcutaneous 4x Daily AC & at Bedtime Willi Kay III, DO   6 Units at 09/20/23 0823    lisinopril (PRINIVIL,ZESTRIL) tablet 10 mg  10 mg Oral Daily  Willi Kay III, DO   10 mg at 09/20/23 0823    Magnesium Standard Dose Replacement - Follow Nurse / BPA Driven Protocol   Does not apply PRN Willi Kay III, DO        melatonin tablet 5 mg  5 mg Oral Nightly PRN Willi Kay III, DO        metoclopramide (REGLAN) injection 10 mg  10 mg Intravenous Q6H PRN Willi Kay III, DO   10 mg at 09/20/23 0607    morphine injection 1 mg  1 mg Intravenous Q4H PRN Willi Kay III, DO        And    naloxone (NARCAN) injection 0.4 mg  0.4 mg Intravenous Q5 Min PRN Willi Kay III, DO        nitroglycerin (NITROSTAT) SL tablet 0.4 mg  0.4 mg Sublingual Q5 Min PRN Willi Kay III, DO        ondansetron (ZOFRAN) injection 4 mg  4 mg Intravenous Q6H PRN Willi Kay III, DO   4 mg at 09/20/23 0927    pantoprazole (PROTONIX) injection 40 mg  40 mg Intravenous Q AM Willi Kay III, DO   40 mg at 09/20/23 0408    Pharmacy to dose vancomycin   Does not apply Continuous PRN Willi Kay III, DO        Phosphorus Replacement - Follow Nurse / BPA Driven Protocol   Does not apply PRN Willi Kay III, DO        Potassium Replacement - Follow Nurse / BPA Driven Protocol   Does not apply PRN Willi Kay III, DO        sodium chloride 0.9 % flush 10 mL  10 mL Intravenous PRN Francis Ortiz MD        sodium chloride 0.9 % flush 10 mL  10 mL Intravenous PRN Joaquim Silva APRN        sodium chloride 0.9 % flush 10 mL  10 mL Intravenous Q12H Willi Kay III, DO        sodium chloride 0.9 % flush 10 mL  10 mL Intravenous PRN Willi Kay III, DO        sodium chloride 0.9 % infusion 40 mL  40 mL Intravenous PRN Willi Kay III, DO        sodium chloride 0.9 % infusion  125 mL/hr Intravenous Continuous Willi Kay III,  mL/hr at 09/19/23 2220 125 mL/hr at 09/19/23 2220    vancomycin in dextrose 5% 150 mL  (VANCOCIN) IVPB 750 mg  750 mg Intravenous Q8H Bharathi StephensNadeem   750 mg at 09/20/23 0827     Lab Results (all)       Procedure Component Value Units Date/Time    Magnesium [[id number]]  (Normal) Collected: 09/20/23 0821    Specimen: Blood Updated: 09/20/23 0903     Magnesium 1.9 mg/dL     Comprehensive Metabolic Panel [[id number]]  (Abnormal) Collected: 09/20/23 0821    Specimen: Blood Updated: 09/20/23 0903     Glucose 230 mg/dL      BUN 15 mg/dL      Creatinine 0.57 mg/dL      Sodium 138 mmol/L      Potassium 3.5 mmol/L      Chloride 99 mmol/L      CO2 26.0 mmol/L      Calcium 9.4 mg/dL      Total Protein 7.2 g/dL      Albumin 4.6 g/dL      ALT (SGPT) 11 U/L      AST (SGOT) 9 U/L      Alkaline Phosphatase 63 U/L      Total Bilirubin 1.1 mg/dL      Globulin 2.6 gm/dL      Comment: Calculated Result        A/G Ratio 1.8 g/dL      BUN/Creatinine Ratio 26.3     Anion Gap 13.0 mmol/L      eGFR 137.8 mL/min/1.73     Narrative:      GFR Normal >60  Chronic Kidney Disease <60  Kidney Failure <15      CBC Auto Differential [[id number]]  (Abnormal) Collected: 09/20/23 0821    Specimen: Blood Updated: 09/20/23 0836     WBC 9.14 10*3/mm3      RBC 4.64 10*6/mm3      Hemoglobin 14.0 g/dL      Hematocrit 40.2 %      MCV 86.6 fL      MCH 30.2 pg      MCHC 34.8 g/dL      RDW 12.6 %      RDW-SD 39.6 fl      MPV 10.1 fL      Platelets 256 10*3/mm3      Neutrophil % 79.7 %      Lymphocyte % 11.1 %      Monocyte % 8.8 %      Eosinophil % 0.0 %      Basophil % 0.1 %      Immature Grans % 0.3 %      Neutrophils, Absolute 7.29 10*3/mm3      Lymphocytes, Absolute 1.01 10*3/mm3      Monocytes, Absolute 0.80 10*3/mm3      Eosinophils, Absolute 0.00 10*3/mm3      Basophils, Absolute 0.01 10*3/mm3      Immature Grans, Absolute 0.03 10*3/mm3      nRBC 0.0 /100 WBC     POC Glucose Once [[id number]]  (Abnormal) Collected: 09/20/23 0752    Specimen: Blood Updated: 09/20/23 0754     Glucose 374 mg/dL     STAT Lactic Acid, Reflex [[id number]]   (Normal) Collected: 09/19/23 2249    Specimen: Blood Updated: 09/19/23 2346     Lactate 1.8 mmol/L      Comment: Falsely depressed results may occur on samples drawn from patients receiving N-Acetylcysteine (NAC) or Metamizole.       POC Glucose Once [[id number]]  (Abnormal) Collected: 09/19/23 2240    Specimen: Blood Updated: 09/19/23 2242     Glucose 243 mg/dL     Blood Culture - Blood, Wrist, Right [[id number]] Collected: 09/19/23 2128    Specimen: Blood from Wrist, Right Updated: 09/19/23 2205    Blood Culture - Blood, Arm, Left [[id number]] Collected: 09/19/23 2124    Specimen: Blood from Arm, Left Updated: 09/19/23 2205    Ketone Bodies, Serum (Not performed at [place]) [[id number]]  (Abnormal) Collected: 09/19/23 2127    Specimen: Blood Updated: 09/19/23 2155    Narrative:      The following orders were created for panel order Ketone Bodies, Serum (Not performed at [place]).  Procedure                               Abnormality         Status                     ---------                               -----------         ------                     Beta Hydroxybutyrate Carlos A...[[id number]]  Abnormal            Final result                 Please view results for these tests on the individual orders.    Beta Hydroxybutyrate Quantitative [[id number]]  (Abnormal) Collected: 09/19/23 2127    Specimen: Blood Updated: 09/19/23 2155     Beta-Hydroxybutyrate Quant 0.462 mmol/L     Narrative:      In the assessment of possible diabetic ketoacidosis, the test should be interpreted along with other clinical and laboratory findings.  A level greater than 1 mmol/L should require further evaluation and levels of more than 3 mmol/L require immediate medical review.    STAT Lactic Acid, Reflex [[id number]]  (Abnormal) Collected: 09/19/23 2127    Specimen: Blood Updated: 09/19/23 2154     Lactate 2.2 mmol/L      Comment: Falsely depressed results may occur on samples drawn from patients receiving N-Acetylcysteine (NAC) or Metamizole.        POC Glucose Once [[id number]]  (Abnormal) Collected: 09/19/23 2028    Specimen: Blood Updated: 09/19/23 2029     Glucose 260 mg/dL     [place] Draw [[id number]] Collected: 09/19/23 1552    Specimen: Blood Updated: 09/19/23 2000    Narrative:      The following orders were created for panel order [place] Draw.  Procedure                               Abnormality         Status                     ---------                               -----------         ------                     Green Top (Gel)[[id number]]                                  Final result               Lavender Top[[id number]]                                     Final result               Gold Top - SST[[id number]]                                   Final result               Gray Top[[id number]]                                         Final result               Light Blue Top[[id number]]                                   Final result                 Please view results for these tests on the individual orders.    Gray Top [[id number]] Collected: 09/19/23 1552    Specimen: Blood Updated: 09/19/23 2000     Extra Tube Hold for add-ons.     Comment: Auto resulted.       STAT Lactic Acid, Reflex [[id number]]  (Abnormal) Collected: 09/19/23 1903    Specimen: Blood Updated: 09/19/23 1948     Lactate 3.3 mmol/L      Comment: Falsely depressed results may occur on samples drawn from patients receiving N-Acetylcysteine (NAC) or Metamizole.       Fentanyl, Urine - Urine, Clean Catch [[id number]]  (Normal) Collected: 09/19/23 1751    Specimen: Urine, Clean Catch Updated: 09/19/23 1830     Fentanyl, Urine Negative    Narrative:      Negative Threshold:      Fentanyl 5 ng/mL     The normal value for the drug tested is negative. This report includes final unconfirmed screening results to be used for medical treatment purposes only. Unconfirmed results must not be used for non-medical purposes such as employment or legal testing. Clinical consideration should be applied  to any drug of abuse test, particularly when unconfirmed results are used.           Urine Drug Screen - Urine, Clean Catch [[id number]]  (Abnormal) Collected: 09/19/23 1751    Specimen: Urine, Clean Catch Updated: 09/19/23 1814     THC, Screen, Urine Positive     Phencyclidine (PCP), Urine Negative     Cocaine Screen, Urine Negative     Methamphetamine, Ur Negative     Opiate Screen Negative     Amphetamine Screen, Urine Negative     Benzodiazepine Screen, Urine Negative     Tricyclic Antidepressants Screen Negative     Methadone Screen, Urine Negative     Barbiturates Screen, Urine Negative     Oxycodone Screen, Urine Negative     Propoxyphene Screen Negative     Buprenorphine, Screen, Urine Negative    Narrative:      Cutoff For Drugs Screened:    Amphetamines               500 ng/ml  Barbiturates               200 ng/ml  Benzodiazepines            150 ng/ml  Cocaine                    150 ng/ml  Methadone                  200 ng/ml  Opiates                    100 ng/ml  Phencyclidine               25 ng/ml  THC                            50 ng/ml  Methamphetamine            500 ng/ml  Tricyclic Antidepressants  300 ng/ml  Oxycodone                  100 ng/ml  Propoxyphene               300 ng/ml  Buprenorphine               10 ng/ml    The normal value for all drugs tested is negative. This report includes unconfirmed screening results, with the cutoff values listed, to be used for medical treatment purposes only.  Unconfirmed results must not be used for non-medical purposes such as employment or legal testing.  Clinical consideration should be applied to any drug of abuse test, particularly when unconfirmed results are used.      Urinalysis With Microscopic If Indicated (No Culture) - Urine, Clean Catch [[id number]]  (Abnormal) Collected: 09/19/23 1751    Specimen: Urine, Clean Catch Updated: 09/19/23 1806     Color, UA Yellow     Appearance, UA Clear     pH, UA 6.0     Specific Gravity, UA 1.045     Glucose, UA  >=1000 mg/dL (3+)     Ketones, UA 80 mg/dL (3+)     Bilirubin, UA Negative     Blood, UA Negative     Protein, UA >=300 mg/dL (3+)     Leuk Esterase, UA Negative     Nitrite, UA Negative     Urobilinogen, UA 1.0 E.U./dL    Urinalysis, Microscopic Only - Urine, Clean Catch [[id number]] Collected: 09/19/23 1751    Specimen: Urine, Clean Catch Updated: 09/19/23 1806     RBC, UA 0-2 /HPF      WBC, UA 0-2 /HPF      Bacteria, UA None Seen /HPF      Squamous Epithelial Cells, UA 0-2 /HPF      Hyaline Casts, UA 0-6 /LPF      Methodology Automated Microscopy    COVID PRE-OP / PRE-PROCEDURE SCREENING ORDER (NO ISOLATION) - Swab, Nasopharynx [[id number]]  (Normal) Collected: 09/19/23 1559    Specimen: Swab from Nasopharynx Updated: 09/19/23 1708    Narrative:      The following orders were created for panel order COVID PRE-OP / PRE-PROCEDURE SCREENING ORDER (NO ISOLATION) - Swab, Nasopharynx.  Procedure                               Abnormality         Status                     ---------                               -----------         ------                     Respiratory Panel PCR w/...[[id number]]  Normal              Final result                 Please view results for these tests on the individual orders.    Respiratory Panel PCR w/COVID-19(SARS-CoV-2) LIANNE/CASEY/EVERETTE/PAD/COR/MAD/MELANIE In-House, NP Swab in UTM/VTM, 3-4 HR TAT - Swab, Nasopharynx [[id number]]  (Normal) Collected: 09/19/23 1559    Specimen: Swab from Nasopharynx Updated: 09/19/23 1708     ADENOVIRUS, PCR Not Detected     Coronavirus 229E Not Detected     Coronavirus HKU1 Not Detected     Coronavirus NL63 Not Detected     Coronavirus OC43 Not Detected     COVID19 Not Detected     Human Metapneumovirus Not Detected     Human Rhinovirus/Enterovirus Not Detected     Influenza A PCR Not Detected     Influenza B PCR Not Detected     Parainfluenza Virus 1 Not Detected     Parainfluenza Virus 2 Not Detected     Parainfluenza Virus 3 Not Detected     Parainfluenza Virus 4  Not Detected     RSV, PCR Not Detected     Bordetella pertussis pcr Not Detected     Bordetella parapertussis PCR Not Detected     Chlamydophila pneumoniae PCR Not Detected     Mycoplasma pneumo by PCR Not Detected    Narrative:      In the setting of a positive respiratory panel with a viral infection PLUS a negative procalcitonin without other underlying concern for bacterial infection, consider observing off antibiotics or discontinuation of antibiotics and continue supportive care. If the respiratory panel is positive for atypical bacterial infection (Bordetella pertussis, Chlamydophila pneumoniae, or Mycoplasma pneumoniae), consider antibiotic de-escalation to target atypical bacterial infection.    Light Blue Top [[id number]] Collected: 09/19/23 1552    Specimen: Blood Updated: 09/19/23 1700     Extra Tube Hold for add-ons.     Comment: Auto resulted       Green Top (Gel) [[id number]] Collected: 09/19/23 1552    Specimen: Blood Updated: 09/19/23 1700     Extra Tube Hold for add-ons.     Comment: Auto resulted.       Lavender Top [[id number]] Collected: 09/19/23 1552    Specimen: Blood Updated: 09/19/23 1700     Extra Tube hold for add-on     Comment: Auto resulted       Gold Top - SST [[id number]] Collected: 09/19/23 1552    Specimen: Blood Updated: 09/19/23 1700     Extra Tube Hold for add-ons.     Comment: Auto resulted.       Lactic Acid, Plasma [[id number]]  (Abnormal) Collected: 09/19/23 1552    Specimen: Blood Updated: 09/19/23 1633     Lactate 2.9 mmol/L      Comment: Falsely depressed results may occur on samples drawn from patients receiving N-Acetylcysteine (NAC) or Metamizole.       Comprehensive Metabolic Panel [[id number]]  (Abnormal) Collected: 09/19/23 1552    Specimen: Blood Updated: 09/19/23 1630     Glucose 361 mg/dL      BUN 29 mg/dL      Creatinine 0.83 mg/dL      Sodium 142 mmol/L      Potassium 3.6 mmol/L      Comment: Slight hemolysis detected by analyzer. Results may be affected.         Chloride 93 mmol/L      CO2 32.0 mmol/L      Calcium 10.7 mg/dL      Total Protein 8.3 g/dL      Albumin 5.5 g/dL      ALT (SGPT) 15 U/L      AST (SGOT) 11 U/L      Alkaline Phosphatase 82 U/L      Total Bilirubin 1.0 mg/dL      Globulin 2.8 gm/dL      Comment: Calculated Result        A/G Ratio 2.0 g/dL      BUN/Creatinine Ratio 34.9     Anion Gap 17.0 mmol/L      eGFR 123.0 mL/min/1.73     Narrative:      GFR Normal >60  Chronic Kidney Disease <60  Kidney Failure <15      CK [[id number]]  (Normal) Collected: 09/19/23 1552    Specimen: Blood Updated: 09/19/23 1630     Creatine Kinase 29 U/L     Ethanol [[id number]]  (Normal) Collected: 09/19/23 1552    Specimen: Blood Updated: 09/19/23 1630     Ethanol <10 mg/dL     Narrative:      Elevated lactic acid concentration and lactate dehydrogenase(LD) activity may falsely elevate enzymatically determined ethanol levels. Not for legal purposes.     Hemoglobin A1c [[id number]]  (Abnormal) Collected: 09/19/23 1552    Specimen: Blood Updated: 09/19/23 1624     Hemoglobin A1C 7.90 %     Narrative:      Hemoglobin A1C Ranges:    Increased Risk for Diabetes  5.7% to 6.4%  Diabetes                     >= 6.5%  Diabetic Goal                < 7.0%    CBC & Differential [[id number]]  (Abnormal) Collected: 09/19/23 1552    Specimen: Blood Updated: 09/19/23 1608    Narrative:      The following orders were created for panel order CBC & Differential.  Procedure                               Abnormality         Status                     ---------                               -----------         ------                     CBC Auto Differential[[id number]]        Abnormal            Final result                 Please view results for these tests on the individual orders.    CBC Auto Differential [[id number]]  (Abnormal) Collected: 09/19/23 1552    Specimen: Blood Updated: 09/19/23 1608     WBC 8.23 10*3/mm3      RBC 5.23 10*6/mm3      Hemoglobin 15.5 g/dL      Hematocrit 44.9 %      MCV  85.9 fL      MCH 29.6 pg      MCHC 34.5 g/dL      RDW 12.5 %      RDW-SD 38.9 fl      MPV 10.5 fL      Platelets 292 10*3/mm3      Neutrophil % 90.5 %      Lymphocyte % 3.9 %      Monocyte % 5.0 %      Eosinophil % 0.1 %      Basophil % 0.1 %      Immature Grans % 0.4 %      Neutrophils, Absolute 7.45 10*3/mm3      Lymphocytes, Absolute 0.32 10*3/mm3      Monocytes, Absolute 0.41 10*3/mm3      Eosinophils, Absolute 0.01 10*3/mm3      Basophils, Absolute 0.01 10*3/mm3      Immature Grans, Absolute 0.03 10*3/mm3      nRBC 0.0 /100 WBC     Blood Gas, Venous With Co-Ox [[id number]]  (Abnormal) Collected: 09/19/23 1549    Specimen: Venous Blood Updated: 09/19/23 1550     Site Nurse/Dr Draw     pH, Venous 7.454 pH Units      Comment: 83 Value above reference range        pCO2, Venous 50.7 mm Hg      pO2, Venous 47.9 mm Hg      HCO3, Venous 35.6 mmol/L      Base Excess, Venous 9.6 mmol/L      Hemoglobin, Blood Gas 16.3 g/dL      Oxyhemoglobin Venous 78.9 %      Comment: 83 Value above reference range        Methemoglobin Venous 0.4 %      Carboxyhemoglobin Venous 1.3 %      CO2 Content 37.1 mmol/L      Temperature 37.0 C      Barometric Pressure for Blood Gas --     Comment: N/A        Modality Room Air     FIO2 21 %      Rate 0 Breaths/minute      PIP 0 cmH2O      Comment: Meter: [id number]     :  [id number]        IPAP 0     EPAP 0    POC Glucose Once [[id number]]  (Abnormal) Collected: 09/19/23 1456    Specimen: Blood Updated: 09/19/23 1458     Glucose 302 mg/dL           Imaging Results (All)       Procedure Component Value Units Date/Time    MRI Foot Right Without Contrast [[id number]] Collected: 09/19/23 2001     Updated: 09/19/23 2012    Narrative:        MRI FOOT RIGHT WO CONTRAST    Date of Exam: 9/19/2023 5:50 PM EDT    Indication: right great toe swelling. eval for osteo..     Comparison: None available.    Technique:  Routine multiplanar/multisequence sequence images of the right foot were obtained  without contrast administration.      Findings:  There is edema within the soft tissues overlying the distal phalanx of the first digit particularly of the lung the nail bed and most distal soft tissues overlying the tuft. There is subtle edema within the tuft of the distal phalanx of the first digit.   There is no cortical destruction. No intraosseous fluid collection. No soft tissue fluid collection. Visualized portions of the extent and flexor tendons appear intact.    Impression:      Impression:    1. Soft tissue edema overlying the distal phalanx of the first digit with subtle underlying bone edema which is nonspecific and may be reactive. No cortical destruction identified to suggest osteomyelitis at this time. No evidence of soft tissue or   intraosseous abscess.        Electronically Signed: Fabrizio Dietrich MD    9/19/2023 8:08 PM EDT    Workstation ID: [id number]    CT Abdomen Pelvis Without Contrast [[id number]] Collected: 09/19/23 1724     Updated: 09/19/23 1729    Narrative:      CT ABDOMEN PELVIS WO CONTRAST    Date of Exam: 9/19/2023 4:17 PM CDT    Indication: nv abd pain.    Comparison: 3/9/2023    Technique: Axial CT images were obtained of the abdomen and pelvis without the administration of contrast. Reconstructed coronal and sagittal images were also obtained. Automated exposure control and iterative construction methods were used.      Findings:  LUNG BASES:  Unremarkable without mass or infiltrate.    LIVER:  Unremarkable parenchyma without focal lesion.  BILIARY/GALLBLADDER: There is sludge within the gallbladder. There are no gallbladder inflammatory changes.  SPLEEN:  Unremarkable  PANCREAS:  Unremarkable  ADRENAL:  Unremarkable  KIDNEYS:  Unremarkable parenchyma with no solid mass identified. No obstruction.  No calculus identified.  GASTROINTESTINAL/MESENTERY:  No evidence of obstruction nor inflammation.  The appendix is normal.  AORTA/IVC:  Normal caliber.    RETROPERITONEUM/LYMPH NODES:   Unremarkable    REPRODUCTIVE:  Unremarkable  BLADDER:  Unremarkable    OSSEUS STRUCTURES:  Typical for age with no acute process identified.      Impression:      Impression:  1.No acute process identified.            Electronically Signed: Dejan Delgado MD    9/19/2023 4:26 PM CDT    Workstation ID: [id number]    XR Foot 3+ View Right [[id number]] Collected: 09/19/23 1618     Updated: 09/19/23 1621    Narrative:      XR FOOT 3+ VW RIGHT    Date of Exam: 9/19/2023 2:26 PM CDT    Indication: right great toe infections. osteo?    Comparison: None available.    Findings:  No evidence of acute fracture nor dislocation. Alignment is normal. Joint space is adequately maintained. No significant effusion identified. Soft tissues are unremarkable. No radiopaque foreign body identified.      Impression:      Impression:  No acute osseous process identified. No radiographic evidence of osteomyelitis.      Electronically Signed: Dejan Delgado MD    9/19/2023 3:18 PM CDT    Workstation ID: [id number]    XR Chest 1 View [[id number]] Collected: 09/19/23 1614     Updated: 09/19/23 1618    Narrative:      XR CHEST 1 VW    Date of Exam: 9/19/2023 3:26 PM EDT    Indication: dka. cough.    Comparison: March 9, 2023    Findings:  Heart not definitely enlarged. Lungs seem clear. There are no pleural effusions. Visualized osseous structures do not appear unusual.      Impression:      Impression:  1.An acute pulmonary process is not apparent.      Electronically Signed: Gm Quiroz MD    9/19/2023 4:15 PM EDT    Workstation ID: [id number]          ECG/EMG Results (all)       Procedure Component Value Units Date/Time    ECG 12 Lead QT Measurement [[id number]] Collected: 09/20/23 0524     Updated: 09/20/23 0735     QT Interval 376 ms      QTC Interval 457 ms     Narrative:      Test Reason : QT Measurement  Blood Pressure :   */*   mmHG  Vent. Rate :  89 BPM     Atrial Rate :  89 BPM     P-R Int : 120 ms          QRS Dur :  78 ms      QT Int : 376  ms       P-R-T Axes :  75  48  42 degrees     QTc Int : 457 ms    Normal sinus rhythm  Normal ECG  When compared with ECG of 20-SEP-2023 05:24, (Unconfirmed)  Previous ECG has undetermined rhythm, needs review    Referred By:            Confirmed By:     ECG 12 Lead Tachycardia [[id number]] Collected: 09/19/23 1506     Updated: 09/20/23 0751     QT Interval 278 ms      QTC Interval 429 ms     Narrative:      Test Reason : Tachycardia  Blood Pressure :   */*   mmHG  Vent. Rate : 143 BPM     Atrial Rate : 143 BPM     P-R Int : 124 ms          QRS Dur :  80 ms      QT Int : 278 ms       P-R-T Axes :  79  67  66 degrees     QTc Int : 429 ms    Sinus tachycardia  Otherwise normal ECG  When compared with ECG of 07-MAY-2023 13:38,  Vent. rate has increased BY  59 BPM  Nonspecific T wave abnormality now evident in Lateral leads    Referred By: ED           Confirmed By:

## 2023-09-20 NOTE — PROGRESS NOTES
[facility] Medicine Services  PROGRESS NOTE    Patient Name: Ford Mukherjee Jr.  : 1995  MRN: [id number]    Date of Admission: 2023  Primary Care Physician: Shalini Goodson APRN    Subjective   Subjective     CC:  Intractable nausea and vomiting    HPI:  Patient is a 28-year-old male seen and examined by me this a.m., he was admitted overnight with intractable nausea and vomiting as well as uncontrolled type 2 diabetes.  Patient has a history of noncompliance and currently does not have a regular PCP or take regular medications for his diabetes at home.  Patient confirms with me that he has been diagnosed with type 2 diabetes not type I but does not have general frame or habitus of normal Type II DM. He also reports issues with likely ingrown right great toenail after hitting his foot while working, he has had to see podiatry for this problem in the past. Plans to make some adjustments to his antibiotics. Continued supportive measures at this time.       Objective   Objective     Vital Signs:   Temp:  [98.7 °F (37.1 °C)-99.8 °F (37.7 °C)] 98.7 °F (37.1 °C)  Heart Rate:  [] 93  Resp:  [16-18] 18  BP: (104-179)/() 162/111     Physical Exam:  Constitutional: No acute distress, awake, alert and oriented x 3   HENT: NCAT, nares patent, mucous membranes moist  Respiratory: Clear to auscultation bilaterally, respiratory effort normal   Cardiovascular: RRR, no murmurs, rubs, or gallops  Gastrointestinal: Positive bowel sounds, soft, nontender, nondistended  Musculoskeletal: No bilateral ankle edema, R great toe appears ingrown, some mild erythema surround nail, no drainage   Psychiatric: Appropriate affect, cooperative  Neurologic: Oriented x 3, strength symmetric in all extremities, Cranial Nerves grossly intact to confrontation, speech clear  Skin: No rashes      Results Reviewed:  LAB RESULTS:      Lab 23  0821 23  2403 23  09/19/23  1903 09/19/23  1552   WBC 9.14  --   --   --  8.23   HEMOGLOBIN 14.0  --   --   --  15.5   HEMATOCRIT 40.2  --   --   --  44.9   PLATELETS 256  --   --   --  292   NEUTROS ABS 7.29*  --   --   --  7.45*   IMMATURE GRANS (ABS) 0.03  --   --   --  0.03   LYMPHS ABS 1.01  --   --   --  0.32*   MONOS ABS 0.80  --   --   --  0.41   EOS ABS 0.00  --   --   --  0.01   MCV 86.6  --   --   --  85.9   LACTATE  --  1.8 2.2* 3.3* 2.9*         Lab 09/20/23  0821 09/19/23  1552   SODIUM 138 142   POTASSIUM 3.5 3.6   CHLORIDE 99 93*   CO2 26.0 32.0*   ANION GAP 13.0 17.0*   BUN 15 29*   CREATININE 0.57* 0.83   EGFR 137.8 123.0   GLUCOSE 230* 361*   CALCIUM 9.4 10.7*   MAGNESIUM 1.9  --    HEMOGLOBIN A1C  --  7.90*         Lab 09/20/23  0821 09/19/23  1552   TOTAL PROTEIN 7.2 8.3   ALBUMIN 4.6 5.5*   GLOBULIN 2.6 2.8   ALT (SGPT) 11 15   AST (SGOT) 9 11   BILIRUBIN 1.1 1.0   ALK PHOS 63 82                     Lab 09/19/23  1549   FIO2 21   CARBOXYHEMOGLOBIN (VENOUS) 1.3     Brief Urine Lab Results  (Last result in the past 365 days)        Color   Clarity   Blood   Leuk Est   Nitrite   Protein   CREAT   Urine HCG        09/19/23 1751 Yellow   Clear   Negative   Negative   Negative   >=300 mg/dL (3+)                   Microbiology Results Abnormal       Procedure Component Value - Date/Time    Blood Culture - Blood, Wrist, Right [[id number]] Collected: 09/19/23 2128    Lab Status: Preliminary result Specimen: Blood from Wrist, Right Updated: 09/20/23 1115    Narrative:      Aerobic bottle only    COVID PRE-OP / PRE-PROCEDURE SCREENING ORDER (NO ISOLATION) - Swab, Nasopharynx [[id number]]  (Normal) Collected: 09/19/23 1559    Lab Status: Final result Specimen: Swab from Nasopharynx Updated: 09/19/23 6533    Narrative:      The following orders were created for panel order COVID PRE-OP / PRE-PROCEDURE SCREENING ORDER (NO ISOLATION) - Swab, Nasopharynx.  Procedure                               Abnormality         Status                      ---------                               -----------         ------                     Respiratory Panel PCR w/...[[id number]]  Normal              Final result                 Please view results for these tests on the individual orders.    Respiratory Panel PCR w/COVID-19(SARS-CoV-2) LIANNE/CASEY/EVERETTE/PAD/COR/MAD/MELANIE In-House, NP Swab in UTM/VTM, 3-4 HR TAT - Swab, Nasopharynx [[id number]]  (Normal) Collected: 09/19/23 6869    Lab Status: Final result Specimen: Swab from Nasopharynx Updated: 09/19/23 1709     ADENOVIRUS, PCR Not Detected     Coronavirus 229E Not Detected     Coronavirus HKU1 Not Detected     Coronavirus NL63 Not Detected     Coronavirus OC43 Not Detected     COVID19 Not Detected     Human Metapneumovirus Not Detected     Human Rhinovirus/Enterovirus Not Detected     Influenza A PCR Not Detected     Influenza B PCR Not Detected     Parainfluenza Virus 1 Not Detected     Parainfluenza Virus 2 Not Detected     Parainfluenza Virus 3 Not Detected     Parainfluenza Virus 4 Not Detected     RSV, PCR Not Detected     Bordetella pertussis pcr Not Detected     Bordetella parapertussis PCR Not Detected     Chlamydophila pneumoniae PCR Not Detected     Mycoplasma pneumo by PCR Not Detected    Narrative:      In the setting of a positive respiratory panel with a viral infection PLUS a negative procalcitonin without other underlying concern for bacterial infection, consider observing off antibiotics or discontinuation of antibiotics and continue supportive care. If the respiratory panel is positive for atypical bacterial infection (Bordetella pertussis, Chlamydophila pneumoniae, or Mycoplasma pneumoniae), consider antibiotic de-escalation to target atypical bacterial infection.            CT Abdomen Pelvis Without Contrast    Result Date: 9/19/2023  CT ABDOMEN PELVIS WO CONTRAST Date of Exam: 9/19/2023 4:17 PM CDT Indication: nv abd pain. Comparison: 3/9/2023 Technique: Axial CT images were obtained of the  abdomen and pelvis without the administration of contrast. Reconstructed coronal and sagittal images were also obtained. Automated exposure control and iterative construction methods were used. Findings: LUNG BASES:  Unremarkable without mass or infiltrate. LIVER:  Unremarkable parenchyma without focal lesion. BILIARY/GALLBLADDER: There is sludge within the gallbladder. There are no gallbladder inflammatory changes. SPLEEN:  Unremarkable PANCREAS:  Unremarkable ADRENAL:  Unremarkable KIDNEYS:  Unremarkable parenchyma with no solid mass identified. No obstruction.  No calculus identified. GASTROINTESTINAL/MESENTERY:  No evidence of obstruction nor inflammation.  The appendix is normal. AORTA/IVC:  Normal caliber. RETROPERITONEUM/LYMPH NODES:  Unremarkable REPRODUCTIVE:  Unremarkable BLADDER:  Unremarkable OSSEUS STRUCTURES:  Typical for age with no acute process identified.     Impression: Impression: 1.No acute process identified. Electronically Signed: Dejan Delgado MD  9/19/2023 4:26 PM CDT  Workstation ID: [id number]    XR Foot 3+ View Right    Result Date: 9/19/2023  XR FOOT 3+ VW RIGHT Date of Exam: 9/19/2023 2:26 PM CDT Indication: right great toe infections. osteo? Comparison: None available. Findings: No evidence of acute fracture nor dislocation. Alignment is normal. Joint space is adequately maintained. No significant effusion identified. Soft tissues are unremarkable. No radiopaque foreign body identified.     Impression: Impression: No acute osseous process identified. No radiographic evidence of osteomyelitis. Electronically Signed: Dejan Delgado MD  9/19/2023 3:18 PM CDT  Workstation ID: [id number]    XR Chest 1 View    Result Date: 9/19/2023  XR CHEST 1 VW Date of Exam: 9/19/2023 3:26 PM EDT Indication: dka. cough. Comparison: March 9, 2023 Findings: Heart not definitely enlarged. Lungs seem clear. There are no pleural effusions. Visualized osseous structures do not appear unusual.     Impression:  Impression: 1.An acute pulmonary process is not apparent. Electronically Signed: Gm Quiroz MD  9/19/2023 4:15 PM EDT  Workstation ID: [id number]    MRI Foot Right Without Contrast    Result Date: 9/19/2023  MRI FOOT RIGHT WO CONTRAST Date of Exam: 9/19/2023 5:50 PM EDT Indication: right great toe swelling. eval for osteo..  Comparison: None available. Technique:  Routine multiplanar/multisequence sequence images of the right foot were obtained without contrast administration. Findings: There is edema within the soft tissues overlying the distal phalanx of the first digit particularly of the lung the nail bed and most distal soft tissues overlying the tuft. There is subtle edema within the tuft of the distal phalanx of the first digit. There is no cortical destruction. No intraosseous fluid collection. No soft tissue fluid collection. Visualized portions of the extent and flexor tendons appear intact.    Impression: Impression: 1. Soft tissue edema overlying the distal phalanx of the first digit with subtle underlying bone edema which is nonspecific and may be reactive. No cortical destruction identified to suggest osteomyelitis at this time. No evidence of soft tissue or intraosseous abscess. Electronically Signed: Fabrizio Dietrich MD  9/19/2023 8:08 PM EDT  Workstation ID: [id number]         Current medications:  Scheduled Meds:[START ON 9/21/2023] !Vancomycin Level Draw Needed, , Does not apply, Once  cefTRIAXone, 1,000 mg, Intravenous, Q24H  insulin detemir, 10 Units, Subcutaneous, Q12H  insulin lispro, 2-7 Units, Subcutaneous, 4x Daily AC & at Bedtime  lisinopril, 10 mg, Oral, Daily  pantoprazole, 40 mg, Intravenous, Q AM  potassium chloride, 10 mEq, Intravenous, Q1H  sodium chloride, 10 mL, Intravenous, Q12H  vancomycin, 750 mg, Intravenous, Q8H      Continuous Infusions:Pharmacy to dose vancomycin,   sodium chloride, 75 mL/hr, Last Rate: 75 mL/hr (09/20/23 1046)      PRN Meds:.  acetaminophen    ALPRAZolam     Calcium Replacement - Follow Nurse / BPA Driven Protocol    dextrose    dextrose    glucagon (human recombinant)    hydrALAZINE    HYDROcodone-acetaminophen    hyoscyamine    Magnesium Standard Dose Replacement - Follow Nurse / BPA Driven Protocol    melatonin    metoclopramide    Morphine **AND** naloxone    nitroglycerin    ondansetron    Pharmacy to dose vancomycin    Phosphorus Replacement - Follow Nurse / BPA Driven Protocol    Potassium Replacement - Follow Nurse / BPA Driven Protocol    prochlorperazine **OR** prochlorperazine **OR** prochlorperazine    sodium chloride    [COMPLETED] Insert Peripheral IV **AND** sodium chloride    sodium chloride    sodium chloride    Assessment & Plan   Assessment & Plan     Active Hospital Problems    Diagnosis  POA    **Intractable nausea and vomiting [R11.2]  Yes      Resolved Hospital Problems   No resolved problems to display.        Brief Hospital Course to date:  28M with uncontrolled type 2 diabetes, intractable nausea/emesis, also likely R ingrown toenail with surrounding erythema, plans are for outpatient podiatry follow up. Patient reports longstanding history of Type II DM diagnosis, does not have expected habitus or presentation of Type II DM, will plan to order C-peptide level on patient. Continued supportive care at this time, patient does not regularly take medication outpatient or have PCP, CM working on establishing with new PCP.      Intractable nausea/emesis  - IV Zofran as needed; can use IV Reglan if this is effective.  - Diabetic diet currently ordered, can scale back to clear liquids or n.p.o. if the patient cannot tolerate.  - IVF with 0.9 NS.  - We will also add IV Protonix daily.     Uncontrolled type 2 diabetes  - Levemir 10 units subcu twice daily.  -- A1C 7.9   - Add SSI coverage with Accu-Cheks AC/at bedtime.  - IVF with 0.9 NS.  - Diabetes educator.  - Obtain C-peptide level, patient confirms type II diagnosis but is also noncompliant with  outpatient medical care.      Right great toe lesion  - Wound care nurse consult.  - Likely ingrown toenail, outpatient podiatry follow up, will transition to PO Clindamycin   - Keep clean and dry.     Occasional alcohol use  Positive THC on UDS  - His tachycardia currently is likely secondary to dehydration.  - Patient states he has not used any marijuana in a month despite the positive result; nausea/emesis may also be related to THC use.  - No other signs of withdrawal at this time; can add CIWA protocol if needed.  - Low-dose as needed Xanax for anxiety.  - EtOH negative         DVT prophylaxis:  scds    Expected Discharge Location and Transportation: Home   Expected Discharge 9/21  Expected Discharge Date: 9/21/2023; Expected Discharge Time:      DVT prophylaxis:  Mechanical DVT prophylaxis orders are present.          CODE STATUS:   Code Status and Medical Interventions:   Ordered at: 09/19/23 2024     Level Of Support Discussed With:    Patient     Code Status (Patient has no pulse and is not breathing):    CPR (Attempt to Resuscitate)     Medical Interventions (Patient has pulse or is breathing):    Full Support       YAZ Prescott,   09/20/23

## 2023-09-20 NOTE — H&P
"    [facility] Medicine Services  HISTORY AND PHYSICAL    Patient Name: Frod Mukherjee Jr.  : 1995  MRN: [id number]  Primary Care Physician: Shalini Goodson APRN  Date of admission: 2023      Subjective   Subjective     Chief Complaint:  Nausea/emesis    HPI:  Ford Mukherjee Jr. is a 28 y.o. male who states that he has had increased nausea and emesis since  (2 days ago).  No gricel blood in the emesis.  He states his nausea is made worse by any oral intake of food or fluids, but he only feels slightly better after a bout of emesis.  He adds that he \"cannot keep anything down\" over the last 2 days.  Denies abdominal pain, fever, or chills.  Work-up in the ED tonight (Tuesday) included labs which revealed glucose in the mid 300s.  The patient states he does indeed occasionally miss doses of his diabetic medications.  He states he was told he is a type II diabetic, and even though he is 28 years old currently he states he cannot recall the age at which he was diagnosed with diabetes.  He also confirms increased generalized fatigue.  He states he took his diabetic medications last night, but not today, due to his increased oral intake.  He also notes that he has had a right-sided first toe lesion on the medial side of the nail which has looked worse over the last 2 weeks.  Minimal drainage, no pain.  He also denies chest pain, shortness of breath, recent abdominal trauma, bowel habit change, slurred speech/facial droop, focal weakness, or syncope.  He states that other than his type 2 diabetes, he has no other medical problems.  His UDS revealed positive result for THC, but he states he has not used marijuana in a month.  He also states that he occasionally drinks alcohol, \"sometimes but not every day,\" and he last drank on .      Personal History     Past Medical History:   Diagnosis Date    Diabetes mellitus     PT DENIES THIS DX, PREVIOUS DOCUMENTED    " "GI (gastrointestinal bleed)              No past surgical history on file.    Family History: Mother is also diabetic.  Father has glaucoma.    Social History: He states he is a former smoker but has not used any tobacco products of any kind for \"a long time.\"  She confirms occasional alcohol use, most recent drink on this admission was 2 days ago before onset of his symptoms.  He states his last THC use was 1 month ago.  Social History     Social History Narrative    Not on file       Medications:  Available home medication information reviewed.  (Not in a hospital admission)      No Known Allergies    Objective   Objective     Vital Signs:   Temp:  [99.1 °F (37.3 °C)] 99.1 °F (37.3 °C)  Heart Rate:  [] 108  Resp:  [16] 16  BP: (104-170)/() 125/96       Physical Exam   Constitutional: Awake, alert, NAD.  Eyes: PERRLA, sclerae anicteric, dry eyes but no conjunctival injection  HENT: NCAT, mucous membranes.  Neck: Supple, no thyromegaly, no lymphadenopathy, trachea midline  Respiratory: Clear to auscultation bilaterally, nonlabored respirations   Cardiovascular: Tachycardic but regular rhythm at 150 bpm, no murmurs, rubs, or gallops, palpable pedal pulses bilaterally  Gastrointestinal: Positive but markedly decreased frequency of bowel sounds, soft, nontender, nondistended  Musculoskeletal: No bilateral ankle edema, no clubbing or cyanosis to extremities  Psychiatric: Flat affect, minimal eye contact, cooperative  Neurologic: Oriented x 3, strength symmetric in all extremities, Cranial Nerves grossly intact to confrontation, speech clear  Skin: No rashes.  Right great toe has a lesion along medial aspect of the nail, erythematous, crusted material over the area, no active drainage, some erythema to the distal end of the toe and the medial and plantar aspects.  No sensation loss, minimal warmth on palpation.  No pain on palpation.    Result Review:  I have personally reviewed the results from the time of " this admission to 9/19/2023 20:49 EDT and agree with these findings:  [x]  Laboratory list / accordion  []  Microbiology  [x]  Radiology  [x]  EKG/Telemetry   []  Cardiology/Vascular   []  Pathology  []  Old records  []  Other:  Most notable findings include: Reviewed EKG which by my read shows sinus tachycardia with ventricular rate approximately 150 bpm, normal axis, nonspecific ST/T wave changes but no acute appearing ST elevation.  I reviewed chest x-ray which by my read shows no acute infiltrate/edema on this single AP view.  Reviewed reports from right foot skeletal plain film as well as MRI right foot.        LAB RESULTS:      Lab 09/19/23  1903 09/19/23  1552   WBC  --  8.23   HEMOGLOBIN  --  15.5   HEMATOCRIT  --  44.9   PLATELETS  --  292   NEUTROS ABS  --  7.45*   IMMATURE GRANS (ABS)  --  0.03   LYMPHS ABS  --  0.32*   MONOS ABS  --  0.41   EOS ABS  --  0.01   MCV  --  85.9   LACTATE 3.3* 2.9*         Lab 09/19/23  1552   SODIUM 142   POTASSIUM 3.6   CHLORIDE 93*   CO2 32.0*   ANION GAP 17.0*   BUN 29*   CREATININE 0.83   EGFR 123.0   GLUCOSE 361*   CALCIUM 10.7*   HEMOGLOBIN A1C 7.90*         Lab 09/19/23  1552   TOTAL PROTEIN 8.3   ALBUMIN 5.5*   GLOBULIN 2.8   ALT (SGPT) 15   AST (SGOT) 11   BILIRUBIN 1.0   ALK PHOS 82                     Lab 09/19/23  1549   FIO2 21   CARBOXYHEMOGLOBIN (VENOUS) 1.3     UA          5/7/2023    10:19 8/2/2023    13:00 9/19/2023    17:51   Urinalysis   Squamous Epithelial Cells, UA 0-2  0-2  0-2    Specific Gravity, UA 1.042  1.038  1.045    Ketones, UA 80 mg/dL (3+)  80 mg/dL (3+)  80 mg/dL (3+)    Blood, UA Negative  Negative  Negative    Leukocytes, UA Negative  Negative  Negative    Nitrite, UA Negative  Negative  Negative    RBC, UA 0-2  0-2  0-2    WBC, UA 0-2  3-5  0-2    Bacteria, UA None Seen  None Seen  None Seen        Microbiology Results (last 10 days)       Procedure Component Value - Date/Time    COVID PRE-OP / PRE-PROCEDURE SCREENING ORDER (NO ISOLATION)  - Swab, Nasopharynx [[id number]]  (Normal) Collected: 09/19/23 1559    Lab Status: Final result Specimen: Swab from Nasopharynx Updated: 09/19/23 1708    Narrative:      The following orders were created for panel order COVID PRE-OP / PRE-PROCEDURE SCREENING ORDER (NO ISOLATION) - Swab, Nasopharynx.  Procedure                               Abnormality         Status                     ---------                               -----------         ------                     Respiratory Panel PCR w/...[[id number]]  Normal              Final result                 Please view results for these tests on the individual orders.    Respiratory Panel PCR w/COVID-19(SARS-CoV-2) LIANNE/CASEY/EVERETTE/PAD/COR/MAD/MELANIE In-House, NP Swab in UTM/VTM, 3-4 HR TAT - Swab, Nasopharynx [[id number]]  (Normal) Collected: 09/19/23 1559    Lab Status: Final result Specimen: Swab from Nasopharynx Updated: 09/19/23 1708     ADENOVIRUS, PCR Not Detected     Coronavirus 229E Not Detected     Coronavirus HKU1 Not Detected     Coronavirus NL63 Not Detected     Coronavirus OC43 Not Detected     COVID19 Not Detected     Human Metapneumovirus Not Detected     Human Rhinovirus/Enterovirus Not Detected     Influenza A PCR Not Detected     Influenza B PCR Not Detected     Parainfluenza Virus 1 Not Detected     Parainfluenza Virus 2 Not Detected     Parainfluenza Virus 3 Not Detected     Parainfluenza Virus 4 Not Detected     RSV, PCR Not Detected     Bordetella pertussis pcr Not Detected     Bordetella parapertussis PCR Not Detected     Chlamydophila pneumoniae PCR Not Detected     Mycoplasma pneumo by PCR Not Detected    Narrative:      In the setting of a positive respiratory panel with a viral infection PLUS a negative procalcitonin without other underlying concern for bacterial infection, consider observing off antibiotics or discontinuation of antibiotics and continue supportive care. If the respiratory panel is positive for atypical bacterial infection  (Bordetella pertussis, Chlamydophila pneumoniae, or Mycoplasma pneumoniae), consider antibiotic de-escalation to target atypical bacterial infection.            CT Abdomen Pelvis Without Contrast    Result Date: 9/19/2023  CT ABDOMEN PELVIS WO CONTRAST Date of Exam: 9/19/2023 4:17 PM CDT Indication: nv abd pain. Comparison: 3/9/2023 Technique: Axial CT images were obtained of the abdomen and pelvis without the administration of contrast. Reconstructed coronal and sagittal images were also obtained. Automated exposure control and iterative construction methods were used. Findings: LUNG BASES:  Unremarkable without mass or infiltrate. LIVER:  Unremarkable parenchyma without focal lesion. BILIARY/GALLBLADDER: There is sludge within the gallbladder. There are no gallbladder inflammatory changes. SPLEEN:  Unremarkable PANCREAS:  Unremarkable ADRENAL:  Unremarkable KIDNEYS:  Unremarkable parenchyma with no solid mass identified. No obstruction.  No calculus identified. GASTROINTESTINAL/MESENTERY:  No evidence of obstruction nor inflammation.  The appendix is normal. AORTA/IVC:  Normal caliber. RETROPERITONEUM/LYMPH NODES:  Unremarkable REPRODUCTIVE:  Unremarkable BLADDER:  Unremarkable OSSEUS STRUCTURES:  Typical for age with no acute process identified.     Impression: Impression: 1.No acute process identified. Electronically Signed: Dejan Delgado MD  9/19/2023 4:26 PM CDT  Workstation ID: [id number]    XR Foot 3+ View Right    Result Date: 9/19/2023  XR FOOT 3+ VW RIGHT Date of Exam: 9/19/2023 2:26 PM CDT Indication: right great toe infections. osteo? Comparison: None available. Findings: No evidence of acute fracture nor dislocation. Alignment is normal. Joint space is adequately maintained. No significant effusion identified. Soft tissues are unremarkable. No radiopaque foreign body identified.     Impression: Impression: No acute osseous process identified. No radiographic evidence of osteomyelitis. Electronically  Signed: Dejan Delgado MD  9/19/2023 3:18 PM CDT  Workstation ID: [id number]    XR Chest 1 View    Result Date: 9/19/2023  XR CHEST 1 VW Date of Exam: 9/19/2023 3:26 PM EDT Indication: dka. cough. Comparison: March 9, 2023 Findings: Heart not definitely enlarged. Lungs seem clear. There are no pleural effusions. Visualized osseous structures do not appear unusual.     Impression: Impression: 1.An acute pulmonary process is not apparent. Electronically Signed: Gm Quiroz MD  9/19/2023 4:15 PM EDT  Workstation ID: [id number]    MRI Foot Right Without Contrast    Result Date: 9/19/2023  MRI FOOT RIGHT WO CONTRAST Date of Exam: 9/19/2023 5:50 PM EDT Indication: right great toe swelling. eval for osteo..  Comparison: None available. Technique:  Routine multiplanar/multisequence sequence images of the right foot were obtained without contrast administration. Findings: There is edema within the soft tissues overlying the distal phalanx of the first digit particularly of the lung the nail bed and most distal soft tissues overlying the tuft. There is subtle edema within the tuft of the distal phalanx of the first digit. There is no cortical destruction. No intraosseous fluid collection. No soft tissue fluid collection. Visualized portions of the extent and flexor tendons appear intact.    Impression: Impression: 1. Soft tissue edema overlying the distal phalanx of the first digit with subtle underlying bone edema which is nonspecific and may be reactive. No cortical destruction identified to suggest osteomyelitis at this time. No evidence of soft tissue or intraosseous abscess. Electronically Signed: Fabrizio Dietrich MD  9/19/2023 8:08 PM EDT  Workstation ID: [id number]         Assessment & Plan   Assessment & Plan     Active Hospital Problems    Diagnosis  POA    **Intractable nausea and vomiting [R11.2]  Yes       28M with uncontrolled type 2 diabetes, intractable nausea/emesis, also right great toe small lesion    Intractable  nausea/emesis  - IV Zofran as needed; can use IV Reglan if this is effective.  - AM EKG to measure QT normal.  - Diabetic diet currently ordered, can scale back to clear liquids or n.p.o. if the patient cannot tolerate.  - IVF with 0.9 NS.  - We will also add IV Protonix daily.    Uncontrolled type 2 diabetes  - Levemir 10 units subcu twice daily.  - Add SSI coverage with Accu-Cheks AC/at bedtime.  - IVF with 0.9 NS.  - Still awaiting beta-hydroxybutyrate result.  - Diabetes educator.    Right great toe lesion  - Wound care nurse consult.  - We will start IV antibiotic coverage with vancomycin and ceftriaxone.  - Keep clean and dry.    Occasional alcohol use  Positive THC on UDS  - His tachycardia currently is likely secondary to dehydration.  - Patient states he has not used any marijuana in a month despite the positive result; nausea/emesis may also be related to THC use.  - No other signs of withdrawal at this time; can add CIWA protocol if needed.  - Low-dose as needed Xanax for anxiety.  - We will check EtOH level of blood in lab.      Total time spent: 79 minutes  Time spent includes time reviewing chart, face-to-face time, counseling patient/family/caregiver, ordering medications/tests/procedures, communicating with other health care professionals, documenting clinical information in the electronic health record, and coordination of care.     DVT prophylaxis:  scds      CODE STATUS:  full  Code Status and Medical Interventions:   Ordered at: 09/19/23 2024     Level Of Support Discussed With:    Patient     Code Status (Patient has no pulse and is not breathing):    CPR (Attempt to Resuscitate)     Medical Interventions (Patient has pulse or is breathing):    Full Support       Expected Discharge 9/20    Willi Kay III, DO  09/19/23

## 2023-09-20 NOTE — CASE MANAGEMENT/SOCIAL WORK
Discharge Planning Assessment  [facility]     Patient Name: Ford Mukherjee Jr.  MRN: [id number]  Today's Date: 9/20/2023    Admit Date: 9/19/2023    Plan: IDP   Discharge Needs Assessment       Row Name 09/20/23 0943       Living Environment    People in Home parent(s)    Name(s) of People in Home Ford Mukherjee,. (Father)  [phone] (Mobile)    Current Living Arrangements home    Potentially Unsafe Housing Conditions none    Primary Care Provided by self    Provides Primary Care For no one    Family Caregiver if Needed parent(s)    Family Caregiver Names Ford Mukherjee Sr. (Father)  [phone] (Mobile)    Quality of Family Relationships helpful;involved    Able to Return to Prior Arrangements yes       Resource/Environmental Concerns    Resource/Environmental Concerns none    Transportation Concerns none       Food Insecurity    Within the past 12 months, you worried that your food would run out before you got the money to buy more. Never true    Within the past 12 months, the food you bought just didn't last and you didn't have money to get more. Never true       Transition Planning    Patient/Family Anticipates Transition to home with family    Patient/Family Anticipated Services at Transition none    Transportation Anticipated family or friend will provide;car, drives self       Discharge Needs Assessment    Readmission Within the Last 30 Days no previous admission in last 30 days    Equipment Currently Used at Home bp cuff;glucometer    Concerns to be Addressed denies needs/concerns at this time    Anticipated Changes Related to Illness none    Equipment Needed After Discharge none    Current Discharge Risk substance use/abuse                   Discharge Plan       Row Name 09/20/23 0947       Plan    Plan IDP    Patient/Family in Agreement with Plan yes    Plan Comments CM spoke with the patient at the bedside. He lives in [place] in a house with his parents. He is independent and able to  drive. He has a glucometer and BP cuff at home. He is not current with any home or outpatient services. He confirmed his insurance is [facility] [employer] Medicaid and no PCP. He has asked CM to set up a PCP prior to discharge. SDOH complete. No needs identified. Patient plans for his mother to transport him home at discharge. CM to follow and assist.    Final Discharge Disposition Code 01 - home or self-care                  Continued Care and Services - Admitted Since 9/19/2023    Coordination has not been started for this encounter.          Demographic Summary    No documentation.                  Functional Status       Row Name 09/20/23 0942       Functional Status    Usual Activity Tolerance excellent    Current Activity Tolerance good       Physical Activity    On average, how many days per week do you engage in moderate to strenuous exercise (like a brisk walk)? 0 days    On average, how many minutes do you engage in exercise at this level? 0 min    Number of minutes of exercise per week 0       Assessment of Health Literacy    How often do you have someone help you read hospital materials? Occasionally    How often do you have problems learning about your medical condition because of difficulty understanding written information? Occasionally    How often do you have a problem understanding what is told to you about your medical condition? Occasionally    How confident are you filling out medical forms by yourself? Quite a bit    Health Literacy Good       Functional Status, IADL    Medications independent    Meal Preparation independent    Housekeeping independent    Laundry independent    Shopping independent       Mental Status    General Appearance WDL WDL       Mental Status Summary    Recent Changes in Mental Status/Cognitive Functioning no changes                   Psychosocial    No documentation.                  Abuse/Neglect    No documentation.                  Legal    No documentation.                   Substance Abuse    No documentation.                  Patient Forms    No documentation.                     Mechelle Merino RN

## 2023-09-20 NOTE — NURSING NOTE
[facility] consult for toe.    Patient has right great toe ingrown nail, some surrounding erythema and warmth, no purulence or open wounds or  blistering, no pain, patient has excellent feeling in his feet.. Patient states that he has had this problem before and had to see a podiatrist.   MRI negative for osteo.    MD at bedside, will work to arrange outpatient podiatry appointment.     Painted with betadine-continue daily.     Gave recommendations to patient on keeping clean and dry at home with antibacterial soaps or peroxide and continuing to assess closely.    Will sign off.

## 2023-09-21 LAB
ANION GAP SERPL CALCULATED.3IONS-SCNC: 10 MMOL/L (ref 5–15)
BUN SERPL-MCNC: 9 MG/DL (ref 6–20)
BUN/CREAT SERPL: 18.4 (ref 7–25)
CALCIUM SPEC-SCNC: 9.3 MG/DL (ref 8.6–10.5)
CHLORIDE SERPL-SCNC: 96 MMOL/L (ref 98–107)
CO2 SERPL-SCNC: 29 MMOL/L (ref 22–29)
CREAT SERPL-MCNC: 0.49 MG/DL (ref 0.76–1.27)
EGFRCR SERPLBLD CKD-EPI 2021: 144.2 ML/MIN/1.73
GLUCOSE BLDC GLUCOMTR-MCNC: 140 MG/DL (ref 70–130)
GLUCOSE BLDC GLUCOMTR-MCNC: 142 MG/DL (ref 70–130)
GLUCOSE BLDC GLUCOMTR-MCNC: 182 MG/DL (ref 70–130)
GLUCOSE BLDC GLUCOMTR-MCNC: 205 MG/DL (ref 70–130)
GLUCOSE SERPL-MCNC: 179 MG/DL (ref 65–99)
POTASSIUM SERPL-SCNC: 3.1 MMOL/L (ref 3.5–5.2)
POTASSIUM SERPL-SCNC: 3.4 MMOL/L (ref 3.5–5.2)
POTASSIUM SERPL-SCNC: 3.6 MMOL/L (ref 3.5–5.2)
QT INTERVAL: 376 MS
QTC INTERVAL: 457 MS
SODIUM SERPL-SCNC: 135 MMOL/L (ref 136–145)
TSH SERPL DL<=0.05 MIU/L-ACNC: 1.01 UIU/ML (ref 0.27–4.2)

## 2023-09-21 PROCEDURE — 82948 REAGENT STRIP/BLOOD GLUCOSE: CPT

## 2023-09-21 PROCEDURE — 93005 ELECTROCARDIOGRAM TRACING: CPT | Performed by: FAMILY MEDICINE

## 2023-09-21 PROCEDURE — 63710000001 INSULIN DETEMIR PER 5 UNITS: Performed by: INTERNAL MEDICINE

## 2023-09-21 PROCEDURE — G0378 HOSPITAL OBSERVATION PER HR: HCPCS

## 2023-09-21 PROCEDURE — 63710000001 INSULIN LISPRO (HUMAN) PER 5 UNITS: Performed by: INTERNAL MEDICINE

## 2023-09-21 PROCEDURE — 25010000002 PROCHLORPERAZINE 10 MG/2ML SOLUTION: Performed by: FAMILY MEDICINE

## 2023-09-21 PROCEDURE — 93010 ELECTROCARDIOGRAM REPORT: CPT | Performed by: INTERNAL MEDICINE

## 2023-09-21 PROCEDURE — 99254 IP/OBS CNSLTJ NEW/EST MOD 60: CPT | Performed by: INTERNAL MEDICINE

## 2023-09-21 PROCEDURE — 96361 HYDRATE IV INFUSION ADD-ON: CPT

## 2023-09-21 PROCEDURE — 84443 ASSAY THYROID STIM HORMONE: CPT

## 2023-09-21 PROCEDURE — 25010000002 HYDRALAZINE PER 20 MG: Performed by: FAMILY MEDICINE

## 2023-09-21 PROCEDURE — 80048 BASIC METABOLIC PNL TOTAL CA: CPT

## 2023-09-21 PROCEDURE — 84132 ASSAY OF SERUM POTASSIUM: CPT | Performed by: FAMILY MEDICINE

## 2023-09-21 PROCEDURE — 96376 TX/PRO/DX INJ SAME DRUG ADON: CPT

## 2023-09-21 RX ORDER — POTASSIUM CHLORIDE 20 MEQ/1
40 TABLET, EXTENDED RELEASE ORAL EVERY 4 HOURS
Status: COMPLETED | OUTPATIENT
Start: 2023-09-21 | End: 2023-09-21

## 2023-09-21 RX ORDER — POTASSIUM CHLORIDE 1.5 G/1.58G
40 POWDER, FOR SOLUTION ORAL EVERY 4 HOURS
Status: COMPLETED | OUTPATIENT
Start: 2023-09-21 | End: 2023-09-21

## 2023-09-21 RX ORDER — POTASSIUM CHLORIDE 20 MEQ/1
40 TABLET, EXTENDED RELEASE ORAL EVERY 4 HOURS
Status: DISCONTINUED | OUTPATIENT
Start: 2023-09-21 | End: 2023-09-21 | Stop reason: ALTCHOICE

## 2023-09-21 RX ORDER — AMLODIPINE BESYLATE 10 MG/1
10 TABLET ORAL
Status: DISCONTINUED | OUTPATIENT
Start: 2023-09-21 | End: 2023-09-22 | Stop reason: HOSPADM

## 2023-09-21 RX ORDER — SODIUM CHLORIDE 9 MG/ML
100 INJECTION, SOLUTION INTRAVENOUS CONTINUOUS
Status: DISCONTINUED | OUTPATIENT
Start: 2023-09-21 | End: 2023-09-22 | Stop reason: HOSPADM

## 2023-09-21 RX ADMIN — SODIUM CHLORIDE 1000 ML: 9 INJECTION, SOLUTION INTRAVENOUS at 15:28

## 2023-09-21 RX ADMIN — PANTOPRAZOLE SODIUM 40 MG: 40 INJECTION, POWDER, LYOPHILIZED, FOR SOLUTION INTRAVENOUS at 05:56

## 2023-09-21 RX ADMIN — INSULIN DETEMIR 10 UNITS: 100 INJECTION, SOLUTION SUBCUTANEOUS at 21:06

## 2023-09-21 RX ADMIN — CLINDAMYCIN HYDROCHLORIDE 300 MG: 150 CAPSULE ORAL at 11:58

## 2023-09-21 RX ADMIN — METOPROLOL TARTRATE 25 MG: 25 TABLET, FILM COATED ORAL at 10:41

## 2023-09-21 RX ADMIN — LISINOPRIL 10 MG: 10 TABLET ORAL at 08:19

## 2023-09-21 RX ADMIN — INSULIN DETEMIR 10 UNITS: 100 INJECTION, SOLUTION SUBCUTANEOUS at 08:19

## 2023-09-21 RX ADMIN — HYDRALAZINE HYDROCHLORIDE 10 MG: 20 INJECTION INTRAMUSCULAR; INTRAVENOUS at 05:56

## 2023-09-21 RX ADMIN — POTASSIUM CHLORIDE 40 MEQ: 1.5 POWDER, FOR SOLUTION ORAL at 18:26

## 2023-09-21 RX ADMIN — POTASSIUM CHLORIDE 40 MEQ: 1500 TABLET, EXTENDED RELEASE ORAL at 05:57

## 2023-09-21 RX ADMIN — Medication 10 ML: at 21:11

## 2023-09-21 RX ADMIN — INSULIN LISPRO 2 UNITS: 100 INJECTION, SOLUTION INTRAVENOUS; SUBCUTANEOUS at 08:19

## 2023-09-21 RX ADMIN — CLINDAMYCIN HYDROCHLORIDE 300 MG: 150 CAPSULE ORAL at 16:52

## 2023-09-21 RX ADMIN — AMLODIPINE BESYLATE 10 MG: 10 TABLET ORAL at 08:19

## 2023-09-21 RX ADMIN — SODIUM CHLORIDE 100 ML/HR: 9 INJECTION, SOLUTION INTRAVENOUS at 21:08

## 2023-09-21 RX ADMIN — INSULIN LISPRO 3 UNITS: 100 INJECTION, SOLUTION INTRAVENOUS; SUBCUTANEOUS at 11:58

## 2023-09-21 RX ADMIN — SODIUM CHLORIDE 100 ML/HR: 9 INJECTION, SOLUTION INTRAVENOUS at 16:58

## 2023-09-21 RX ADMIN — CLINDAMYCIN HYDROCHLORIDE 300 MG: 150 CAPSULE ORAL at 05:56

## 2023-09-21 RX ADMIN — PROCHLORPERAZINE EDISYLATE 5 MG: 5 INJECTION INTRAMUSCULAR; INTRAVENOUS at 02:14

## 2023-09-21 RX ADMIN — SODIUM CHLORIDE 500 ML: 9 INJECTION, SOLUTION INTRAVENOUS at 16:58

## 2023-09-21 RX ADMIN — POTASSIUM CHLORIDE 40 MEQ: 1500 TABLET, EXTENDED RELEASE ORAL at 02:14

## 2023-09-21 RX ADMIN — POTASSIUM CHLORIDE 40 MEQ: 1.5 POWDER, FOR SOLUTION ORAL at 21:06

## 2023-09-21 NOTE — CONSULTS
[facility] at [facility]  CARDIOLOGY CONSULTATION NOTE  Ford Mukherjee Jr.  [id number]  1995   LOS: 2 days   Patient Care Team:  Shalini Goodson APRN as PCP - General (Family Medicine)  Rich Peralta MD (Family Medicine)    Reason for Consultation: Tachycardia    Problem List:   Diabetes- non-compliant  Marijuana use       History of Present Illness:      Ford Mukherjee Jr. is a 28 y.o. male with a past medical history significant for diabetes and marijuana use presented to ED on 9/19/23 with intractable nausea, vomiting and uncontrolled DM.  On arrival to the ED, blood sugar was in the 300s.  Positive for THC although he says he has not had it in at least a month.  EKG on 9/19/2023 shows tachycardia rates 140.  Patient currently sitting up in bed hoping he can be discharged.  Patient states that he was out of town on Saturday and did not take his diabetes medication when he got home he decided to go out and drink alcohol instead of resting.  He states that the tachycardia has happened to him before and that when he gets home and able to eat normal food it will resolve.  He states he is able to keep food and liquids down now.  He has had similar episodes before but has never been diagnosed with gastroparesis or neuropathy.  IV fluids were discontinued this morning.  He denies chest pain, shortness of breath, palpitations, lightheadedness, dizziness, edema, and syncope.     No Known Allergies    Past Medical History:   Diagnosis Date    Diabetes mellitus     PT DENIES THIS DX, PREVIOUS DOCUMENTED    GI (gastrointestinal bleed)       History reviewed. No pertinent surgical history.   Social History     Socioeconomic History    Marital status: Single   Tobacco Use    Smoking status: Former    Smokeless tobacco: Never   Vaping Use    Vaping Use: Never used   Substance and Sexual Activity    Alcohol use: Yes     Comment: SOCIALLY    Drug use: No    Sexual activity:  Yes     Partners: Female     Family History   Problem Relation Age of Onset    Diabetes Mother     No Known Problems Father     Colon cancer Neg Hx     Esophageal cancer Neg Hx     Inflammatory bowel disease Neg Hx     Irritable bowel syndrome Neg Hx     Ulcerative colitis Neg Hx        Medications Prior to Admission   Medication Sig Dispense Refill Last Dose    Alcohol Swabs pads 1 swab Daily. 100 each 5 Unknown    Blood Glucose Monitoring Suppl w/Device kit Check BG one time per day. 1 each 0 Unknown    glucose blood test strip Check BG one time per day. 100 each 0 Unknown    hyoscyamine (LEVSIN) 0.125 MG SL tablet Take 1 tablet by mouth Every 4 (Four) Hours As Needed for Cramping. 20 tablet 0     insulin detemir (LEVEMIR) 100 UNIT/ML injection Inject 20 Units under the skin into the appropriate area as directed Every Night. 600 mL 0 Unknown    Insulin Pen Needle (B-D UF III MINI PEN NEEDLES) 31G X 5 MM misc 1 pen Every Night. 30 each 0 Unknown    Lancets (freestyle) lancets Check BG once per day. 100 each 0 Unknown    lisinopril (PRINIVIL,ZESTRIL) 10 MG tablet Take 1 tablet by mouth Daily. 30 tablet 0 Unknown    metFORMIN (GLUCOPHAGE) 1000 MG tablet Take 1 tablet by mouth 2 (Two) Times a Day With Meals. 60 tablet 5 Unknown    ondansetron ODT (ZOFRAN-ODT) 8 MG disintegrating tablet Place 1 tablet on the tongue Every 8 (Eight) Hours As Needed for Nausea or Vomiting. 15 tablet 0      Scheduled Meds:GI cocktail, , Oral, Once  amLODIPine, 10 mg, Oral, Q24H  clindamycin, 300 mg, Oral, Q6H  insulin detemir, 10 Units, Subcutaneous, Q12H  insulin lispro, 2-7 Units, Subcutaneous, 4x Daily AC & at Bedtime  lisinopril, 10 mg, Oral, Daily  metoprolol tartrate, 25 mg, Oral, Q12H  pantoprazole, 40 mg, Intravenous, Q AM  sodium chloride, 10 mL, Intravenous, Q12H      Continuous Infusions:   PRN Meds:.  acetaminophen    Calcium Replacement - Follow Nurse / BPA Driven Protocol    dextrose    dextrose    glucagon (human  "recombinant)    hydrALAZINE    HYDROcodone-acetaminophen    hyoscyamine    Magnesium Standard Dose Replacement - Follow Nurse / BPA Driven Protocol    melatonin    metoclopramide    metoprolol tartrate    Morphine **AND** naloxone    nitroglycerin    ondansetron    Phosphorus Replacement - Follow Nurse / BPA Driven Protocol    Potassium Replacement - Follow Nurse / BPA Driven Protocol    prochlorperazine **OR** prochlorperazine **OR** prochlorperazine    sodium chloride    [COMPLETED] Insert Peripheral IV **AND** sodium chloride    sodium chloride    sodium chloride    Review of Systems  All systems have been reviewed and are negative with the exception of those mentioned in the HPI and problem list above.     Objective:       Physical Exam  /98 (BP Location: Left arm, Patient Position: Sitting)   Pulse (!) 135   Temp 98.7 °F (37.1 °C) (Oral)   Resp 18   Ht 180.3 cm (71\")   Wt 68 kg (150 lb)   SpO2 93%   BMI 20.92 kg/m²       09/19/23  1458   Weight: 68 kg (150 lb)     Body mass index is 20.92 kg/m².    Intake/Output Summary (Last 24 hours) at 9/21/2023 1447  Last data filed at 9/20/2023 1835  Gross per 24 hour   Intake 1038 ml   Output --   Net 1038 ml       Physical Exam  General Appearance:  Alert, cooperative, anxious, appears stated age   Neck: Supple, symmetrical, trachea midline, no JVD   Lungs:   Clear to auscultation bilaterally, respirations unlabored   Heart:  Tachycardia, regular, S1, S2 normal, no murmur, rub or gallop   Extremities: No edema, normal range of motion   Pulses: 2+ and symmetric   Skin: Skin color, texture, turgor normal, no rashes or lesions   Neurologic: Anxious     Cardiographics  EKG: Sinus tachycardia     Telemetry reviewed which shows sinus tachycardia with variable rates.  No evidence of sustained arrhythmias.  PVCs on monitor during exam.    Imaging  Chest x-ray: CT Abdomen Pelvis Without Contrast    Result Date: 9/19/2023  Impression: 1.No acute process identified. " Electronically Signed: Dejan Delgado MD  9/19/2023 4:26 PM CDT  Workstation ID: [id number]    XR Foot 3+ View Right    Result Date: 9/19/2023  Impression: No acute osseous process identified. No radiographic evidence of osteomyelitis. Electronically Signed: Dejan Delgado MD  9/19/2023 3:18 PM CDT  Workstation ID: [id number]    XR Chest 1 View    Result Date: 9/19/2023  Impression: 1.An acute pulmonary process is not apparent. Electronically Signed: Gm Quiroz MD  9/19/2023 4:15 PM EDT  Workstation ID: [id number]    MRI Foot Right Without Contrast    Result Date: 9/19/2023  Impression: 1. Soft tissue edema overlying the distal phalanx of the first digit with subtle underlying bone edema which is nonspecific and may be reactive. No cortical destruction identified to suggest osteomyelitis at this time. No evidence of soft tissue or intraosseous abscess. Electronically Signed: Fabrizio Dietrich MD  9/19/2023 8:08 PM EDT  Workstation ID: [id number]      Lab Review   Results from last 7 days   Lab Units 09/21/23  1112 09/21/23  0400 09/20/23  2355 09/20/23  0821 09/19/23  1552   SODIUM mmol/L  --  135*  --  138 142   POTASSIUM mmol/L 3.6 3.1* 3.4* 3.5 3.6   CHLORIDE mmol/L  --  96*  --  99 93*   CO2 mmol/L  --  29.0  --  26.0 32.0*   BUN mg/dL  --  9  --  15 29*   CREATININE mg/dL  --  0.49*  --  0.57* 0.83   GLUCOSE mg/dL  --  179*  --  230* 361*   CALCIUM mg/dL  --  9.3  --  9.4 10.7*     Results from last 7 days   Lab Units 09/20/23  0821 09/19/23  1552   WBC 10*3/mm3 9.14 8.23   HEMOGLOBIN g/dL 14.0 15.5   HEMATOCRIT % 40.2 44.9   PLATELETS 10*3/mm3 256 292         Results from last 7 days   Lab Units 09/19/23  1552   HEMOGLOBIN A1C % 7.90*     Results from last 7 days   Lab Units 09/19/23  1552   CK TOTAL U/L 29          Intractable nausea and vomiting     Assessment & Plan      Assessment:     Sinus Tachycardia  Started on Metoprolol 25 mg this morning with no significant improvement  Point-of-care echo performed  -normal LV  and RV function, IVC flat  Heart rate improved with patient laying flat with HR around 100, rate increased 130s-140s when sitting up on side of bed  With his presentation of hyperglycemia with ketosis suspect that he would benefit from additional volume resuscitation prior to treatment of any sinus tachycardia  Diabetes type 2-chronically uncontrolled with a hemoglobin A1c 7.9  Awaiting C-peptide levels     Plan:     Give 1L NS IV fluid bolus and see how heart rate responds.   TSH added on  Discontinue metoprolol with appropriate sinus tachycardia    Scribed for Joseluis Hagen MD by IKE Neal, 09/21/23, 2:16 PM EDT.     I personally performed the services described in this documentation as scribed by the above named individual in my presence, and it is both accurate and complete.    KIMBERLY Hagen MD  09/21/23  15:34 EDT         Please note that portions of this note were dictated utilizing Dragon dictation.

## 2023-09-21 NOTE — PROGRESS NOTES
[facility] Medicine Services  PROGRESS NOTE    Patient Name: Ford Mukherjee Jr.  : 1995  MRN: [id number]    Date of Admission: 2023  Primary Care Physician: Shalini Goodson APRN    Subjective   Subjective     CC:  Intractable nausea and vomiting    HPI:  Patient is a 28-year-old male seen and examined by me again this a.m.  His glucose has been well controlled and he has not had any recurrent nausea or vomiting.  Most of his problems this a.m. are related to hypertension and tachycardia.  New blood pressure medications have been started and patient reexamined this afternoon.  Patient's blood pressure much improved however still having issues with tachycardia, cardiology will be consulted for further evaluation however patient may require continued observation at this time. Patient's mother is at bedside and has been updated.         Objective   Objective     Vital Signs:   Temp:  [98.6 °F (37 °C)-98.7 °F (37.1 °C)] 98.7 °F (37.1 °C)  Heart Rate:  [] 135  Resp:  [18] 18  BP: (123-178)/() 130/98     Physical Exam:  Constitutional: No acute distress, awake, alert and oriented x 3   HENT: NCAT, nares patent, mucous membranes moist  Respiratory: Clear to auscultation bilaterally, respiratory effort normal   Cardiovascular: Sinus tachycardia, no murmurs, rubs, or gallops  Gastrointestinal: Positive bowel sounds, soft, nontender, nondistended  Musculoskeletal: No bilateral ankle edema, R great toe appears ingrown, some mild erythema surround nail, no drainage   Psychiatric: Appropriate affect, cooperative  Neurologic: Oriented x 3, strength symmetric in all extremities, Cranial Nerves grossly intact to confrontation, speech clear  Skin: No rashes      Results Reviewed:  LAB RESULTS:      Lab 23  0821 23  2249 23  2127 23  1903 23  1552   WBC 9.14  --   --   --  8.23   HEMOGLOBIN 14.0  --   --   --  15.5   HEMATOCRIT 40.2  --    --   --  44.9   PLATELETS 256  --   --   --  292   NEUTROS ABS 7.29*  --   --   --  7.45*   IMMATURE GRANS (ABS) 0.03  --   --   --  0.03   LYMPHS ABS 1.01  --   --   --  0.32*   MONOS ABS 0.80  --   --   --  0.41   EOS ABS 0.00  --   --   --  0.01   MCV 86.6  --   --   --  85.9   LACTATE  --  1.8 2.2* 3.3* 2.9*         Lab 09/21/23  1112 09/21/23  0400 09/20/23  2355 09/20/23  0821 09/19/23  1552   SODIUM  --  135*  --  138 142   POTASSIUM 3.6 3.1* 3.4* 3.5 3.6   CHLORIDE  --  96*  --  99 93*   CO2  --  29.0  --  26.0 32.0*   ANION GAP  --  10.0  --  13.0 17.0*   BUN  --  9  --  15 29*   CREATININE  --  0.49*  --  0.57* 0.83   EGFR  --  144.2  --  137.8 123.0   GLUCOSE  --  179*  --  230* 361*   CALCIUM  --  9.3  --  9.4 10.7*   MAGNESIUM  --   --   --  1.9  --    HEMOGLOBIN A1C  --   --   --   --  7.90*   TSH 1.010  --   --   --   --          Lab 09/20/23  0821 09/19/23  1552   TOTAL PROTEIN 7.2 8.3   ALBUMIN 4.6 5.5*   GLOBULIN 2.6 2.8   ALT (SGPT) 11 15   AST (SGOT) 9 11   BILIRUBIN 1.1 1.0   ALK PHOS 63 82                     Lab 09/19/23  1549   FIO2 21   CARBOXYHEMOGLOBIN (VENOUS) 1.3     Brief Urine Lab Results  (Last result in the past 365 days)        Color   Clarity   Blood   Leuk Est   Nitrite   Protein   CREAT   Urine HCG        09/19/23 1751 Yellow   Clear   Negative   Negative   Negative   >=300 mg/dL (3+)                   Microbiology Results Abnormal       Procedure Component Value - Date/Time    Blood Culture - Blood, Wrist, Right [[id number]]  (Normal) Collected: 09/19/23 2128    Lab Status: Preliminary result Specimen: Blood from Wrist, Right Updated: 09/20/23 2215     Blood Culture No growth at 24 hours    Narrative:      Aerobic bottle only    Blood Culture - Blood, Arm, Left [[id number]]  (Normal) Collected: 09/19/23 2124    Lab Status: Preliminary result Specimen: Blood from Arm, Left Updated: 09/20/23 2215     Blood Culture No growth at 24 hours    COVID PRE-OP / PRE-PROCEDURE SCREENING ORDER  (NO ISOLATION) - Swab, Nasopharynx [[id number]]  (Normal) Collected: 09/19/23 1559    Lab Status: Final result Specimen: Swab from Nasopharynx Updated: 09/19/23 1708    Narrative:      The following orders were created for panel order COVID PRE-OP / PRE-PROCEDURE SCREENING ORDER (NO ISOLATION) - Swab, Nasopharynx.  Procedure                               Abnormality         Status                     ---------                               -----------         ------                     Respiratory Panel PCR w/...[[id number]]  Normal              Final result                 Please view results for these tests on the individual orders.    Respiratory Panel PCR w/COVID-19(SARS-CoV-2) LIANNE/CASEY/EVERETTE/PAD/COR/MAD/MELANIE In-House, NP Swab in UTM/VTM, 3-4 HR TAT - Swab, Nasopharynx [[id number]]  (Normal) Collected: 09/19/23 1559    Lab Status: Final result Specimen: Swab from Nasopharynx Updated: 09/19/23 1708     ADENOVIRUS, PCR Not Detected     Coronavirus 229E Not Detected     Coronavirus HKU1 Not Detected     Coronavirus NL63 Not Detected     Coronavirus OC43 Not Detected     COVID19 Not Detected     Human Metapneumovirus Not Detected     Human Rhinovirus/Enterovirus Not Detected     Influenza A PCR Not Detected     Influenza B PCR Not Detected     Parainfluenza Virus 1 Not Detected     Parainfluenza Virus 2 Not Detected     Parainfluenza Virus 3 Not Detected     Parainfluenza Virus 4 Not Detected     RSV, PCR Not Detected     Bordetella pertussis pcr Not Detected     Bordetella parapertussis PCR Not Detected     Chlamydophila pneumoniae PCR Not Detected     Mycoplasma pneumo by PCR Not Detected    Narrative:      In the setting of a positive respiratory panel with a viral infection PLUS a negative procalcitonin without other underlying concern for bacterial infection, consider observing off antibiotics or discontinuation of antibiotics and continue supportive care. If the respiratory panel is positive for atypical bacterial  infection (Bordetella pertussis, Chlamydophila pneumoniae, or Mycoplasma pneumoniae), consider antibiotic de-escalation to target atypical bacterial infection.            CT Abdomen Pelvis Without Contrast    Result Date: 9/19/2023  CT ABDOMEN PELVIS WO CONTRAST Date of Exam: 9/19/2023 4:17 PM CDT Indication: nv abd pain. Comparison: 3/9/2023 Technique: Axial CT images were obtained of the abdomen and pelvis without the administration of contrast. Reconstructed coronal and sagittal images were also obtained. Automated exposure control and iterative construction methods were used. Findings: LUNG BASES:  Unremarkable without mass or infiltrate. LIVER:  Unremarkable parenchyma without focal lesion. BILIARY/GALLBLADDER: There is sludge within the gallbladder. There are no gallbladder inflammatory changes. SPLEEN:  Unremarkable PANCREAS:  Unremarkable ADRENAL:  Unremarkable KIDNEYS:  Unremarkable parenchyma with no solid mass identified. No obstruction.  No calculus identified. GASTROINTESTINAL/MESENTERY:  No evidence of obstruction nor inflammation.  The appendix is normal. AORTA/IVC:  Normal caliber. RETROPERITONEUM/LYMPH NODES:  Unremarkable REPRODUCTIVE:  Unremarkable BLADDER:  Unremarkable OSSEUS STRUCTURES:  Typical for age with no acute process identified.     Impression: Impression: 1.No acute process identified. Electronically Signed: Dejan Delgado MD  9/19/2023 4:26 PM CDT  Workstation ID: [id number]    XR Foot 3+ View Right    Result Date: 9/19/2023  XR FOOT 3+ VW RIGHT Date of Exam: 9/19/2023 2:26 PM CDT Indication: right great toe infections. osteo? Comparison: None available. Findings: No evidence of acute fracture nor dislocation. Alignment is normal. Joint space is adequately maintained. No significant effusion identified. Soft tissues are unremarkable. No radiopaque foreign body identified.     Impression: Impression: No acute osseous process identified. No radiographic evidence of osteomyelitis.  Electronically Signed: Dejan Delgado MD  9/19/2023 3:18 PM CDT  Workstation ID: [id number]    XR Chest 1 View    Result Date: 9/19/2023  XR CHEST 1 VW Date of Exam: 9/19/2023 3:26 PM EDT Indication: dka. cough. Comparison: March 9, 2023 Findings: Heart not definitely enlarged. Lungs seem clear. There are no pleural effusions. Visualized osseous structures do not appear unusual.     Impression: Impression: 1.An acute pulmonary process is not apparent. Electronically Signed: Gm Quiroz MD  9/19/2023 4:15 PM EDT  Workstation ID: [id number]    MRI Foot Right Without Contrast    Result Date: 9/19/2023  MRI FOOT RIGHT WO CONTRAST Date of Exam: 9/19/2023 5:50 PM EDT Indication: right great toe swelling. eval for osteo..  Comparison: None available. Technique:  Routine multiplanar/multisequence sequence images of the right foot were obtained without contrast administration. Findings: There is edema within the soft tissues overlying the distal phalanx of the first digit particularly of the lung the nail bed and most distal soft tissues overlying the tuft. There is subtle edema within the tuft of the distal phalanx of the first digit. There is no cortical destruction. No intraosseous fluid collection. No soft tissue fluid collection. Visualized portions of the extent and flexor tendons appear intact.    Impression: Impression: 1. Soft tissue edema overlying the distal phalanx of the first digit with subtle underlying bone edema which is nonspecific and may be reactive. No cortical destruction identified to suggest osteomyelitis at this time. No evidence of soft tissue or intraosseous abscess. Electronically Signed: Fabrizio Dietrich MD  9/19/2023 8:08 PM EDT  Workstation ID: [id number]         Current medications:  Scheduled Meds:GI cocktail, , Oral, Once  amLODIPine, 10 mg, Oral, Q24H  clindamycin, 300 mg, Oral, Q6H  insulin detemir, 10 Units, Subcutaneous, Q12H  insulin lispro, 2-7 Units, Subcutaneous, 4x Daily AC & at  Bedtime  lisinopril, 10 mg, Oral, Daily  pantoprazole, 40 mg, Intravenous, Q AM  potassium chloride ER, 40 mEq, Oral, Q4H  sodium chloride, 1,000 mL, Intravenous, Once  sodium chloride, 10 mL, Intravenous, Q12H      Continuous Infusions:     PRN Meds:.  acetaminophen    Calcium Replacement - Follow Nurse / BPA Driven Protocol    dextrose    dextrose    glucagon (human recombinant)    hydrALAZINE    HYDROcodone-acetaminophen    hyoscyamine    Magnesium Standard Dose Replacement - Follow Nurse / BPA Driven Protocol    melatonin    metoclopramide    metoprolol tartrate    Morphine **AND** naloxone    nitroglycerin    ondansetron    Phosphorus Replacement - Follow Nurse / BPA Driven Protocol    Potassium Replacement - Follow Nurse / BPA Driven Protocol    prochlorperazine **OR** prochlorperazine **OR** prochlorperazine    sodium chloride    [COMPLETED] Insert Peripheral IV **AND** sodium chloride    sodium chloride    sodium chloride    Assessment & Plan   Assessment & Plan     Active Hospital Problems    Diagnosis  POA    **Intractable nausea and vomiting [R11.2]  Yes      Resolved Hospital Problems   No resolved problems to display.        Brief Hospital Course to date:  28M with uncontrolled type 2 diabetes, intractable nausea/emesis, also likely R ingrown toenail with surrounding erythema, plans are for outpatient podiatry follow up. Patient reports longstanding history of Type II DM diagnosis, does not have expected habitus or presentation of Type II DM, will plan to order C-peptide level on patient. Continued supportive care at this time, patient does not regularly take medication outpatient or have PCP, CM working on establishing with new PCP.  Patient has had issues with both hypertension and tachycardia overnight, blood pressure much improved on 9/21 in the afternoon but having continued issues with tachycardia much worse with exertion. Cardiology consulted for evaluation and discussed with Dr. Hagen, patient  to receive additional IVF bolus and will continue to monitor at this time. Patient may require additional observation at this time.      Intractable nausea/emesis  - IV Zofran as needed; can use IV Reglan if this is effective.  - Diabetic diet currently ordered, can scale back to clear liquids or n.p.o. if the patient cannot tolerate.  - IVF with 0.9 NS.  - We will also add IV Protonix daily.  - Much improved per patient.      Uncontrolled type 2 diabetes  - Levemir 10 units subcu twice daily.  -- A1C 7.9   - Add SSI coverage with Accu-Cheks AC/at bedtime.  - IVF with 0.9 NS.  - Diabetes educator.  - Obtain C-peptide level, patient confirms type II diagnosis but is also noncompliant with outpatient medical care. Still pending at this time.      Right great toe lesion  - Wound care nurse consult.  - Likely ingrown toenail, outpatient podiatry follow up, will transition to PO Clindamycin   - Keep clean and dry.     Occasional alcohol use  Positive THC on UDS  - His tachycardia currently is likely secondary to dehydration.  - Patient states he has not used any marijuana in a month despite the positive result; nausea/emesis may also be related to THC use.  - No other signs of withdrawal at this time; can add CIWA protocol if needed.  - Low-dose as needed Xanax for anxiety.  - EtOH negative     Tachycardia   - Persistent despite multiple medications and started on Metoprolol this AM   - Consulted cardiology for evaluation this afternoon, Dr. Hagen saw patient and did bedside echo, IVC collapse so plans for additional IVF bolus and will order continued IVF, may require continued observation but will continue to monitor.     Hypertension    - Patient restarted on Lisinopril, also required multiple PRNs   - Started on Norvasc 10 mg daily and much improved      DVT prophylaxis:  scds    Expected Discharge Location and Transportation: Home   Expected Discharge 9/22  Expected Discharge Date: 9/22/2023; Expected Discharge Time:       DVT prophylaxis:  Mechanical DVT prophylaxis orders are present.          CODE STATUS:   Code Status and Medical Interventions:   Ordered at: 09/19/23 2024     Level Of Support Discussed With:    Patient     Code Status (Patient has no pulse and is not breathing):    CPR (Attempt to Resuscitate)     Medical Interventions (Patient has pulse or is breathing):    Full Support       YAZ Prescott, DO  09/21/23

## 2023-09-21 NOTE — CASE MANAGEMENT/SOCIAL WORK
Continued Stay Note  [facility]     Patient Name: Ford Mukherjee Jr.  MRN: [id number]  Today's Date: 9/21/2023    Admit Date: 9/19/2023    Plan: home   Discharge Plan       Row Name 09/21/23 1105       Plan    Plan home    Plan Comments CM contacted the patient's PCP listed in [facility] to verify that he is a patient with her. The office told me that he was last seen there in October of 2022 and he can come in for a post hospital follow up with them. CM to make that appointment and put details of appointment in [facility]. CM to follow and assist.    Final Discharge Disposition Code 01 - home or self-care                   Discharge Codes    No documentation.                 Expected Discharge Date and Time       Expected Discharge Date Expected Discharge Time    Sep 22, 2023               Mechelle Merino RN

## 2023-09-21 NOTE — PLAN OF CARE
Problem: Adult Inpatient Plan of Care  Goal: Plan of Care Review  Outcome: Ongoing, Progressing  Flowsheets (Taken 9/21/2023 0532)  Progress: no change  Goal: Patient-Specific Goal (Individualized)  Outcome: Ongoing, Progressing  Goal: Absence of Hospital-Acquired Illness or Injury  Outcome: Ongoing, Progressing  Intervention: Identify and Manage Fall Risk  Recent Flowsheet Documentation  Taken 9/21/2023 0400 by Johnny Chan RN  Safety Promotion/Fall Prevention:   safety round/check completed   room organization consistent   nonskid shoes/slippers when out of bed   assistive device/personal items within reach   clutter free environment maintained  Taken 9/21/2023 0200 by Johnny Chan RN  Safety Promotion/Fall Prevention:   safety round/check completed   room organization consistent   nonskid shoes/slippers when out of bed   clutter free environment maintained   assistive device/personal items within reach  Taken 9/21/2023 0000 by Johnny Chan RN  Safety Promotion/Fall Prevention:   safety round/check completed   room organization consistent   nonskid shoes/slippers when out of bed   assistive device/personal items within reach   clutter free environment maintained   fall prevention program maintained  Taken 9/20/2023 2200 by Johnny Chan RN  Safety Promotion/Fall Prevention:   safety round/check completed   room organization consistent   nonskid shoes/slippers when out of bed   assistive device/personal items within reach   clutter free environment maintained  Taken 9/20/2023 2000 by Johnny Chan, RN  Safety Promotion/Fall Prevention:   safety round/check completed   room organization consistent   nonskid shoes/slippers when out of bed   assistive device/personal items within reach   clutter free environment maintained  Intervention: Prevent Skin Injury  Recent Flowsheet Documentation  Taken 9/21/2023 0400 by Johnny Chan RN  Body Position: position changed independently  Taken 9/21/2023 0200 by Rocio  MIYA Turner  Body Position: position changed independently  Taken 9/20/2023 2200 by Johnny Chan RN  Body Position: position changed independently  Taken 9/20/2023 2000 by Johnny Chan RN  Body Position: position changed independently  Skin Protection:   adhesive use limited   tubing/devices free from skin contact  Intervention: Prevent and Manage VTE (Venous Thromboembolism) Risk  Recent Flowsheet Documentation  Taken 9/21/2023 0400 by Johnny Chan RN  Activity Management: up ad fletcher  Taken 9/21/2023 0200 by Johnny Chan RN  Activity Management: up ad fletcher  Taken 9/21/2023 0000 by Johnny Chan RN  Activity Management: up ad fletcher  Taken 9/20/2023 2200 by Johnny Chan RN  Activity Management: up ad fletcher  Taken 9/20/2023 2000 by Johnny Chan RN  Activity Management: up ad fletcher  Goal: Optimal Comfort and Wellbeing  Outcome: Ongoing, Progressing  Goal: Readiness for Transition of Care  Outcome: Ongoing, Progressing   Goal Outcome Evaluation:           Progress: no change

## 2023-09-21 NOTE — PROGRESS NOTES
"                    Clinical Nutrition     Patient Name: Ford Mukherjee Jr.  YOB: 1995  MRN: [id number]  Date of Encounter: 09/21/23 11:59 EDT  Admission date: 9/19/2023    Comments:       Reason for Visit   Identified at risk by screening criteria    Medical conditions   Admission Diagnosis:  Intractable nausea and vomiting [R11.2]    Problem List:    Intractable nausea and vomiting      Anthropometric      Flowsheet Rows      Flowsheet Row First Filed Value   Admission Height 180.3 cm (71\") Documented at 09/19/2023 1458   Admission Weight 68 kg (150 lb) Documented at 09/19/2023 1458     Height: 180.3 cm (71\")  Last Filed Weight: Weight: 68 kg (150 lb) (09/19/23 1458)  Weight Method: Stated  BMI: BMI (Calculated): 20.9  BMI classification: Normal: 18.5-24.9kg/m2   IBW:  172lb    UBW:  156lb    Weight change:  patient reports not recent weight changes to report.      Nutrition Focused Physical Exam     Date:  9/21      Unable to perform exam due to: Defer pending indication       Reported/Observed/Food/Nutrition Related - Comments   9/21  Patient reports overall good appetite.  Reports he was on a road trip and did not eat well which resulted in some stomach pain.  He is not concerned about his right great toe lesion- plans to follow up with podiatrist.  Patient with order for Boost and Premier protein, reports he does not like the supplement, would like for RD to D/C.      Patient reports he has been taking his medication regularly to keep BG controlled.  Reports history of A1C ~ 14 (some time this year) due to not taking his medication.  Current A1C 7.9 indicating much better control and compliance with medication. DM education offered, patient declined.       Current Nutrition Prescription   Diet: Diabetic Diets; Consistent Carbohydrate; Texture: Regular Texture (IDDSI 7); Fluid Consistency: Thin (IDDSI 0)  No active supplement orders  Supplement: Boost G.C. BID; premier protein daily. "       Average Intake from Charting: Insufficient data     Nutrition Diagnosis   Date: 9/21 Updated:     Problem No nutrition diagnosis at this time   Etiology    Signs/Symptoms    Status:      Actions     Follow treatment progress, Care plan reviewed, Interview for preferences, Encourage intake, Supplement offered/refused    Monitor Per Protocol      Gin Brooks RD,   Time Spent: 25min

## 2023-09-21 NOTE — CONSULTS
"Diabetes Education  Assessment/Teaching    Patient Name:  Ford Mukherjee Jr.  YOB: 1995  MRN: [id number]  Admit Date:  9/19/2023      Assessment Date:  9/21/2023  Flowsheet Row Most Recent Value   General Information     Referral From: A1c  [>7]   Height 180.3 cm (71\")   Height Method Stated   Weight 68 kg (150 lb)   Weight Method Stated   Is patient pregnant? n/a   Pregnancy Assessment    Diabetes History    Do you test your blood sugar at home? yes   Frequency of checks \"sometimes I check it, every once in a while\"   Typical readings \"130\"   What makes it difficult for you to take care of your diabetes or yourself? remembering to take insulin   Education Preferences    Nutrition Information    Assessment Topics    Healthy Eating - Assessment Needs education   Being Active - Assessment Competent   Taking Medication - Assessment Needs education   Problem Solving - Assessment Needs education   Reducing Risk - Assessment Needs education   Healthy Coping - Assessment Needs education   Monitoring - Assessment Needs education   DM Goals    Contact Plan Follow-up medical care            Flowsheet Row Most Recent Value   DM Education Needs    Meter Has own   Frequency of Testing Weekly   Medication Insulin   Problem Solving Hypoglycemia, Hyperglycemia, Signs, Symptoms, Treatment   Reducing Risks A1C testing   Healthy Coping Depressed   Motivation Not interested   Teaching Method Explanation, Discussion, Handouts   Patient Response Needs reinforcement              Other Comments:  Total time spent reviewing chart, preparing education/materials, providing education at bedside, and coordinating care approx 25 minutes.    Consult for diabetes education received for A1c >7. Chart reviewed. Pt was seen at bedside today. Permission given for visit. Also present is pts mother.    Mr. Mukherjee states he does not feel well today and was napping when I came in, but is willing to speak some about diabetes self-mgmt " "at home. He states he SMBG \"sometimes\" and normally it is \"130\". States last checked two days ago.    Reports he was initially diagnosed a couple of years ago and saw a \"natural specialist\" at that time. We talked about the different types of diabetes and how there is a difference between type 1 and type 2 diabetes. Discussed his current A1c of 7.9. Noted this is a decrease from previous A1c of 11.7 in March 2023, 10.1 in May 2023.     Pt states he is prescribed levemir insulin, takes \"10-15 units\". Does not currently take metformon. States he needs refill for his levemir because he does not have any at home. Cites barrier to taking consistently as difficulty remembering, but states he plans to take it more regularly now. Encouraged to engage support system to help him with diabetes mgmt. States his mother and father do the meal preparation for him, he typically eats 3 meals per day.    Discussed importance of follow-up with providers, adhering to medication regimen, and why taking insulin as presribed is so important. Encouraged regular SMBG at home. Provided patient with copy of [facility]'s \"What is Diabetes\" handout, \"Blood Glucose Goals\" handout, and \"What is A1c\" handout.     Time given for questions and answers. Pt does not voice any, neither does his mother at this time. We will plan for a follow-up visit tomorrow, attempt to engage pt in further education when feeling better.    Thank you for this consult.     Electronically signed by:  Sowmya Singh RN  09/21/23 12:14 EDT  "

## 2023-09-22 ENCOUNTER — READMISSION MANAGEMENT (OUTPATIENT)
Dept: CALL CENTER | Facility: HOSPITAL | Age: 28
End: 2023-09-22
Payer: MEDICAID

## 2023-09-22 VITALS
HEIGHT: 71 IN | TEMPERATURE: 98.1 F | DIASTOLIC BLOOD PRESSURE: 101 MMHG | OXYGEN SATURATION: 93 % | BODY MASS INDEX: 21 KG/M2 | RESPIRATION RATE: 18 BRPM | WEIGHT: 150 LBS | SYSTOLIC BLOOD PRESSURE: 144 MMHG | HEART RATE: 123 BPM

## 2023-09-22 PROBLEM — R11.2 INTRACTABLE NAUSEA AND VOMITING: Status: RESOLVED | Noted: 2023-09-19 | Resolved: 2023-09-22

## 2023-09-22 PROBLEM — I10 ESSENTIAL HYPERTENSION: Status: ACTIVE | Noted: 2023-09-22

## 2023-09-22 LAB
C PEPTIDE SERPL-MCNC: 1.4 NG/ML (ref 1.1–4.4)
GLUCOSE BLDC GLUCOMTR-MCNC: 200 MG/DL (ref 70–130)
POTASSIUM SERPL-SCNC: 4 MMOL/L (ref 3.5–5.2)
QT INTERVAL: 340 MS
QTC INTERVAL: 440 MS

## 2023-09-22 PROCEDURE — G0378 HOSPITAL OBSERVATION PER HR: HCPCS

## 2023-09-22 PROCEDURE — 96361 HYDRATE IV INFUSION ADD-ON: CPT

## 2023-09-22 PROCEDURE — 63710000001 INSULIN LISPRO (HUMAN) PER 5 UNITS: Performed by: INTERNAL MEDICINE

## 2023-09-22 PROCEDURE — 63710000001 INSULIN DETEMIR PER 5 UNITS: Performed by: INTERNAL MEDICINE

## 2023-09-22 PROCEDURE — 25010000002 HYDRALAZINE PER 20 MG: Performed by: FAMILY MEDICINE

## 2023-09-22 PROCEDURE — 84132 ASSAY OF SERUM POTASSIUM: CPT | Performed by: FAMILY MEDICINE

## 2023-09-22 PROCEDURE — 96376 TX/PRO/DX INJ SAME DRUG ADON: CPT

## 2023-09-22 PROCEDURE — 82948 REAGENT STRIP/BLOOD GLUCOSE: CPT

## 2023-09-22 RX ORDER — CLINDAMYCIN HYDROCHLORIDE 300 MG/1
300 CAPSULE ORAL EVERY 6 HOURS SCHEDULED
Qty: 15 CAPSULE | Refills: 0 | Status: SHIPPED | OUTPATIENT
Start: 2023-09-22 | End: 2023-09-26

## 2023-09-22 RX ORDER — PANTOPRAZOLE SODIUM 40 MG/1
40 TABLET, DELAYED RELEASE ORAL DAILY
Qty: 30 TABLET | Refills: 0 | Status: SHIPPED | OUTPATIENT
Start: 2023-09-22 | End: 2023-10-22

## 2023-09-22 RX ORDER — AMLODIPINE BESYLATE 10 MG/1
10 TABLET ORAL
Qty: 30 TABLET | Refills: 0 | Status: SHIPPED | OUTPATIENT
Start: 2023-09-22 | End: 2023-10-22

## 2023-09-22 RX ORDER — ONDANSETRON 8 MG/1
8 TABLET, ORALLY DISINTEGRATING ORAL EVERY 8 HOURS PRN
Qty: 15 TABLET | Refills: 0 | Status: SHIPPED | OUTPATIENT
Start: 2023-09-22 | End: 2023-09-27

## 2023-09-22 RX ADMIN — SODIUM CHLORIDE 100 ML/HR: 9 INJECTION, SOLUTION INTRAVENOUS at 06:31

## 2023-09-22 RX ADMIN — PANTOPRAZOLE SODIUM 40 MG: 40 INJECTION, POWDER, LYOPHILIZED, FOR SOLUTION INTRAVENOUS at 06:31

## 2023-09-22 RX ADMIN — LISINOPRIL 10 MG: 10 TABLET ORAL at 08:27

## 2023-09-22 RX ADMIN — INSULIN DETEMIR 10 UNITS: 100 INJECTION, SOLUTION SUBCUTANEOUS at 08:27

## 2023-09-22 RX ADMIN — CLINDAMYCIN HYDROCHLORIDE 300 MG: 150 CAPSULE ORAL at 06:31

## 2023-09-22 RX ADMIN — AMLODIPINE BESYLATE 10 MG: 10 TABLET ORAL at 08:27

## 2023-09-22 RX ADMIN — INSULIN LISPRO 3 UNITS: 100 INJECTION, SOLUTION INTRAVENOUS; SUBCUTANEOUS at 08:27

## 2023-09-22 RX ADMIN — HYDRALAZINE HYDROCHLORIDE 10 MG: 20 INJECTION INTRAMUSCULAR; INTRAVENOUS at 00:18

## 2023-09-22 RX ADMIN — CLINDAMYCIN HYDROCHLORIDE 300 MG: 150 CAPSULE ORAL at 00:18

## 2023-09-22 NOTE — OUTREACH NOTE
Prep Survey      Flowsheet Row Responses   [facility] patient discharged from? [facility]   Is LACE score < 7 ? Yes   Eligibility [facility]   Date of Admission 09/19/23   Date of Discharge 09/22/23   Discharge Disposition Home or Self Care   Discharge diagnosis Intractable nausea/emesis   Does the patient have one of the following disease processes/diagnoses(primary or secondary)? Other   Does the patient have Home health ordered? No   Is there a DME ordered? No   Prep survey completed? Yes            Liss PEGUERO - Registered Nurse

## 2023-09-22 NOTE — DISCHARGE SUMMARY
[facility] Medicine Services  DISCHARGE SUMMARY    Patient Name: Ford Mukherjee Jr.  : 1995  MRN: [id number]    Date of Admission: 2023  3:14 PM  Date of Discharge:  2023  Primary Care Physician: Shalini Goodson APRN    Consults       Date and Time Order Name Status Description    2023  2:06 PM Inpatient Cardiology Consult Completed             Hospital Course     Presenting Problem: N/V    Active Hospital Problems    Diagnosis  POA    Essential hypertension [I10]  Unknown      Resolved Hospital Problems    Diagnosis Date Resolved POA    Intractable nausea and vomiting [R11.2] 2023 Yes          Hospital Course:  28M with uncontrolled type 2 diabetes, intractable nausea/emesis, also likely R ingrown toenail with surrounding erythema, plans are for outpatient podiatry follow up. Patient reports longstanding history of Type II DM diagnosis, does not have expected habitus or presentation of Type II DM, will plan to order C-peptide level on patient. Continued supportive care at this time, patient does not regularly take medication outpatient or have PCP, CHADWICK working on establishing with new PCP.  Patient has had issues with both hypertension and tachycardia overnight, blood pressure much improved on  in the afternoon but having continued issues with tachycardia much worse with exertion. Cardiology consulted for evaluation and discussed with Dr. Hagen, patient to receive additional IVF bolus and will continue to monitor at this time. Patient may require additional observation at this time. Patient seen again on the morning of , patient overall did well overnight, heart rate remains with some mild asymptomatic tachycardia but not as severe, plans are for discharge home today with continued encouraged PO intake and outpatient follow up with his PCP. He will also be made a follow up appointment with podiatry for ingrown R toenail. Patient to continue  on oral antibiotics upon discharge. Patient has been stressed and advised importance of medication compliance at home. He also has been told the importance of following with his regular PCP.      Intractable nausea/emesis  Heartburn  - IV Zofran as needed; can use IV Reglan if this is effective.  - Diabetic diet currently ordered, can scale back to clear liquids or n.p.o. if the patient cannot tolerate.  - IVF with 0.9 NS.  - We will also add IV Protonix daily. Patient d/c home with continued protonix   - Resolved      Uncontrolled type 2 diabetes  - Levemir 10 units subcu twice daily.  -- A1C 7.9   - Add SSI coverage with Accu-Cheks AC/at bedtime.  - IVF with 0.9 NS.  - Diabetes educator.  - Obtain C-peptide level, patient confirms type II diagnosis but is also noncompliant with outpatient medical care. Still pending at this time, outpatient follow up with his PCP. Patient's metformin has been discontinued but he has been noncompliant, will d/c home with insulin Rx at this time.      Right great toe lesion  - Wound care nurse consult.  - Likely ingrown toenail, outpatient podiatry follow up, will transition to PO Clindamycin   - Keep clean and dry.  - Plans for outpatient follow up with podiatry      Occasional alcohol use  Positive THC on UDS  - His tachycardia currently is likely secondary to dehydration.  - Patient states he has not used any marijuana in a month despite the positive result; nausea/emesis may also be related to THC use.  - No other signs of withdrawal at this time; can add CIWA protocol if needed.  - EtOH negative      Tachycardia   - Persistent despite multiple medications   - Consulted cardiology for evaluation , Dr. Hagen saw patient on 9/22 and did bedside echo, IVC collapse so plans for additional IVF bolus and will order continued IVF, may require continued observation but will continue to monitor.   - Patient improved with continued fluid hydration, plans for outpatient follow up with his  PCP.      Hypertension    - Patient restarted on Lisinopril, also required multiple PRNs   - Started on Norvasc 10 mg daily and much improved, plans to discharge patient home with this medication.      DVT prophylaxis:  scds  Discharge Follow Up Recommendations for outpatient labs/diagnostics:   F/u with PCP and podiatry     Day of Discharge     HPI:   Patient is a 28-year-old male seen and examined by me this a.m., he is resting comfortably in bed and reports overall doing well.  Patient's heart rate has been overall more well controlled overnight following continued IV fluid hydration.  Patient to be discharged home today with outpatient follow-up with his PCP.  He denies any new acute complaints or problems this a.m., he will be made a follow-up appointment with podiatry for his right ingrown toenail    Review of Systems  Gen- No fevers, chills  CV- No chest pain, palpitations  Resp- No cough, dyspnea  GI- No N/V/D, abd pain      Vital Signs:   Temp:  [98 °F (36.7 °C)-98.2 °F (36.8 °C)] 98.1 °F (36.7 °C)  Heart Rate:  [105-157] 112  Resp:  [18] 18  BP: (129-162)/() 129/97      Physical Exam:  Constitutional: No acute distress, awake, alert and oriented x 3, resting in bed, updated again this AM.   HENT: NCAT, nares patent, mucous membranes moist  Respiratory: Clear to auscultation bilaterally, respiratory effort normal   Cardiovascular: Sinus tachycardia, no murmurs, rubs, or gallops  Gastrointestinal: Positive bowel sounds, soft, nontender, nondistended  Musculoskeletal: No bilateral ankle edema, R great toe appears ingrown, some mild erythema surround nail, no drainage   Psychiatric: Appropriate affect, cooperative  Neurologic: Oriented x 3, strength symmetric in all extremities, Cranial Nerves grossly intact to confrontation, speech clear  Skin: No rashes    Pertinent  and/or Most Recent Results     LAB RESULTS:      Lab 09/20/23  0821 09/19/23  2249 09/19/23  2127 09/19/23  1903 09/19/23  1552   WBC  9.14  --   --   --  8.23   HEMOGLOBIN 14.0  --   --   --  15.5   HEMATOCRIT 40.2  --   --   --  44.9   PLATELETS 256  --   --   --  292   NEUTROS ABS 7.29*  --   --   --  7.45*   IMMATURE GRANS (ABS) 0.03  --   --   --  0.03   LYMPHS ABS 1.01  --   --   --  0.32*   MONOS ABS 0.80  --   --   --  0.41   EOS ABS 0.00  --   --   --  0.01   MCV 86.6  --   --   --  85.9   LACTATE  --  1.8 2.2* 3.3* 2.9*         Lab 09/22/23  0007 09/21/23  1112 09/21/23  0400 09/20/23  2355 09/20/23  0821 09/19/23  1552   SODIUM  --   --  135*  --  138 142   POTASSIUM 4.0 3.6 3.1* 3.4* 3.5 3.6   CHLORIDE  --   --  96*  --  99 93*   CO2  --   --  29.0  --  26.0 32.0*   ANION GAP  --   --  10.0  --  13.0 17.0*   BUN  --   --  9  --  15 29*   CREATININE  --   --  0.49*  --  0.57* 0.83   EGFR  --   --  144.2  --  137.8 123.0   GLUCOSE  --   --  179*  --  230* 361*   CALCIUM  --   --  9.3  --  9.4 10.7*   MAGNESIUM  --   --   --   --  1.9  --    HEMOGLOBIN A1C  --   --   --   --   --  7.90*   TSH  --  1.010  --   --   --   --          Lab 09/20/23  0821 09/19/23  1552   TOTAL PROTEIN 7.2 8.3   ALBUMIN 4.6 5.5*   GLOBULIN 2.6 2.8   ALT (SGPT) 11 15   AST (SGOT) 9 11   BILIRUBIN 1.1 1.0   ALK PHOS 63 82                     Lab 09/19/23  1549   FIO2 21   CARBOXYHEMOGLOBIN (VENOUS) 1.3     Brief Urine Lab Results  (Last result in the past 365 days)        Color   Clarity   Blood   Leuk Est   Nitrite   Protein   CREAT   Urine HCG        09/19/23 1751 Yellow   Clear   Negative   Negative   Negative   >=300 mg/dL (3+)                 Microbiology Results (last 10 days)       Procedure Component Value - Date/Time    Blood Culture - Blood, Wrist, Right [[id number]]  (Normal) Collected: 09/19/23 2128    Lab Status: Preliminary result Specimen: Blood from Wrist, Right Updated: 09/21/23 2215     Blood Culture No growth at 2 days    Narrative:      Aerobic bottle only    Blood Culture - Blood, Arm, Left [[id number]]  (Normal) Collected: 09/19/23 2124    Lab  Status: Preliminary result Specimen: Blood from Arm, Left Updated: 09/21/23 2215     Blood Culture No growth at 2 days    COVID PRE-OP / PRE-PROCEDURE SCREENING ORDER (NO ISOLATION) - Swab, Nasopharynx [[id number]]  (Normal) Collected: 09/19/23 1559    Lab Status: Final result Specimen: Swab from Nasopharynx Updated: 09/19/23 1708    Narrative:      The following orders were created for panel order COVID PRE-OP / PRE-PROCEDURE SCREENING ORDER (NO ISOLATION) - Swab, Nasopharynx.  Procedure                               Abnormality         Status                     ---------                               -----------         ------                     Respiratory Panel PCR w/...[[id number]]  Normal              Final result                 Please view results for these tests on the individual orders.    Respiratory Panel PCR w/COVID-19(SARS-CoV-2) LIANNE/CASEY/EVERETTE/PAD/COR/MAD/MELANIE In-House, NP Swab in UTM/VTM, 3-4 HR TAT - Swab, Nasopharynx [[id number]]  (Normal) Collected: 09/19/23 0829    Lab Status: Final result Specimen: Swab from Nasopharynx Updated: 09/19/23 1708     ADENOVIRUS, PCR Not Detected     Coronavirus 229E Not Detected     Coronavirus HKU1 Not Detected     Coronavirus NL63 Not Detected     Coronavirus OC43 Not Detected     COVID19 Not Detected     Human Metapneumovirus Not Detected     Human Rhinovirus/Enterovirus Not Detected     Influenza A PCR Not Detected     Influenza B PCR Not Detected     Parainfluenza Virus 1 Not Detected     Parainfluenza Virus 2 Not Detected     Parainfluenza Virus 3 Not Detected     Parainfluenza Virus 4 Not Detected     RSV, PCR Not Detected     Bordetella pertussis pcr Not Detected     Bordetella parapertussis PCR Not Detected     Chlamydophila pneumoniae PCR Not Detected     Mycoplasma pneumo by PCR Not Detected    Narrative:      In the setting of a positive respiratory panel with a viral infection PLUS a negative procalcitonin without other underlying concern for bacterial  infection, consider observing off antibiotics or discontinuation of antibiotics and continue supportive care. If the respiratory panel is positive for atypical bacterial infection (Bordetella pertussis, Chlamydophila pneumoniae, or Mycoplasma pneumoniae), consider antibiotic de-escalation to target atypical bacterial infection.            CT Abdomen Pelvis Without Contrast    Result Date: 9/19/2023  CT ABDOMEN PELVIS WO CONTRAST Date of Exam: 9/19/2023 4:17 PM CDT Indication: nv abd pain. Comparison: 3/9/2023 Technique: Axial CT images were obtained of the abdomen and pelvis without the administration of contrast. Reconstructed coronal and sagittal images were also obtained. Automated exposure control and iterative construction methods were used. Findings: LUNG BASES:  Unremarkable without mass or infiltrate. LIVER:  Unremarkable parenchyma without focal lesion. BILIARY/GALLBLADDER: There is sludge within the gallbladder. There are no gallbladder inflammatory changes. SPLEEN:  Unremarkable PANCREAS:  Unremarkable ADRENAL:  Unremarkable KIDNEYS:  Unremarkable parenchyma with no solid mass identified. No obstruction.  No calculus identified. GASTROINTESTINAL/MESENTERY:  No evidence of obstruction nor inflammation.  The appendix is normal. AORTA/IVC:  Normal caliber. RETROPERITONEUM/LYMPH NODES:  Unremarkable REPRODUCTIVE:  Unremarkable BLADDER:  Unremarkable OSSEUS STRUCTURES:  Typical for age with no acute process identified.     Impression: 1.No acute process identified. Electronically Signed: Dejan Delgado MD  9/19/2023 4:26 PM CDT  Workstation ID: [id number]    XR Foot 3+ View Right    Result Date: 9/19/2023  XR FOOT 3+ VW RIGHT Date of Exam: 9/19/2023 2:26 PM CDT Indication: right great toe infections. osteo? Comparison: None available. Findings: No evidence of acute fracture nor dislocation. Alignment is normal. Joint space is adequately maintained. No significant effusion identified. Soft tissues are  unremarkable. No radiopaque foreign body identified.     Impression: No acute osseous process identified. No radiographic evidence of osteomyelitis. Electronically Signed: Dejan Delgado MD  9/19/2023 3:18 PM CDT  Workstation ID: [id number]    XR Chest 1 View    Result Date: 9/19/2023  XR CHEST 1 VW Date of Exam: 9/19/2023 3:26 PM EDT Indication: dka. cough. Comparison: March 9, 2023 Findings: Heart not definitely enlarged. Lungs seem clear. There are no pleural effusions. Visualized osseous structures do not appear unusual.     Impression: 1.An acute pulmonary process is not apparent. Electronically Signed: Gm Quiroz MD  9/19/2023 4:15 PM EDT  Workstation ID: [id number]    MRI Foot Right Without Contrast    Result Date: 9/19/2023  MRI FOOT RIGHT WO CONTRAST Date of Exam: 9/19/2023 5:50 PM EDT Indication: right great toe swelling. eval for osteo..  Comparison: None available. Technique:  Routine multiplanar/multisequence sequence images of the right foot were obtained without contrast administration. Findings: There is edema within the soft tissues overlying the distal phalanx of the first digit particularly of the lung the nail bed and most distal soft tissues overlying the tuft. There is subtle edema within the tuft of the distal phalanx of the first digit. There is no cortical destruction. No intraosseous fluid collection. No soft tissue fluid collection. Visualized portions of the extent and flexor tendons appear intact.    Impression: 1. Soft tissue edema overlying the distal phalanx of the first digit with subtle underlying bone edema which is nonspecific and may be reactive. No cortical destruction identified to suggest osteomyelitis at this time. No evidence of soft tissue or intraosseous abscess. Electronically Signed: Fabrizio Dietrich MD  9/19/2023 8:08 PM EDT  Workstation ID: [id number]                 Plan for Follow-up of Pending Labs/Results: Follow up with PCP concerning his diabetes, blood cultures are NGTD  x 48hrs   Pending Labs       Order Current Status    C-Peptide In process    Blood Culture - Blood, Arm, Left Preliminary result    Blood Culture - Blood, Wrist, Right Preliminary result          Discharge Details        Discharge Medications        New Medications        Instructions Start Date   amLODIPine 10 MG tablet  Commonly known as: NORVASC   10 mg, Oral, Every 24 Hours Scheduled      clindamycin 300 MG capsule  Commonly known as: CLEOCIN   300 mg, Oral, Every 6 Hours Scheduled      pantoprazole 40 MG EC tablet  Commonly known as: Protonix   40 mg, Oral, Daily             Continue These Medications        Instructions Start Date   Alcohol Swabs pads   1 swab , Does not apply, Daily      B-D UF III MINI PEN NEEDLES 31G X 5 MM misc  Generic drug: Insulin Pen Needle   1 pen , Does not apply, Nightly      Blood Glucose Monitoring Suppl w/Device kit   Check BG one time per day.      freestyle lancets   Check BG once per day.      glucose blood test strip   Check BG one time per day.      hyoscyamine 0.125 MG SL tablet  Commonly known as: LEVSIN   0.125 mg, Oral, Every 4 Hours PRN      insulin detemir 100 UNIT/ML injection  Commonly known as: LEVEMIR   20 Units, Subcutaneous, Nightly      lisinopril 10 MG tablet  Commonly known as: PRINIVIL,ZESTRIL   10 mg, Oral, Daily      ondansetron ODT 8 MG disintegrating tablet  Commonly known as: ZOFRAN-ODT   8 mg, Translingual, Every 8 Hours PRN             Stop These Medications      metFORMIN 1000 MG tablet  Commonly known as: GLUCOPHAGE              No Known Allergies      Discharge Disposition:  Home or Self Care    Diet:  Hospital:  Diet Order   Procedures    Diet: Diabetic Diets; Consistent Carbohydrate; Texture: Regular Texture (IDDSI 7); Fluid Consistency: Thin (IDDSI 0)       Activity:  Resume previous     Restrictions or Other Recommendations:  Encouraged increased PO intake/hydration  Follow up with PCP  Medication compliance        CODE STATUS:    Code Status  and Medical Interventions:   Ordered at: 09/19/23 2024     Level Of Support Discussed With:    Patient     Code Status (Patient has no pulse and is not breathing):    CPR (Attempt to Resuscitate)     Medical Interventions (Patient has pulse or is breathing):    Full Support       No future appointments.    Additional Instructions for the Follow-ups that You Need to Schedule       Discharge Follow-up with PCP   As directed       Currently Documented PCP:    Shalini Goodson APRN    PCP Phone Number:    [phone]     Follow Up Details: Follow up with PCP        Discharge Follow-up with Specified Provider: Follow up with podiatry in 1-2 weeks for ingrown R great toenail   As directed      To: Follow up with podiatry in 1-2 weeks for ingrown R great toenail                      YAZ Prescott DO  09/22/23      Time Spent on Discharge:  I spent  35  minutes on this discharge activity which included: face-to-face encounter with the patient, reviewing the data in the system, coordination of the care with the nursing staff as well as consultants, documentation, and entering orders.

## 2023-09-24 ENCOUNTER — TRANSITIONAL CARE MANAGEMENT TELEPHONE ENCOUNTER (OUTPATIENT)
Dept: CALL CENTER | Facility: HOSPITAL | Age: 28
End: 2023-09-24
Payer: MEDICAID

## 2023-09-24 LAB
BACTERIA SPEC AEROBE CULT: NORMAL
BACTERIA SPEC AEROBE CULT: NORMAL

## 2023-09-24 NOTE — OUTREACH NOTE
Call Center TCM Note      Flowsheet Row Responses   [facility] patient discharged from? [place]   Does the patient have one of the following disease processes/diagnoses(primary or secondary)? Other   TCM attempt successful? No   Unsuccessful attempts Attempt 1            Mariia Black RN    9/24/2023, 11:12 EDT

## 2023-09-25 ENCOUNTER — TRANSITIONAL CARE MANAGEMENT TELEPHONE ENCOUNTER (OUTPATIENT)
Dept: CALL CENTER | Facility: HOSPITAL | Age: 28
End: 2023-09-25

## 2023-09-25 LAB
QT INTERVAL: 278 MS
QTC INTERVAL: 429 MS

## 2023-09-25 NOTE — OUTREACH NOTE
Call Center TCM Note      Flowsheet Row Responses   [facility] patient discharged from? [place]   Does the patient have one of the following disease processes/diagnoses(primary or secondary)? Other   TCM attempt successful? No  [No current VR]   Unsuccessful attempts Attempt 2            Gaby Mak RN    9/25/2023, 10:29 EDT

## 2023-09-26 ENCOUNTER — TRANSITIONAL CARE MANAGEMENT TELEPHONE ENCOUNTER (OUTPATIENT)
Dept: CALL CENTER | Facility: HOSPITAL | Age: 28
End: 2023-09-26
Payer: MEDICAID

## 2023-09-26 NOTE — OUTREACH NOTE
Call Center TCM Note      Flowsheet Row Responses   [facility] patient discharged from? [place]   Does the patient have one of the following disease processes/diagnoses(primary or secondary)? Other   TCM attempt successful? No   Unsuccessful attempts Attempt 3            Lian Tyler LPN    9/26/2023, 08:29 EDT

## 2024-01-06 ENCOUNTER — HOSPITAL ENCOUNTER (EMERGENCY)
Facility: HOSPITAL | Age: 29
Discharge: HOME | End: 2024-01-06
Attending: EMERGENCY MEDICINE
Payer: MEDICAID

## 2024-01-06 VITALS
SYSTOLIC BLOOD PRESSURE: 147 MMHG | HEIGHT: 71 IN | TEMPERATURE: 98.6 F | RESPIRATION RATE: 16 BRPM | HEART RATE: 105 BPM | WEIGHT: 155 LBS | BODY MASS INDEX: 21.7 KG/M2 | DIASTOLIC BLOOD PRESSURE: 105 MMHG | OXYGEN SATURATION: 97 %

## 2024-01-06 DIAGNOSIS — R11.2 NAUSEA AND VOMITING, UNSPECIFIED VOMITING TYPE: Primary | ICD-10-CM

## 2024-01-06 DIAGNOSIS — E11.65 TYPE 2 DIABETES MELLITUS WITH HYPERGLYCEMIA, WITH LONG-TERM CURRENT USE OF INSULIN: ICD-10-CM

## 2024-01-06 DIAGNOSIS — R73.9 HYPERGLYCEMIA: ICD-10-CM

## 2024-01-06 DIAGNOSIS — K29.20 ACUTE ALCOHOLIC GASTRITIS WITHOUT HEMORRHAGE: ICD-10-CM

## 2024-01-06 DIAGNOSIS — Z79.4 TYPE 2 DIABETES MELLITUS WITH HYPERGLYCEMIA, WITH LONG-TERM CURRENT USE OF INSULIN: ICD-10-CM

## 2024-01-06 LAB
ALBUMIN SERPL-MCNC: 5.4 G/DL (ref 3.5–5.2)
ALBUMIN/GLOB SERPL: 1.7 G/DL
ALP SERPL-CCNC: 92 U/L (ref 39–117)
ALT SERPL W P-5'-P-CCNC: 17 U/L (ref 1–41)
ANION GAP SERPL CALCULATED.3IONS-SCNC: 18 MMOL/L (ref 5–15)
AST SERPL-CCNC: 13 U/L (ref 1–40)
ATMOSPHERIC PRESS: ABNORMAL MM[HG]
B-OH-BUTYR SERPL-SCNC: 1.66 MMOL/L (ref 0.02–0.27)
BACTERIA UR QL AUTO: ABNORMAL /HPF
BASE EXCESS BLDV CALC-SCNC: 4.9 MMOL/L (ref -2–2)
BASOPHILS # BLD AUTO: 0.02 10*3/MM3 (ref 0–0.2)
BASOPHILS NFR BLD AUTO: 0.2 % (ref 0–1.5)
BDY SITE: ABNORMAL
BILIRUB SERPL-MCNC: 0.8 MG/DL (ref 0–1.2)
BILIRUB UR QL STRIP: NEGATIVE
BODY TEMPERATURE: 37
BUN SERPL-MCNC: 22 MG/DL (ref 6–20)
BUN/CREAT SERPL: 25.9 (ref 7–25)
CALCIUM SPEC-SCNC: 10.2 MG/DL (ref 8.6–10.5)
CHLORIDE SERPL-SCNC: 97 MMOL/L (ref 98–107)
CLARITY UR: ABNORMAL
CO2 BLDA-SCNC: 29.5 MMOL/L (ref 22–33)
CO2 SERPL-SCNC: 25 MMOL/L (ref 22–29)
COHGB MFR BLD: 1.2 %
COLOR UR: YELLOW
CREAT SERPL-MCNC: 0.85 MG/DL (ref 0.76–1.27)
DEPRECATED RDW RBC AUTO: 38.5 FL (ref 37–54)
EGFRCR SERPLBLD CKD-EPI 2021: 122.1 ML/MIN/1.73
EOSINOPHIL # BLD AUTO: 0 10*3/MM3 (ref 0–0.4)
EOSINOPHIL NFR BLD AUTO: 0 % (ref 0.3–6.2)
EPAP: 0
ERYTHROCYTE [DISTWIDTH] IN BLOOD BY AUTOMATED COUNT: 12.5 % (ref 12.3–15.4)
ETHANOL BLD-MCNC: <10 MG/DL (ref 0–10)
FLUAV RNA RESP QL NAA+PROBE: NOT DETECTED
FLUBV RNA RESP QL NAA+PROBE: NOT DETECTED
GLOBULIN UR ELPH-MCNC: 3.2 GM/DL
GLUCOSE BLDC GLUCOMTR-MCNC: 219 MG/DL (ref 70–130)
GLUCOSE BLDC GLUCOMTR-MCNC: 219 MG/DL (ref 70–130)
GLUCOSE SERPL-MCNC: 255 MG/DL (ref 65–99)
GLUCOSE UR STRIP-MCNC: ABNORMAL MG/DL
HCO3 BLDV-SCNC: 28.4 MMOL/L (ref 22–28)
HCT VFR BLD AUTO: 43.5 % (ref 37.5–51)
HGB BLD-MCNC: 15.3 G/DL (ref 13–17.7)
HGB BLDA-MCNC: 15.8 G/DL (ref 13.5–17.5)
HGB UR QL STRIP.AUTO: NEGATIVE
HOLD SPECIMEN: NORMAL
HYALINE CASTS UR QL AUTO: ABNORMAL /LPF
IMM GRANULOCYTES # BLD AUTO: 0.04 10*3/MM3 (ref 0–0.05)
IMM GRANULOCYTES NFR BLD AUTO: 0.3 % (ref 0–0.5)
INHALED O2 CONCENTRATION: 21 %
IPAP: 0
KETONES UR QL STRIP: ABNORMAL
LEUKOCYTE ESTERASE UR QL STRIP.AUTO: NEGATIVE
LIPASE SERPL-CCNC: 9 U/L (ref 13–60)
LYMPHOCYTES # BLD AUTO: 1.02 10*3/MM3 (ref 0.7–3.1)
LYMPHOCYTES NFR BLD AUTO: 8.3 % (ref 19.6–45.3)
MCH RBC QN AUTO: 29.7 PG (ref 26.6–33)
MCHC RBC AUTO-ENTMCNC: 35.2 G/DL (ref 31.5–35.7)
MCV RBC AUTO: 84.3 FL (ref 79–97)
METHGB BLD QL: 0.4 %
MODALITY: ABNORMAL
MONOCYTES # BLD AUTO: 0.55 10*3/MM3 (ref 0.1–0.9)
MONOCYTES NFR BLD AUTO: 4.5 % (ref 5–12)
MUCOUS THREADS URNS QL MICRO: ABNORMAL /HPF
NEUTROPHILS NFR BLD AUTO: 10.71 10*3/MM3 (ref 1.7–7)
NEUTROPHILS NFR BLD AUTO: 86.7 % (ref 42.7–76)
NITRITE UR QL STRIP: NEGATIVE
NRBC BLD AUTO-RTO: 0 /100 WBC (ref 0–0.2)
OXYHGB MFR BLDV: 66.7 %
PAW @ PEAK INSP FLOW SETTING VENT: 0 CMH2O
PCO2 BLDV: 37.3 MM HG (ref 41–51)
PH BLDV: 7.49 PH UNITS (ref 7.31–7.41)
PH UR STRIP.AUTO: 6 [PH] (ref 5–8)
PLATELET # BLD AUTO: 276 10*3/MM3 (ref 140–450)
PMV BLD AUTO: 10.1 FL (ref 6–12)
PO2 BLDV: 32.9 MM HG (ref 27–53)
POTASSIUM SERPL-SCNC: 3.5 MMOL/L (ref 3.5–5.2)
PROT SERPL-MCNC: 8.6 G/DL (ref 6–8.5)
PROT UR QL STRIP: ABNORMAL
RBC # BLD AUTO: 5.16 10*6/MM3 (ref 4.14–5.8)
RBC # UR STRIP: ABNORMAL /HPF
REF LAB TEST METHOD: ABNORMAL
SARS-COV-2 RNA RESP QL NAA+PROBE: NOT DETECTED
SODIUM SERPL-SCNC: 140 MMOL/L (ref 136–145)
SP GR UR STRIP: 1.04 (ref 1–1.03)
SQUAMOUS #/AREA URNS HPF: ABNORMAL /HPF
TOTAL RATE: 0 BREATHS/MINUTE
UROBILINOGEN UR QL STRIP: ABNORMAL
WBC # UR STRIP: ABNORMAL /HPF
WBC CASTS #/AREA URNS LPF: ABNORMAL /LPF
WBC NRBC COR # BLD AUTO: 12.34 10*3/MM3 (ref 3.4–10.8)
WHOLE BLOOD HOLD COAG: NORMAL
WHOLE BLOOD HOLD SPECIMEN: NORMAL

## 2024-01-06 PROCEDURE — 82010 KETONE BODYS QUAN: CPT | Performed by: PHYSICIAN ASSISTANT

## 2024-01-06 PROCEDURE — 99283 EMERGENCY DEPT VISIT LOW MDM: CPT

## 2024-01-06 PROCEDURE — 82805 BLOOD GASES W/O2 SATURATION: CPT

## 2024-01-06 PROCEDURE — 82948 REAGENT STRIP/BLOOD GLUCOSE: CPT

## 2024-01-06 PROCEDURE — 25010000002 ONDANSETRON PER 1 MG: Performed by: PHYSICIAN ASSISTANT

## 2024-01-06 PROCEDURE — 85025 COMPLETE CBC W/AUTO DIFF WBC: CPT | Performed by: EMERGENCY MEDICINE

## 2024-01-06 PROCEDURE — 96374 THER/PROPH/DIAG INJ IV PUSH: CPT

## 2024-01-06 PROCEDURE — 25810000003 SODIUM CHLORIDE 0.9 % SOLUTION: Performed by: PHYSICIAN ASSISTANT

## 2024-01-06 PROCEDURE — 96375 TX/PRO/DX INJ NEW DRUG ADDON: CPT

## 2024-01-06 PROCEDURE — 80053 COMPREHEN METABOLIC PANEL: CPT | Performed by: EMERGENCY MEDICINE

## 2024-01-06 PROCEDURE — 83690 ASSAY OF LIPASE: CPT | Performed by: EMERGENCY MEDICINE

## 2024-01-06 PROCEDURE — 82077 ASSAY SPEC XCP UR&BREATH IA: CPT | Performed by: PHYSICIAN ASSISTANT

## 2024-01-06 PROCEDURE — 96361 HYDRATE IV INFUSION ADD-ON: CPT

## 2024-01-06 PROCEDURE — 87636 SARSCOV2 & INF A&B AMP PRB: CPT | Performed by: PHYSICIAN ASSISTANT

## 2024-01-06 PROCEDURE — 81001 URINALYSIS AUTO W/SCOPE: CPT | Performed by: EMERGENCY MEDICINE

## 2024-01-06 RX ORDER — LANCETS 28 GAUGE
EACH MISCELLANEOUS
Qty: 100 EACH | Refills: 0 | Status: SHIPPED | OUTPATIENT
Start: 2024-01-06

## 2024-01-06 RX ORDER — FLURBIPROFEN SODIUM 0.3 MG/ML
1 SOLUTION/ DROPS OPHTHALMIC NIGHTLY
Qty: 30 EACH | Refills: 0 | Status: SHIPPED | OUTPATIENT
Start: 2024-01-06

## 2024-01-06 RX ORDER — ONDANSETRON 4 MG/1
4 TABLET, ORALLY DISINTEGRATING ORAL EVERY 6 HOURS PRN
Qty: 15 TABLET | Refills: 0 | Status: SHIPPED | OUTPATIENT
Start: 2024-01-06

## 2024-01-06 RX ORDER — BLOOD PRESSURE TEST KIT
1 KIT MISCELLANEOUS DAILY
Qty: 100 EACH | Refills: 0 | Status: SHIPPED | OUTPATIENT
Start: 2024-01-06

## 2024-01-06 RX ORDER — ONDANSETRON 2 MG/ML
4 INJECTION INTRAMUSCULAR; INTRAVENOUS ONCE
Status: COMPLETED | OUTPATIENT
Start: 2024-01-06 | End: 2024-01-06

## 2024-01-06 RX ORDER — FAMOTIDINE 10 MG/ML
20 INJECTION, SOLUTION INTRAVENOUS ONCE
Status: COMPLETED | OUTPATIENT
Start: 2024-01-06 | End: 2024-01-06

## 2024-01-06 RX ORDER — SODIUM CHLORIDE 9 MG/ML
10 INJECTION INTRAVENOUS AS NEEDED
Status: DISCONTINUED | OUTPATIENT
Start: 2024-01-06 | End: 2024-01-06 | Stop reason: HOSPADM

## 2024-01-06 RX ORDER — LISINOPRIL 10 MG/1
10 TABLET ORAL DAILY
Qty: 30 TABLET | Refills: 0 | Status: SHIPPED | OUTPATIENT
Start: 2024-01-06

## 2024-01-06 RX ADMIN — SODIUM CHLORIDE 2000 ML: 9 INJECTION, SOLUTION INTRAVENOUS at 15:39

## 2024-01-06 RX ADMIN — ONDANSETRON 4 MG: 2 INJECTION INTRAMUSCULAR; INTRAVENOUS at 15:41

## 2024-01-06 RX ADMIN — FAMOTIDINE 20 MG: 10 INJECTION, SOLUTION INTRAVENOUS at 15:41

## 2024-01-06 NOTE — ED PROVIDER NOTES
"Subjective   History of Present Illness  Mr. Mukherjee is a 28-year-old male with a history of insulin-dependent diabetes type 2 and hypertension, who presents to the emergency department with complaints of multiple episodes of nausea and vomiting since yesterday.  The patient states that he \"drank too much on New Year's Linda\" and has had GI issues since then.  He denies any abdominal pain.  He had some diarrhea yesterday but that has resolved.  Normal urination.  No fever or chills.  No cough or shortness of breath.  No chest pain.  The patient states that he left his insulin and glucometer at the [employer] that he was celebrating at in [place] and has been out of his meds for the past few days.      Review of Systems   Constitutional:  Negative for appetite change, chills, diaphoresis and fever.   HENT:  Negative for sore throat.    Respiratory:  Negative for cough and shortness of breath.    Cardiovascular:  Negative for chest pain.   Gastrointestinal:  Positive for nausea and vomiting. Negative for abdominal pain and blood in stool.   Genitourinary:  Negative for dysuria.   Musculoskeletal:  Negative for back pain.   Skin:  Negative for color change and pallor.   Allergic/Immunologic: Negative for immunocompromised state.   Neurological:  Positive for light-headedness.   Hematological: Negative.    Psychiatric/Behavioral: Negative.         Past Medical History:   Diagnosis Date    Diabetes mellitus     PT DENIES THIS DX, PREVIOUS DOCUMENTED    GI (gastrointestinal bleed)        No Known Allergies    History reviewed. No pertinent surgical history.    Family History   Problem Relation Age of Onset    Diabetes Mother     No Known Problems Father     Colon cancer Neg Hx     Esophageal cancer Neg Hx     Inflammatory bowel disease Neg Hx     Irritable bowel syndrome Neg Hx     Ulcerative colitis Neg Hx        Social History     Socioeconomic History    Marital status: Significant Other   Tobacco Use    Smoking status: " "Former    Smokeless tobacco: Never   Vaping Use    Vaping Use: Never used   Substance and Sexual Activity    Alcohol use: Yes     Comment: SOCIALLY    Drug use: No    Sexual activity: Yes     Partners: Female           Objective   Physical Exam  Constitutional:       Appearance: He is not toxic-appearing or diaphoretic.   HENT:      Head: Normocephalic.      Nose: Nose normal.      Mouth/Throat:      Mouth: Mucous membranes are moist.      Pharynx: Oropharynx is clear. No oropharyngeal exudate.   Eyes:      General: No scleral icterus.     Conjunctiva/sclera: Conjunctivae normal.      Pupils: Pupils are equal, round, and reactive to light.   Cardiovascular:      Rate and Rhythm: Regular rhythm. Tachycardia present.      Pulses: Normal pulses.   Pulmonary:      Effort: Pulmonary effort is normal.      Breath sounds: Normal breath sounds. No wheezing, rhonchi or rales.   Abdominal:      Tenderness: There is no abdominal tenderness. There is no guarding.   Musculoskeletal:      Cervical back: Normal range of motion and neck supple.      Right lower leg: No edema.      Left lower leg: No edema.   Skin:     General: Skin is warm and dry.      Coloration: Skin is not jaundiced or pale.   Neurological:      General: No focal deficit present.      Mental Status: He is alert and oriented to person, place, and time.   Psychiatric:         Mood and Affect: Mood normal.         Procedures           ED Course      In summary, 28-year-old male with history of type 2 insulin-dependent diabetes and hypertension, presents with 2 days of nausea and vomiting.  The patient states that he \"drank too much on New Year's Linda\".  He denies any abdominal pain.  He had some diarrhea yesterday but that has resolved today.  He denies any blood in his emesis or stools.  No urinary symptoms.  He denies any cough or shortness of breath.  No fever.  The patient states that he left his insulin and glucometer at the [employer] where he was celebrating in " [place] on New Year's Linda and has not had any of his meds since then.    MDM: Differential includes DKA, dehydration, electrolyte disorder, pancreatitis, renal failure, etc.    Labs, UA and VBG ordered.  Patient started on IV fluids and given a dose of Zofran and Pepcid.    18:15 EST  Blood glucose is 219.  BetaHydroxybutyrate is 1.658.  VBG shows pH of 7.490.    Lipase is 9.  White count is 12.34.  No anemia.  I spoke with the pt after 2 liters of saline in and dose of Zofran and Pepcid and Protonix.  He states he feels much better and wants to go home.  He will need refills of his meds and glucometer.  He will need to f/u with his PCP or return if symptoms persist or worsen.                                               Medical Decision Making  Amount and/or Complexity of Data Reviewed  Labs: ordered.    Risk  Prescription drug management.        Final diagnoses:   Nausea and vomiting, unspecified vomiting type   Hyperglycemia   Acute alcoholic gastritis without hemorrhage       ED Disposition  ED Disposition       ED Disposition   Discharge    Condition   Stable    Comment   --               Shalini Goodson, APRN  [address] [id number] [phone]      call tomorrow for follow up    [facility] EMERGENCY DEPARTMENT  [address] [address] [phone]    If symptoms worsen         Medication List        New Prescriptions      ondansetron ODT 4 MG disintegrating tablet  Commonly known as: ZOFRAN-ODT  Place 1 tablet on the tongue Every 6 (Six) Hours As Needed for Nausea.               Where to Get Your Medications        These medications were sent to [id number] - [place], [place] - [address] [place] [address]  AT [facility] - [phone]  - [phone]   [address] [facility] [address]      Phone: [phone]   Alcohol Swabs pads  B-D UF III MINI PEN NEEDLES 31G X 5 MM misc  Blood Glucose  Monitoring Suppl w/Device kit  freestyle lancets  glucose blood test strip  hyoscyamine 0.125 MG SL tablet  insulin detemir 100 UNIT/ML injection  lisinopril 10 MG tablet  ondansetron ODT 4 MG disintegrating tablet            Alan Pappas, PA  01/06/24 1813

## 2024-01-06 NOTE — DISCHARGE INSTRUCTIONS
Avoid alcohol use.  Plenty of fluids.  Zofran as prescribed for nausea.  Restart your medications.  Follow up with your PCP this week.  Return if symptoms persist or worsen.

## 2024-01-11 ENCOUNTER — TELEPHONE (OUTPATIENT)
Dept: CASE MANAGEMENT | Facility: OTHER | Age: 29
End: 2024-01-11
Payer: MEDICAID

## 2024-01-15 ENCOUNTER — PATIENT OUTREACH (OUTPATIENT)
Dept: CASE MANAGEMENT | Facility: OTHER | Age: 29
End: 2024-01-15
Payer: MEDICAID

## 2024-01-15 NOTE — OUTREACH NOTE
AMBULATORY CASE MANAGEMENT NOTE    Name and Relationship of Patient/Support Person: Ford Mukherjee Jr. - Self    Patient Outreach    Attempted to reach patient as an ER follow up call. Patient answered then hung up. Tried to call patient back, no answer. [facility] closed.     Guerda ZHANG  Ambulatory Case Management    1/15/2024, 09:38 EST

## 2024-03-28 ENCOUNTER — HOSPITAL ENCOUNTER (EMERGENCY)
Facility: HOSPITAL | Age: 29
Discharge: HOME | End: 2024-03-28
Attending: STUDENT IN AN ORGANIZED HEALTH CARE EDUCATION/TRAINING PROGRAM
Payer: MEDICAID

## 2024-03-28 ENCOUNTER — APPOINTMENT (OUTPATIENT)
Dept: GENERAL RADIOLOGY | Facility: HOSPITAL | Age: 29
End: 2024-03-28
Payer: MEDICAID

## 2024-03-28 VITALS
HEIGHT: 71 IN | OXYGEN SATURATION: 96 % | DIASTOLIC BLOOD PRESSURE: 94 MMHG | BODY MASS INDEX: 21.7 KG/M2 | WEIGHT: 155 LBS | HEART RATE: 92 BPM | RESPIRATION RATE: 18 BRPM | SYSTOLIC BLOOD PRESSURE: 143 MMHG | TEMPERATURE: 98.3 F

## 2024-03-28 DIAGNOSIS — E11.65 TYPE 2 DIABETES MELLITUS WITH HYPERGLYCEMIA, WITH LONG-TERM CURRENT USE OF INSULIN: ICD-10-CM

## 2024-03-28 DIAGNOSIS — Z79.4 TYPE 2 DIABETES MELLITUS WITH HYPERGLYCEMIA, WITH LONG-TERM CURRENT USE OF INSULIN: ICD-10-CM

## 2024-03-28 DIAGNOSIS — R73.9 HYPERGLYCEMIA: ICD-10-CM

## 2024-03-28 DIAGNOSIS — R11.2 NAUSEA AND VOMITING, UNSPECIFIED VOMITING TYPE: Primary | ICD-10-CM

## 2024-03-28 LAB
ALBUMIN SERPL-MCNC: 5.2 G/DL (ref 3.5–5.2)
ALBUMIN/GLOB SERPL: 1.6 G/DL
ALP SERPL-CCNC: 88 U/L (ref 39–117)
ALT SERPL W P-5'-P-CCNC: 34 U/L (ref 1–41)
ANION GAP SERPL CALCULATED.3IONS-SCNC: 18 MMOL/L (ref 5–15)
AST SERPL-CCNC: 23 U/L (ref 1–40)
BACTERIA UR QL AUTO: ABNORMAL /HPF
BASOPHILS # BLD AUTO: 0.01 10*3/MM3 (ref 0–0.2)
BASOPHILS NFR BLD AUTO: 0.1 % (ref 0–1.5)
BILIRUB SERPL-MCNC: 0.8 MG/DL (ref 0–1.2)
BILIRUB UR QL STRIP: NEGATIVE
BUN SERPL-MCNC: 16 MG/DL (ref 6–20)
BUN/CREAT SERPL: 24.6 (ref 7–25)
CALCIUM SPEC-SCNC: 10.1 MG/DL (ref 8.6–10.5)
CHLORIDE SERPL-SCNC: 91 MMOL/L (ref 98–107)
CLARITY UR: CLEAR
CO2 SERPL-SCNC: 23 MMOL/L (ref 22–29)
COLOR UR: YELLOW
CREAT SERPL-MCNC: 0.65 MG/DL (ref 0.76–1.27)
DEPRECATED RDW RBC AUTO: 39.1 FL (ref 37–54)
EGFRCR SERPLBLD CKD-EPI 2021: 131.6 ML/MIN/1.73
EOSINOPHIL # BLD AUTO: 0 10*3/MM3 (ref 0–0.4)
EOSINOPHIL NFR BLD AUTO: 0 % (ref 0.3–6.2)
ERYTHROCYTE [DISTWIDTH] IN BLOOD BY AUTOMATED COUNT: 12.7 % (ref 12.3–15.4)
GLOBULIN UR ELPH-MCNC: 3.2 GM/DL
GLUCOSE BLDC GLUCOMTR-MCNC: 189 MG/DL (ref 70–130)
GLUCOSE BLDC GLUCOMTR-MCNC: 226 MG/DL (ref 70–130)
GLUCOSE SERPL-MCNC: 291 MG/DL (ref 65–99)
GLUCOSE UR STRIP-MCNC: ABNORMAL MG/DL
HCT VFR BLD AUTO: 45 % (ref 37.5–51)
HGB BLD-MCNC: 15.9 G/DL (ref 13–17.7)
HGB UR QL STRIP.AUTO: ABNORMAL
HOLD SPECIMEN: NORMAL
HYALINE CASTS UR QL AUTO: ABNORMAL /LPF
IMM GRANULOCYTES # BLD AUTO: 0.02 10*3/MM3 (ref 0–0.05)
IMM GRANULOCYTES NFR BLD AUTO: 0.3 % (ref 0–0.5)
KETONES UR QL STRIP: ABNORMAL
LEUKOCYTE ESTERASE UR QL STRIP.AUTO: NEGATIVE
LIPASE SERPL-CCNC: 9 U/L (ref 13–60)
LYMPHOCYTES # BLD AUTO: 0.58 10*3/MM3 (ref 0.7–3.1)
LYMPHOCYTES NFR BLD AUTO: 7.7 % (ref 19.6–45.3)
MCH RBC QN AUTO: 30.1 PG (ref 26.6–33)
MCHC RBC AUTO-ENTMCNC: 35.3 G/DL (ref 31.5–35.7)
MCV RBC AUTO: 85.1 FL (ref 79–97)
MONOCYTES # BLD AUTO: 0.35 10*3/MM3 (ref 0.1–0.9)
MONOCYTES NFR BLD AUTO: 4.7 % (ref 5–12)
NEUTROPHILS NFR BLD AUTO: 6.54 10*3/MM3 (ref 1.7–7)
NEUTROPHILS NFR BLD AUTO: 87.2 % (ref 42.7–76)
NITRITE UR QL STRIP: NEGATIVE
NRBC BLD AUTO-RTO: 0 /100 WBC (ref 0–0.2)
PH UR STRIP.AUTO: 5.5 [PH] (ref 5–8)
PLATELET # BLD AUTO: 252 10*3/MM3 (ref 140–450)
PMV BLD AUTO: 10.6 FL (ref 6–12)
POTASSIUM SERPL-SCNC: 3.4 MMOL/L (ref 3.5–5.2)
PROT SERPL-MCNC: 8.4 G/DL (ref 6–8.5)
PROT UR QL STRIP: ABNORMAL
QT INTERVAL: 366 MS
QTC INTERVAL: 459 MS
RBC # BLD AUTO: 5.29 10*6/MM3 (ref 4.14–5.8)
RBC # UR STRIP: ABNORMAL /HPF
REF LAB TEST METHOD: ABNORMAL
SODIUM SERPL-SCNC: 132 MMOL/L (ref 136–145)
SP GR UR STRIP: 1.05 (ref 1–1.03)
SQUAMOUS #/AREA URNS HPF: ABNORMAL /HPF
TROPONIN T SERPL HS-MCNC: 15 NG/L
UROBILINOGEN UR QL STRIP: ABNORMAL
WBC # UR STRIP: ABNORMAL /HPF
WBC NRBC COR # BLD AUTO: 7.5 10*3/MM3 (ref 3.4–10.8)
WHOLE BLOOD HOLD COAG: NORMAL
WHOLE BLOOD HOLD SPECIMEN: NORMAL

## 2024-03-28 PROCEDURE — 25010000002 ONDANSETRON PER 1 MG

## 2024-03-28 PROCEDURE — 96375 TX/PRO/DX INJ NEW DRUG ADDON: CPT

## 2024-03-28 PROCEDURE — 36415 COLL VENOUS BLD VENIPUNCTURE: CPT

## 2024-03-28 PROCEDURE — 25010000002 MORPHINE PER 10 MG: Performed by: STUDENT IN AN ORGANIZED HEALTH CARE EDUCATION/TRAINING PROGRAM

## 2024-03-28 PROCEDURE — 96374 THER/PROPH/DIAG INJ IV PUSH: CPT

## 2024-03-28 PROCEDURE — 93005 ELECTROCARDIOGRAM TRACING: CPT

## 2024-03-28 PROCEDURE — 93005 ELECTROCARDIOGRAM TRACING: CPT | Performed by: STUDENT IN AN ORGANIZED HEALTH CARE EDUCATION/TRAINING PROGRAM

## 2024-03-28 PROCEDURE — 25810000003 SODIUM CHLORIDE 0.9 % SOLUTION

## 2024-03-28 PROCEDURE — 25010000002 DROPERIDOL PER 5 MG

## 2024-03-28 PROCEDURE — 99284 EMERGENCY DEPT VISIT MOD MDM: CPT

## 2024-03-28 PROCEDURE — 71045 X-RAY EXAM CHEST 1 VIEW: CPT

## 2024-03-28 PROCEDURE — 63710000001 INSULIN REGULAR HUMAN PER 5 UNITS

## 2024-03-28 PROCEDURE — 85025 COMPLETE CBC W/AUTO DIFF WBC: CPT

## 2024-03-28 PROCEDURE — 83690 ASSAY OF LIPASE: CPT

## 2024-03-28 PROCEDURE — 81001 URINALYSIS AUTO W/SCOPE: CPT

## 2024-03-28 PROCEDURE — 82948 REAGENT STRIP/BLOOD GLUCOSE: CPT

## 2024-03-28 PROCEDURE — 84484 ASSAY OF TROPONIN QUANT: CPT

## 2024-03-28 PROCEDURE — 80053 COMPREHEN METABOLIC PANEL: CPT

## 2024-03-28 RX ORDER — ONDANSETRON 4 MG/1
4 TABLET, FILM COATED ORAL EVERY 8 HOURS PRN
Qty: 20 TABLET | Refills: 0 | Status: SHIPPED | OUTPATIENT
Start: 2024-03-28

## 2024-03-28 RX ORDER — MORPHINE SULFATE 4 MG/ML
4 INJECTION, SOLUTION INTRAMUSCULAR; INTRAVENOUS ONCE
Status: COMPLETED | OUTPATIENT
Start: 2024-03-28 | End: 2024-03-28

## 2024-03-28 RX ORDER — ONDANSETRON 2 MG/ML
INJECTION INTRAMUSCULAR; INTRAVENOUS
Status: COMPLETED
Start: 2024-03-28 | End: 2024-03-28

## 2024-03-28 RX ORDER — DROPERIDOL 2.5 MG/ML
2.5 INJECTION, SOLUTION INTRAMUSCULAR; INTRAVENOUS ONCE
Status: COMPLETED | OUTPATIENT
Start: 2024-03-28 | End: 2024-03-28

## 2024-03-28 RX ORDER — ONDANSETRON 2 MG/ML
4 INJECTION INTRAMUSCULAR; INTRAVENOUS ONCE
Status: COMPLETED | OUTPATIENT
Start: 2024-03-28 | End: 2024-03-28

## 2024-03-28 RX ORDER — SODIUM CHLORIDE 9 MG/ML
10 INJECTION, SOLUTION INTRAMUSCULAR; INTRAVENOUS; SUBCUTANEOUS AS NEEDED
Status: DISCONTINUED | OUTPATIENT
Start: 2024-03-28 | End: 2024-03-28 | Stop reason: HOSPADM

## 2024-03-28 RX ADMIN — ONDANSETRON 4 MG: 2 INJECTION INTRAMUSCULAR; INTRAVENOUS at 11:09

## 2024-03-28 RX ADMIN — DROPERIDOL 2.5 MG: 2.5 INJECTION, SOLUTION INTRAMUSCULAR; INTRAVENOUS at 13:56

## 2024-03-28 RX ADMIN — MORPHINE SULFATE 4 MG: 4 INJECTION, SOLUTION INTRAMUSCULAR; INTRAVENOUS at 14:08

## 2024-03-28 RX ADMIN — SODIUM CHLORIDE 1000 ML: 9 INJECTION, SOLUTION INTRAVENOUS at 13:56

## 2024-03-28 RX ADMIN — INSULIN HUMAN 8 UNITS: 100 INJECTION, SOLUTION PARENTERAL at 13:31

## 2024-03-28 RX ADMIN — SODIUM CHLORIDE 1000 ML: 9 INJECTION, SOLUTION INTRAVENOUS at 11:07

## 2024-03-28 RX ADMIN — SODIUM CHLORIDE 1000 ML: 9 INJECTION, SOLUTION INTRAVENOUS at 16:46

## 2024-03-28 NOTE — Clinical Note
[facility] EMERGENCY DEPARTMENT  [address]  [place], located within [facility] [address]  Phone: [phone]    Ford Mukherjee was seen and treated in our emergency department on 3/28/2024.  He may return to school on 03/30/2024.          Thank you for choosing [facility].    David Clemente MD

## 2024-03-28 NOTE — ED PROVIDER NOTES
"Subjective   History of Present Illness patient is a 29-year-old gentleman who presents emergency department complaint onset of nausea, vomiting this morning.  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    Review of Systems   Constitutional:  Positive for fatigue.   HENT: Negative.     Respiratory: Negative.     Cardiovascular:  Negative for chest pain.   Gastrointestinal:  Positive for nausea and vomiting. Negative for constipation and diarrhea.   Genitourinary: Negative.    Musculoskeletal: Negative.    Skin: Negative.    Neurological: Negative.        Past Medical History:   Diagnosis Date    Diabetes mellitus     PT DENIES THIS DX, PREVIOUS DOCUMENTED    GI (gastrointestinal bleed)        No Known Allergies    No past surgical history on file.    Family History   Problem Relation Age of Onset    Diabetes Mother     No Known Problems Father     Colon cancer Neg Hx     Esophageal cancer Neg Hx     Inflammatory bowel disease Neg Hx     Irritable bowel syndrome Neg Hx     Ulcerative colitis Neg Hx        Social History     Socioeconomic History    Marital status: Significant Other   Tobacco Use    Smoking status: Former    Smokeless tobacco: Never   Vaping Use    Vaping status: Never Used   Substance and Sexual Activity    Alcohol use: Yes     Comment: SOCIALLY    Drug use: No    Sexual activity: Yes     Partners: Female           Objective   Physical Exam  Constitutional:       Appearance: Normal appearance. He is ill-appearing.   HENT:      Head: Normocephalic and atraumatic.      Right Ear: External ear normal.      Left Ear: External ear normal.      Nose: Nose normal.      Mouth/Throat:      Mouth: Mucous membranes are moist.      Pharynx: Oropharynx is clear.   Eyes: "      Extraocular Movements: Extraocular movements intact.      Conjunctiva/sclera: Conjunctivae normal.      Pupils: Pupils are equal, round, and reactive to light.   Cardiovascular:      Rate and Rhythm: Regular rhythm. Tachycardia present.      Pulses: Normal pulses.   Pulmonary:      Effort: Pulmonary effort is normal.   Abdominal:      General: Abdomen is flat. Bowel sounds are normal.      Palpations: Abdomen is soft.      Tenderness: There is no abdominal tenderness.   Musculoskeletal:         General: Normal range of motion.      Cervical back: Normal range of motion.   Skin:     General: Skin is warm and dry.      Capillary Refill: Capillary refill takes less than 2 seconds.   Neurological:      General: No focal deficit present.      Mental Status: He is alert and oriented to person, place, and time.   Psychiatric:         Mood and Affect: Mood normal.         Behavior: Behavior normal.         Procedures           ED Course  ED Course as of 03/28/24 1833   Thu Mar 28, 2024   1111 Initially evaluated ED 33, accompanied by his brother. [JH]   1131 Noted patient CBC without acute abnormality. [JH]   1312 Reviewed patient serum chemistry result, demonstrating hyperglycemia, doubt significant acidosis, will administer subcu insulin and recheck for improvement. [JH]   1618 Evaluated the patient, who remains in sinus tachycardia, initially entering the room at 101 bpm, but rapidly increasing to 130 to 140 bpm with sitting upright for repeat evaluation.  Will administer another liter of IV fluid, as well as a p.o. challenge.  Will perform chest x-ray, EKG, cardiac troponin added to labs. [JH]   1635 Noted negative film image of the chest. [JH]   1723 Waiting repeat glucose, and EKG completion.  Troponin analysis is negative. [JH]   1723 This time patient's cardiac rhythm on monitor is 104 bpm and sinus tachycardia. [JH]   1827 Glucose is now improved under 100.  Send normal sinus rhythm at 99 bpm.  Discharge plan  will be confirmed. [JH]   1832 His heart rate now at 93 bpm normal sinus rhythm.  Communicated that his prescriptions have been refilled and sent to the preferred pharmacy.  He is agreeable to follow-up if he has new or concerning symptoms. [JH]      ED Course User Index  [JH] Yovany Wilkerson, IKE                                             Medical Decision Making  The patient's report of symptoms, onset, pertinent history, as well as social occupational challenges lately, differential diagnosis includes hyperglycemia, diabetic ketoacidosis, electrolyte derangement, gastrointestinal viral syndrome, nausea and vomiting, dehydration, cannot exclude pancreatitis, cholecystitis, appendicitis.  Patient was serum screen labs, IV crystalloid infusion, antiemetic medication, twelve-lead ECG.  Patient will be reevaluated, with results communicated disposition considered.  Patient be given a work excuse as well as a school excuse for his absence and recent illness.    Risk  Prescription drug management.        Final diagnoses:   Nausea and vomiting, unspecified vomiting type   Hyperglycemia       ED Disposition  ED Disposition       ED Disposition   Discharge    Condition   Stable    Comment   --               PATIENT CONNECTION - [place] [address]  [phone]  Call       Shalini Goodson APRN  [address]  [address]  [place] [address]  [phone]    Call            Medication List        New Prescriptions      ondansetron 4 MG tablet  Commonly known as: ZOFRAN  Take 1 tablet by mouth Every 8 (Eight) Hours As Needed for Nausea.               Where to Get Your Medications        These medications were sent to [id number] - [place], [place] - [address]  AT [facility] - [phone]  - [phone]   [address] [facility] [address]      Phone: [phone]   insulin detemir 100 UNIT/ML injection  ondansetron 4 MG tablet             Yovany Wilkerson, APRN  03/28/24 2206

## 2024-03-28 NOTE — Clinical Note
[facility] EMERGENCY DEPARTMENT  [address]  [facility] [address]  Phone: [phone]    Ford Mukherjee was seen and treated in our emergency department on 3/28/2024.  He may return to work on 04/01/2024.         Thank you for choosing [facility].    David Clemente MD

## 2024-03-28 NOTE — DISCHARGE INSTRUCTIONS
Return to the emergency department for new or concerning symptoms.  Please follow-up with your primary care provider regarding managing your blood sugar.

## 2024-03-29 LAB
QT INTERVAL: 352 MS
QTC INTERVAL: 480 MS

## 2024-04-02 LAB
QT INTERVAL: 352 MS
QT INTERVAL: 366 MS
QTC INTERVAL: 459 MS
QTC INTERVAL: 480 MS

## 2024-06-03 ENCOUNTER — HOSPITAL ENCOUNTER (EMERGENCY)
Facility: HOSPITAL | Age: 29
Discharge: HOME | End: 2024-06-03
Attending: EMERGENCY MEDICINE | Admitting: EMERGENCY MEDICINE
Payer: MEDICAID

## 2024-06-03 VITALS
WEIGHT: 150 LBS | DIASTOLIC BLOOD PRESSURE: 100 MMHG | OXYGEN SATURATION: 97 % | SYSTOLIC BLOOD PRESSURE: 150 MMHG | RESPIRATION RATE: 18 BRPM | TEMPERATURE: 98.8 F | HEART RATE: 97 BPM | BODY MASS INDEX: 21.47 KG/M2 | HEIGHT: 70 IN

## 2024-06-03 DIAGNOSIS — E10.65 HYPERGLYCEMIA DUE TO TYPE 1 DIABETES MELLITUS: ICD-10-CM

## 2024-06-03 DIAGNOSIS — E86.0 DEHYDRATION: ICD-10-CM

## 2024-06-03 DIAGNOSIS — E11.65 TYPE 2 DIABETES MELLITUS WITH HYPERGLYCEMIA, WITH LONG-TERM CURRENT USE OF INSULIN: ICD-10-CM

## 2024-06-03 DIAGNOSIS — Z79.4 TYPE 2 DIABETES MELLITUS WITH HYPERGLYCEMIA, WITH LONG-TERM CURRENT USE OF INSULIN: ICD-10-CM

## 2024-06-03 DIAGNOSIS — R11.2 NAUSEA AND VOMITING, UNSPECIFIED VOMITING TYPE: Primary | ICD-10-CM

## 2024-06-03 LAB
ALBUMIN SERPL-MCNC: 5.1 G/DL (ref 3.5–5.2)
ALBUMIN/GLOB SERPL: 2 G/DL
ALP SERPL-CCNC: 76 U/L (ref 39–117)
ALT SERPL W P-5'-P-CCNC: 21 U/L (ref 1–41)
ANION GAP SERPL CALCULATED.3IONS-SCNC: 16 MMOL/L (ref 5–15)
AST SERPL-CCNC: 13 U/L (ref 1–40)
B-OH-BUTYR SERPL-SCNC: 0.66 MMOL/L (ref 0.02–0.27)
BACTERIA UR QL AUTO: ABNORMAL /HPF
BASOPHILS # BLD AUTO: 0.01 10*3/MM3 (ref 0–0.2)
BASOPHILS NFR BLD AUTO: 0.1 % (ref 0–1.5)
BILIRUB SERPL-MCNC: 0.6 MG/DL (ref 0–1.2)
BILIRUB UR QL STRIP: NEGATIVE
BUN SERPL-MCNC: 10 MG/DL (ref 6–20)
BUN/CREAT SERPL: 13.5 (ref 7–25)
CALCIUM SPEC-SCNC: 10 MG/DL (ref 8.6–10.5)
CHLORIDE SERPL-SCNC: 95 MMOL/L (ref 98–107)
CLARITY UR: CLEAR
CO2 SERPL-SCNC: 24 MMOL/L (ref 22–29)
COLOR UR: YELLOW
CREAT SERPL-MCNC: 0.74 MG/DL (ref 0.76–1.27)
DEPRECATED RDW RBC AUTO: 38.4 FL (ref 37–54)
EGFRCR SERPLBLD CKD-EPI 2021: 126.6 ML/MIN/1.73
EOSINOPHIL # BLD AUTO: 0 10*3/MM3 (ref 0–0.4)
EOSINOPHIL NFR BLD AUTO: 0 % (ref 0.3–6.2)
ERYTHROCYTE [DISTWIDTH] IN BLOOD BY AUTOMATED COUNT: 12.6 % (ref 12.3–15.4)
GLOBULIN UR ELPH-MCNC: 2.5 GM/DL
GLUCOSE BLDC GLUCOMTR-MCNC: 246 MG/DL (ref 70–130)
GLUCOSE SERPL-MCNC: 263 MG/DL (ref 65–99)
GLUCOSE UR STRIP-MCNC: ABNORMAL MG/DL
HCT VFR BLD AUTO: 41.7 % (ref 37.5–51)
HGB BLD-MCNC: 14.9 G/DL (ref 13–17.7)
HGB UR QL STRIP.AUTO: ABNORMAL
HOLD SPECIMEN: NORMAL
HYALINE CASTS UR QL AUTO: ABNORMAL /LPF
IMM GRANULOCYTES # BLD AUTO: 0.03 10*3/MM3 (ref 0–0.05)
IMM GRANULOCYTES NFR BLD AUTO: 0.3 % (ref 0–0.5)
KETONES UR QL STRIP: ABNORMAL
LEUKOCYTE ESTERASE UR QL STRIP.AUTO: NEGATIVE
LIPASE SERPL-CCNC: 25 U/L (ref 13–60)
LYMPHOCYTES # BLD AUTO: 0.3 10*3/MM3 (ref 0.7–3.1)
LYMPHOCYTES NFR BLD AUTO: 2.8 % (ref 19.6–45.3)
MAGNESIUM SERPL-MCNC: 1.4 MG/DL (ref 1.6–2.6)
MCH RBC QN AUTO: 30.3 PG (ref 26.6–33)
MCHC RBC AUTO-ENTMCNC: 35.7 G/DL (ref 31.5–35.7)
MCV RBC AUTO: 84.8 FL (ref 79–97)
MONOCYTES # BLD AUTO: 0.16 10*3/MM3 (ref 0.1–0.9)
MONOCYTES NFR BLD AUTO: 1.5 % (ref 5–12)
NEUTROPHILS NFR BLD AUTO: 10.12 10*3/MM3 (ref 1.7–7)
NEUTROPHILS NFR BLD AUTO: 95.3 % (ref 42.7–76)
NITRITE UR QL STRIP: NEGATIVE
NRBC BLD AUTO-RTO: 0 /100 WBC (ref 0–0.2)
PH UR STRIP.AUTO: 6 [PH] (ref 5–8)
PLATELET # BLD AUTO: 265 10*3/MM3 (ref 140–450)
PMV BLD AUTO: 10.4 FL (ref 6–12)
POTASSIUM SERPL-SCNC: 3.9 MMOL/L (ref 3.5–5.2)
PROT SERPL-MCNC: 7.6 G/DL (ref 6–8.5)
PROT UR QL STRIP: ABNORMAL
RBC # BLD AUTO: 4.92 10*6/MM3 (ref 4.14–5.8)
RBC # UR STRIP: ABNORMAL /HPF
REF LAB TEST METHOD: ABNORMAL
SODIUM SERPL-SCNC: 135 MMOL/L (ref 136–145)
SP GR UR STRIP: 1.05 (ref 1–1.03)
SQUAMOUS #/AREA URNS HPF: ABNORMAL /HPF
UROBILINOGEN UR QL STRIP: ABNORMAL
WBC # UR STRIP: ABNORMAL /HPF
WBC NRBC COR # BLD AUTO: 10.62 10*3/MM3 (ref 3.4–10.8)
WHOLE BLOOD HOLD COAG: NORMAL
WHOLE BLOOD HOLD SPECIMEN: NORMAL

## 2024-06-03 PROCEDURE — 25010000002 ONDANSETRON PER 1 MG: Performed by: EMERGENCY MEDICINE

## 2024-06-03 PROCEDURE — 99283 EMERGENCY DEPT VISIT LOW MDM: CPT

## 2024-06-03 PROCEDURE — 83690 ASSAY OF LIPASE: CPT | Performed by: EMERGENCY MEDICINE

## 2024-06-03 PROCEDURE — 25810000003 LACTATED RINGERS SOLUTION: Performed by: EMERGENCY MEDICINE

## 2024-06-03 PROCEDURE — 96374 THER/PROPH/DIAG INJ IV PUSH: CPT

## 2024-06-03 PROCEDURE — 81001 URINALYSIS AUTO W/SCOPE: CPT | Performed by: EMERGENCY MEDICINE

## 2024-06-03 PROCEDURE — 82948 REAGENT STRIP/BLOOD GLUCOSE: CPT

## 2024-06-03 PROCEDURE — 82010 KETONE BODYS QUAN: CPT | Performed by: EMERGENCY MEDICINE

## 2024-06-03 PROCEDURE — 83735 ASSAY OF MAGNESIUM: CPT | Performed by: EMERGENCY MEDICINE

## 2024-06-03 PROCEDURE — 96375 TX/PRO/DX INJ NEW DRUG ADDON: CPT

## 2024-06-03 PROCEDURE — 85025 COMPLETE CBC W/AUTO DIFF WBC: CPT | Performed by: EMERGENCY MEDICINE

## 2024-06-03 PROCEDURE — 80053 COMPREHEN METABOLIC PANEL: CPT | Performed by: EMERGENCY MEDICINE

## 2024-06-03 RX ORDER — INSULIN DETEMIR 100 [IU]/ML
15 INJECTION, SOLUTION SUBCUTANEOUS DAILY
Qty: 6 ML | Refills: 0 | Status: SHIPPED | OUTPATIENT
Start: 2024-06-03

## 2024-06-03 RX ORDER — ONDANSETRON 2 MG/ML
4 INJECTION INTRAMUSCULAR; INTRAVENOUS ONCE
Status: COMPLETED | OUTPATIENT
Start: 2024-06-03 | End: 2024-06-03

## 2024-06-03 RX ORDER — FAMOTIDINE 10 MG/ML
20 INJECTION, SOLUTION INTRAVENOUS ONCE
Status: COMPLETED | OUTPATIENT
Start: 2024-06-03 | End: 2024-06-03

## 2024-06-03 RX ORDER — SODIUM CHLORIDE 9 MG/ML
10 INJECTION, SOLUTION INTRAMUSCULAR; INTRAVENOUS; SUBCUTANEOUS AS NEEDED
Status: DISCONTINUED | OUTPATIENT
Start: 2024-06-03 | End: 2024-06-04 | Stop reason: HOSPADM

## 2024-06-03 RX ADMIN — FAMOTIDINE 20 MG: 10 INJECTION, SOLUTION INTRAVENOUS at 20:39

## 2024-06-03 RX ADMIN — SODIUM CHLORIDE, POTASSIUM CHLORIDE, SODIUM LACTATE AND CALCIUM CHLORIDE 2000 ML: 600; 310; 30; 20 INJECTION, SOLUTION INTRAVENOUS at 20:39

## 2024-06-03 RX ADMIN — ONDANSETRON 4 MG: 2 INJECTION INTRAMUSCULAR; INTRAVENOUS at 20:39

## 2024-06-03 NOTE — ED PROVIDER NOTES
Subjective   History of Present Illness    Pt presents with nausea and vomiting that began this morning.  No diarrhea, abdominal pain or fever.  No URI symptoms.  The last few times it has been blood tinged.      He has IDDM.  He does not check his sugars.    History provided by:  Patient      Review of Systems   Constitutional:  Negative for fever.   Respiratory:  Negative for cough and shortness of breath.    Gastrointestinal:  Positive for nausea and vomiting. Negative for abdominal pain and diarrhea.   All other systems reviewed and are negative.      Past Medical History:   Diagnosis Date    Diabetes mellitus     PT DENIES THIS DX, PREVIOUS DOCUMENTED    GI (gastrointestinal bleed)        No Known Allergies    No past surgical history on file.    Family History   Problem Relation Age of Onset    Diabetes Mother     No Known Problems Father     Colon cancer Neg Hx     Esophageal cancer Neg Hx     Inflammatory bowel disease Neg Hx     Irritable bowel syndrome Neg Hx     Ulcerative colitis Neg Hx        Social History     Socioeconomic History    Marital status: Significant Other   Tobacco Use    Smoking status: Former    Smokeless tobacco: Never   Vaping Use    Vaping status: Never Used   Substance and Sexual Activity    Alcohol use: Yes     Comment: SOCIALLY    Drug use: No    Sexual activity: Yes     Partners: Female           Objective   Physical Exam  Vitals and nursing note reviewed.   Constitutional:       General: He is not in acute distress.     Appearance: Normal appearance. He is not ill-appearing.   HENT:      Head: Normocephalic and atraumatic.      Mouth/Throat:      Mouth: Mucous membranes are moist.   Eyes:      General: No scleral icterus.        Right eye: No discharge.         Left eye: No discharge.      Conjunctiva/sclera: Conjunctivae normal.   Cardiovascular:      Rate and Rhythm: Normal rate and regular rhythm.      Heart sounds: No murmur heard.  Pulmonary:      Effort: Pulmonary effort is  normal. No respiratory distress.      Breath sounds: Normal breath sounds. No wheezing.   Abdominal:      General: Bowel sounds are normal. There is no distension.      Palpations: Abdomen is soft.      Tenderness: There is no abdominal tenderness. There is no guarding or rebound.   Musculoskeletal:         General: No swelling. Normal range of motion.      Cervical back: Normal range of motion and neck supple.   Skin:     General: Skin is warm and dry.      Findings: No rash.   Neurological:      General: No focal deficit present.      Mental Status: He is alert and oriented to person, place, and time. Mental status is at baseline.   Psychiatric:         Mood and Affect: Mood normal.         Behavior: Behavior normal.         Thought Content: Thought content normal.         Procedures           ED Course    Some ketones but no acidosis.  Sugar is 200s, his recent A1c of 10.1 on 5/7/23 and 7.90 on 9/19/23 suggests he is not far from his usual glucose levels.    Meds and fluids given and he feels much better.  I believe he is safe for outpatient care.  He asked for refill of his insulin which I provided.                                         Medical Decision Making  Problems Addressed:  Dehydration: complicated acute illness or injury with systemic symptoms  Hyperglycemia due to type 1 diabetes mellitus: chronic illness or injury that poses a threat to life or bodily functions  Nausea and vomiting, unspecified vomiting type: complicated acute illness or injury    Amount and/or Complexity of Data Reviewed  External Data Reviewed: labs.  Labs: ordered. Decision-making details documented in ED Course.    Risk  Prescription drug management.  Decision regarding hospitalization.        Final diagnoses:   Nausea and vomiting, unspecified vomiting type   Dehydration   Hyperglycemia due to type 1 diabetes mellitus       ED Disposition  ED Disposition       ED Disposition   Discharge    Condition   Stable    Comment   --                Your doctor    In 1 week           Medication List        New Prescriptions      ondansetron ODT 4 MG disintegrating tablet  Commonly known as: ZOFRAN-ODT  Place 1 tablet on the tongue Every 6 (Six) Hours As Needed for Nausea or Vomiting.            Changed      Levemir FlexPen 100 UNIT/ML injection  Generic drug: insulin detemir  Inject 15 Units under the skin into the appropriate area as directed Daily.  What changed:   how much to take  when to take this               Where to Get Your Medications        These medications were sent to [facility] #[id number] - [place], [place] - [address] [place] [address]  AT [facility] - [phone]  - [phone]   [address] [facility] [address]      Phone: [phone]   Levemir FlexPen 100 UNIT/ML injection  ondansetron ODT 4 MG disintegrating tablet            Ugo Croft MD  06/04/24 2664

## 2024-06-04 RX ORDER — ONDANSETRON 4 MG/1
4 TABLET, ORALLY DISINTEGRATING ORAL EVERY 6 HOURS PRN
Qty: 15 TABLET | Refills: 0 | Status: SHIPPED | OUTPATIENT
Start: 2024-06-04

## 2025-02-05 ENCOUNTER — LAB (OUTPATIENT)
Dept: LAB | Facility: HOSPITAL | Age: 30
End: 2025-02-05
Payer: MEDICAID

## 2025-02-05 ENCOUNTER — TELEPHONE (OUTPATIENT)
Dept: INTERNAL MEDICINE | Facility: CLINIC | Age: 30
End: 2025-02-05

## 2025-02-05 ENCOUNTER — PRIOR AUTHORIZATION (OUTPATIENT)
Dept: INTERNAL MEDICINE | Facility: CLINIC | Age: 30
End: 2025-02-05

## 2025-02-05 ENCOUNTER — OFFICE VISIT (OUTPATIENT)
Dept: INTERNAL MEDICINE | Facility: CLINIC | Age: 30
End: 2025-02-05
Payer: MEDICAID

## 2025-02-05 VITALS
BODY MASS INDEX: 22.85 KG/M2 | SYSTOLIC BLOOD PRESSURE: 136 MMHG | OXYGEN SATURATION: 98 % | DIASTOLIC BLOOD PRESSURE: 92 MMHG | WEIGHT: 159.6 LBS | HEIGHT: 70 IN | HEART RATE: 84 BPM

## 2025-02-05 DIAGNOSIS — Z79.4 TYPE 2 DIABETES MELLITUS WITH HYPERGLYCEMIA, WITH LONG-TERM CURRENT USE OF INSULIN: ICD-10-CM

## 2025-02-05 DIAGNOSIS — R03.0 ELEVATED BLOOD PRESSURE READING: ICD-10-CM

## 2025-02-05 DIAGNOSIS — R21 RASH: ICD-10-CM

## 2025-02-05 DIAGNOSIS — E11.65 TYPE 2 DIABETES MELLITUS WITH HYPERGLYCEMIA, WITHOUT LONG-TERM CURRENT USE OF INSULIN: ICD-10-CM

## 2025-02-05 DIAGNOSIS — Z76.89 ENCOUNTER TO ESTABLISH CARE WITH NEW DOCTOR: Primary | ICD-10-CM

## 2025-02-05 DIAGNOSIS — Z76.89 ENCOUNTER TO ESTABLISH CARE WITH NEW DOCTOR: ICD-10-CM

## 2025-02-05 DIAGNOSIS — K76.0 HEPATIC STEATOSIS: ICD-10-CM

## 2025-02-05 DIAGNOSIS — E11.65 TYPE 2 DIABETES MELLITUS WITH HYPERGLYCEMIA, WITH LONG-TERM CURRENT USE OF INSULIN: ICD-10-CM

## 2025-02-05 DIAGNOSIS — E11.65 TYPE 2 DIABETES MELLITUS WITH HYPERGLYCEMIA, WITHOUT LONG-TERM CURRENT USE OF INSULIN: Primary | ICD-10-CM

## 2025-02-05 PROBLEM — K92.0 COFFEE GROUND EMESIS: Status: RESOLVED | Noted: 2021-09-18 | Resolved: 2025-02-05

## 2025-02-05 PROBLEM — Z91.199 MEDICALLY NONCOMPLIANT: Status: RESOLVED | Noted: 2017-02-12 | Resolved: 2025-02-05

## 2025-02-05 PROBLEM — K92.2 ACUTE UPPER GI BLEEDING: Status: RESOLVED | Noted: 2021-09-18 | Resolved: 2025-02-05

## 2025-02-05 PROBLEM — E10.10 DIABETIC KETOACIDOSIS WITHOUT COMA ASSOCIATED WITH TYPE 1 DIABETES MELLITUS: Status: RESOLVED | Noted: 2023-03-09 | Resolved: 2025-02-05

## 2025-02-05 PROBLEM — N30.00 ACUTE CYSTITIS WITHOUT HEMATURIA: Status: RESOLVED | Noted: 2017-02-12 | Resolved: 2025-02-05

## 2025-02-05 PROBLEM — U07.1 ASYMPTOMATIC COVID-19 VIRUS INFECTION: Status: RESOLVED | Noted: 2021-09-19 | Resolved: 2025-02-05

## 2025-02-05 LAB
25(OH)D3 SERPL-MCNC: 15.9 NG/ML (ref 30–100)
ALBUMIN SERPL-MCNC: 4.8 G/DL (ref 3.5–5.2)
ALBUMIN/GLOB SERPL: 1.9 G/DL
ALP SERPL-CCNC: 73 U/L (ref 39–117)
ALT SERPL W P-5'-P-CCNC: 21 U/L (ref 1–41)
ANION GAP SERPL CALCULATED.3IONS-SCNC: 11 MMOL/L (ref 5–15)
AST SERPL-CCNC: 17 U/L (ref 1–40)
BILIRUB SERPL-MCNC: 0.3 MG/DL (ref 0–1.2)
BUN SERPL-MCNC: 16 MG/DL (ref 6–20)
BUN/CREAT SERPL: 21.9 (ref 7–25)
CALCIUM SPEC-SCNC: 9.4 MG/DL (ref 8.6–10.5)
CHLORIDE SERPL-SCNC: 101 MMOL/L (ref 98–107)
CHOLEST SERPL-MCNC: 182 MG/DL (ref 0–200)
CO2 SERPL-SCNC: 27 MMOL/L (ref 22–29)
CREAT SERPL-MCNC: 0.73 MG/DL (ref 0.76–1.27)
EGFRCR SERPLBLD CKD-EPI 2021: 127.1 ML/MIN/1.73
EXPIRATION DATE: ABNORMAL
GLOBULIN UR ELPH-MCNC: 2.5 GM/DL
GLUCOSE SERPL-MCNC: 220 MG/DL (ref 65–99)
HBA1C MFR BLD: 11.6 % (ref 4.5–5.7)
HDLC SERPL-MCNC: 43 MG/DL (ref 40–60)
LDLC SERPL CALC-MCNC: 119 MG/DL (ref 0–100)
LDLC/HDLC SERPL: 2.73 {RATIO}
Lab: ABNORMAL
POTASSIUM SERPL-SCNC: 4.1 MMOL/L (ref 3.5–5.2)
PROT SERPL-MCNC: 7.3 G/DL (ref 6–8.5)
SODIUM SERPL-SCNC: 139 MMOL/L (ref 136–145)
TRIGL SERPL-MCNC: 108 MG/DL (ref 0–150)
VLDLC SERPL-MCNC: 20 MG/DL (ref 5–40)

## 2025-02-05 PROCEDURE — 82306 VITAMIN D 25 HYDROXY: CPT

## 2025-02-05 PROCEDURE — 99417 PROLNG OP E/M EACH 15 MIN: CPT | Performed by: STUDENT IN AN ORGANIZED HEALTH CARE EDUCATION/TRAINING PROGRAM

## 2025-02-05 PROCEDURE — 1126F AMNT PAIN NOTED NONE PRSNT: CPT | Performed by: STUDENT IN AN ORGANIZED HEALTH CARE EDUCATION/TRAINING PROGRAM

## 2025-02-05 PROCEDURE — 83036 HEMOGLOBIN GLYCOSYLATED A1C: CPT | Performed by: STUDENT IN AN ORGANIZED HEALTH CARE EDUCATION/TRAINING PROGRAM

## 2025-02-05 PROCEDURE — 3080F DIAST BP >= 90 MM HG: CPT | Performed by: STUDENT IN AN ORGANIZED HEALTH CARE EDUCATION/TRAINING PROGRAM

## 2025-02-05 PROCEDURE — 99215 OFFICE O/P EST HI 40 MIN: CPT | Performed by: STUDENT IN AN ORGANIZED HEALTH CARE EDUCATION/TRAINING PROGRAM

## 2025-02-05 PROCEDURE — 80061 LIPID PANEL: CPT

## 2025-02-05 PROCEDURE — 3075F SYST BP GE 130 - 139MM HG: CPT | Performed by: STUDENT IN AN ORGANIZED HEALTH CARE EDUCATION/TRAINING PROGRAM

## 2025-02-05 PROCEDURE — 3046F HEMOGLOBIN A1C LEVEL >9.0%: CPT | Performed by: STUDENT IN AN ORGANIZED HEALTH CARE EDUCATION/TRAINING PROGRAM

## 2025-02-05 PROCEDURE — 80053 COMPREHEN METABOLIC PANEL: CPT

## 2025-02-05 PROCEDURE — 36415 COLL VENOUS BLD VENIPUNCTURE: CPT

## 2025-02-05 RX ORDER — PEN NEEDLE, DIABETIC 31 GX5/16"
1 NEEDLE, DISPOSABLE MISCELLANEOUS DAILY
Qty: 100 EACH | Refills: 3 | Status: SHIPPED | OUTPATIENT
Start: 2025-02-05

## 2025-02-05 RX ORDER — FLURBIPROFEN SODIUM 0.3 MG/ML
1 SOLUTION/ DROPS OPHTHALMIC NIGHTLY
Qty: 30 EACH | Refills: 0 | Status: SHIPPED | OUTPATIENT
Start: 2025-02-05

## 2025-02-05 RX ORDER — METFORMIN HYDROCHLORIDE 500 MG/1
500 TABLET, EXTENDED RELEASE ORAL 2 TIMES DAILY
Qty: 30 TABLET | Refills: 2 | Status: SHIPPED | OUTPATIENT
Start: 2025-02-05 | End: 2025-05-06

## 2025-02-05 RX ORDER — BLOOD-GLUCOSE METER
1 KIT MISCELLANEOUS
Qty: 1 EACH | Refills: 0 | Status: SHIPPED | OUTPATIENT
Start: 2025-02-05

## 2025-02-05 RX ORDER — METFORMIN HYDROCHLORIDE 500 MG/1
500 TABLET, EXTENDED RELEASE ORAL
Qty: 30 TABLET | Refills: 2 | Status: SHIPPED | OUTPATIENT
Start: 2025-02-05 | End: 2025-02-05

## 2025-02-05 NOTE — TELEPHONE ENCOUNTER
PA denied. Patient needs to try and fail 2 of the formulary medications; Byetta, Ozempic, Trulicity, or Victozia     Ok to send in a covered medication?

## 2025-02-05 NOTE — PROGRESS NOTES
"       New Patient Office Visit      Date: 2025      Patient Name: Ford Mukherjee Jr.  : 1995   MRN: [id number]   Care Team: Patient Care Team:  Rajan Mayberry MD as PCP - General (Internal Medicine)  Rich Peralta MD (Family Medicine)     Chief Complaint:    Chief Complaint   Patient presents with   • Primary Care Follow-Up     Check A1C. Needs refills       History of Present Illness: Ford Mukherjee Jr. is a 29 y.o. male who presents to clinic today to establish care.  He has a medical history of insulin dependent type II DM, alcohol abuse ( in remission). Patient wants some labs and medication refills.  He has not had any outpatient follow-up for diabetes for a  \"while\" per his report, he was hospitalized in 2023  for tachycardia secondary to volume depletion likely due to nausea and vomiting, blood pressure was also elevated at that visit. He had other ED visits for nausea and vomiting since then, most recent from 2024.  Patient states he has been in [place] for work, has returned now and unlikely to go back. Been out of insulin and metformin refills since Dec 2024.      Past Medical History  - IDDM : diagnosed 2018  -Hepatic steatosis  -Diverticulosis seen on imaging  -Cholelithiasis    Past Surgical History  - none    Family History  - diabetes: mother ( also had to be transplanted, renal )  - hypertension: mother  - no family history of MI, CVA    Social History  - does not smoke cigarette or vape, stopped drinking since hospital admission for coffee ground emesis following alcohol use in , no current illicit drug use ( formerly used marijuana)    Allergies  - NKDA    Medications.   - Levemir  15 units daily  - metformin 1 g daily    Health maintenance:  - Never had a diabetic eye check that he can recall, visits have been for refractive errors per patient: Patient states he will make appointment with optometrist in about 2 weeks, awaiting insurance card  - " counseled regarding pneumovax which he wants to defer to the next visit    Subjective     Review of System: Review of Systems   Constitutional:  Negative for chills and fever.   Eyes:  Negative for visual disturbance.   Respiratory:  Negative for shortness of breath.    Cardiovascular:  Negative for chest pain and palpitations.   Gastrointestinal:  Negative for blood in stool.   Endocrine: Positive for polydipsia and polyuria.   Musculoskeletal:  Negative for arthralgias and myalgias.   Skin:  Positive for rash.   Neurological:  Negative for seizures and headaches.        Past Medical History:   Past Medical History:   Diagnosis Date   • Diabetes mellitus     PT DENIES THIS DX, PREVIOUS DOCUMENTED   • GI (gastrointestinal bleed)        Past Surgical History: History reviewed. No pertinent surgical history.    Family History:   Family History   Problem Relation Age of Onset   • Diabetes Mother    • No Known Problems Father    • Colon cancer Neg Hx    • Esophageal cancer Neg Hx    • Inflammatory bowel disease Neg Hx    • Irritable bowel syndrome Neg Hx    • Ulcerative colitis Neg Hx        Social History:   Social History     Socioeconomic History   • Marital status: Significant Other   Tobacco Use   • Smoking status: Never   • Smokeless tobacco: Never   Vaping Use   • Vaping status: Never Used   Substance and Sexual Activity   • Alcohol use: Yes     Comment: SOCIALLY   • Drug use: No   • Sexual activity: Yes     Partners: Female       Medications:     Current Outpatient Medications:   •  Insulin Pen Needle (B-D UF III MINI PEN NEEDLES) 31G X 5 MM misc, Use 1 pen Every Night., Disp: 30 each, Rfl: 0  •  metFORMIN ER (GLUCOPHAGE-XR) 500 MG 24 hr tablet, Take 1 tablet by mouth 2 (Two) Times a Day for 90 days., Disp: 30 tablet, Rfl: 2  •  Alcohol Swabs (Alcohol Prep) pads, Use 1 each Daily., Disp: 100 each, Rfl: 3  •  Alcohol Swabs pads, Use 1 swab Daily. (Patient not taking: Reported on 2/5/2025), Disp: 100 each, Rfl:  "0  •  Blood Glucose Monitoring Suppl w/Device kit, Check BG one time per day. (Patient not taking: Reported on 2/5/2025), Disp: 1 each, Rfl: 0  •  glucose blood test strip, Check BG one time per day. (Patient not taking: Reported on 2/5/2025), Disp: 100 each, Rfl: 0  •  glucose blood test strip, Use as instructed, Disp: 100 each, Rfl: 3  •  glucose monitor monitoring kit, Use 1 each Every Morning Before Breakfast., Disp: 1 each, Rfl: 0  •  hyoscyamine (LEVSIN) 0.125 MG SL tablet, Take 1 tablet by mouth Every 4 (Four) Hours As Needed for Cramping. (Patient not taking: Reported on 2/5/2025), Disp: 20 tablet, Rfl: 0  •  Insulin Glargine (LANTUS SOLOSTAR) 100 UNIT/ML injection pen, Inject 15 Units under the skin into the appropriate area as directed Every Night for 180 days., Disp: 27 mL, Rfl: 0  •  Lancet Devices [facility], Use 1 each Daily., Disp: 100 each, Rfl: 3  •  Lancets (freestyle) lancets, Check BG once per day. (Patient not taking: Reported on 2/5/2025), Disp: 100 each, Rfl: 0  •  lisinopril (PRINIVIL,ZESTRIL) 10 MG tablet, Take 1 tablet by mouth Daily. (Patient not taking: Reported on 2/5/2025), Disp: 30 tablet, Rfl: 0  •  ondansetron (ZOFRAN) 4 MG tablet, Take 1 tablet by mouth Every 8 (Eight) Hours As Needed for Nausea. (Patient not taking: Reported on 2/5/2025), Disp: 20 tablet, Rfl: 0  •  ondansetron ODT (ZOFRAN-ODT) 4 MG disintegrating tablet, Place 1 tablet on the tongue Every 6 (Six) Hours As Needed for Nausea or Vomiting. (Patient not taking: Reported on 2/5/2025), Disp: 15 tablet, Rfl: 0  •  Tirzepatide 2.5 MG/0.5ML solution auto-injector, Inject 2.5 mg under the skin into the appropriate area as directed 1 (One) Time Per Week for 28 days., Disp: 4 mL, Rfl: 0    Allergies:   No Known Allergies    Objective     Physical Exam:   Vital Signs:   Vitals:    02/05/25 0908   BP: 136/92   Pulse: 84   SpO2: 98%   Weight: 72.4 kg (159 lb 9.6 oz)   Height: 177.8 cm (70\")   PainSc: 0-No pain     Body mass index is " 22.9 kg/m².     Physical Exam  Vitals and nursing note reviewed.   Constitutional:       Appearance: Normal appearance.   HENT:      Head: Normocephalic and atraumatic.      Mouth/Throat:      Mouth: Mucous membranes are moist.      Pharynx: Oropharynx is clear.   Eyes:      Extraocular Movements: Extraocular movements intact.      Pupils: Pupils are equal, round, and reactive to light.   Neck:      Thyroid: No thyroid mass or thyroid tenderness.   Cardiovascular:      Rate and Rhythm: Normal rate and regular rhythm.      Pulses: Normal pulses.      Heart sounds: Normal heart sounds.   Pulmonary:      Effort: Pulmonary effort is normal.      Breath sounds: Normal breath sounds.   Abdominal:      General: Abdomen is flat. Bowel sounds are normal.      Palpations: Abdomen is soft.   Musculoskeletal:         General: Normal range of motion.      Cervical back: Normal range of motion.        Legs:       Comments: Hyperpigmented patch around the marked area, non-tender, does not jorge a with pressure   Skin:     General: Skin is warm.   Neurological:      Mental Status: He is alert and oriented to person, place, and time.   Psychiatric:         Mood and Affect: Mood normal.         Behavior: Behavior normal.         Diabetic foot exam:   Left: Filament test present   Pulses Dorsalis Pedis:  present  Posterior Tibial:  present   Reflexes 2+    Vibratory sensation normal   Proprioception normal   Sharp/dull discrimination normal       Right: Filament test present   Pulses Dorsalis Pedis:  present  Posterior Tibial:  present   Reflexes 2+    Vibratory sensation normal   Proprioception normal   Sharp/dull discrimination normal     Diabetic Foot Exam Performed and Monofilament Test Performed  Procedures    Results for orders placed or performed in visit on 02/05/25   POC Glycosylated Hemoglobin (Hb A1C)    Collection Time: 02/05/25  9:23 AM    Specimen: Blood   Result Value Ref Range    Hemoglobin A1C 11.6 (A) 4.5 - 5.7 %     Lot Number [id number]     Expiration Date 2026-10-18           Assessment / Plan      Assessment/Plan:   Diagnoses and all orders for this visit:    1. Encounter to establish care with new doctor (Primary)  -     CBC Auto Differential; Future  -     Vitamin D,25-Hydroxy; Future    2. Type 2 diabetes mellitus with hyperglycemia, without long-term current use of insulin  -     POC Glycosylated Hemoglobin (Hb A1C)  -     Comprehensive Metabolic Panel; Future  -     Hemoglobin A1c; Future  -     Lipid Panel; Future  -     Ambulatory Referral for Diabetic Eye Exam-Ophthalmology  -     Microalbumin / Creatinine Urine Ratio - Urine, Clean Catch; Future  -     glucose blood test strip; Use as instructed  Dispense: 100 each; Refill: 3  -     glucose monitor monitoring kit; Use 1 each Every Morning Before Breakfast.  Dispense: 1 each; Refill: 0  -     Lancet Devices misc; Use 1 each Daily.  Dispense: 100 each; Refill: 3  -     Alcohol Swabs (Alcohol Prep) pads; Use 1 each Daily.  Dispense: 100 each; Refill: 3  -     metFORMIN ER (GLUCOPHAGE-XR) 500 MG 24 hr tablet; Take 1 tablet by mouth Daily With Breakfast for 90 days.  Dispense: 30 tablet; Refill: 2  -     Insulin Glargine (LANTUS SOLOSTAR) 100 UNIT/ML injection pen; Inject 15 Units under the skin into the appropriate area as directed Every Night for 180 days.  Dispense: 27 mL; Refill: 0  -     Tirzepatide 2.5 MG/0.5ML solution auto-injector; Inject 2.5 mg under the skin into the appropriate area as directed 1 (One) Time Per Week for 28 days.  Dispense: 4 mL; Refill: 0    3. Elevated blood pressure reading   - no longer taking lisinopril, multiple elevated readings seen on chart review, patient thinks it is related to alcohol use/nausea and vomiting during one of his hospitalizations and does not believe he is hypertensive.  It does appear patient has essential hypertension, advised to log home values for review at next visit    4. Hepatic steatosis  - seen om imaging,  findings explained to patient    5. Type 2 diabetes mellitus with hyperglycemia, with long-term current use of insulin  -     Insulin Pen Needle (B-D UF III MINI PEN NEEDLES) 31G X 5 MM misc; Use 1 pen Every Night.  Dispense: 30 each; Refill: 0    6. Rash  - looks like necrobiosis lipoidica diabeticorum  - will continue to monitor, should improve with diabetic control       Follow Up:   Return in about 2 weeks (around 2/19/2025) for Recheck.    Time:   I spent approximately 60 minutes providing clinical care for this patient; including review of patient's chart and provider documentation, face to face time spent with patient in examination room (obtaining history, performing physical exam, discussing diagnosis and management options), placing orders, and completing patient documentation.     Rajan Mayberry MD  [facility]

## 2025-02-06 RX ORDER — SEMAGLUTIDE 0.68 MG/ML
0.25 INJECTION, SOLUTION SUBCUTANEOUS WEEKLY
Qty: 2 ML | Refills: 0 | Status: SHIPPED | OUTPATIENT
Start: 2025-02-06

## 2025-02-07 ENCOUNTER — PATIENT ROUNDING (BHMG ONLY) (OUTPATIENT)
Dept: INTERNAL MEDICINE | Facility: CLINIC | Age: 30
End: 2025-02-07
Payer: MEDICAID

## 2025-02-07 NOTE — PROGRESS NOTES
A My-chart message has been sent to the patient for Patient Rounding with [facility].

## 2025-02-10 ENCOUNTER — TELEPHONE (OUTPATIENT)
Dept: INTERNAL MEDICINE | Facility: CLINIC | Age: 30
End: 2025-02-10
Payer: MEDICAID

## 2025-02-10 NOTE — TELEPHONE ENCOUNTER
LVM notifying patient that I changed hs appt from 2/19 to 2/20 to the same time. Left office number and advised patient to call office back to change appt if this appt does not work.

## 2025-03-04 ENCOUNTER — TELEPHONE (OUTPATIENT)
Dept: INTERNAL MEDICINE | Facility: CLINIC | Age: 30
End: 2025-03-04
Payer: MEDICAID

## 2025-03-04 DIAGNOSIS — E55.9 VITAMIN D DEFICIENCY: Primary | ICD-10-CM

## 2025-03-04 NOTE — TELEPHONE ENCOUNTER
Called and left VM for pt to return call to office.         ----- Message from Rajan Mayberry sent at 3/4/2025 10:24 AM EST -----  -Low vitamin D; will send supplementation to his pharmacy  -Other labs significant for elevated cholesterol: advice healthy diet and exercise  - Other labs were not completed  - Encourage patient to schedule a follow-up appointment.

## 2025-03-06 ENCOUNTER — TELEPHONE (OUTPATIENT)
Dept: INTERNAL MEDICINE | Facility: CLINIC | Age: 30
End: 2025-03-06
Payer: MEDICAID

## 2025-03-06 NOTE — TELEPHONE ENCOUNTER
HUB TO READ/RELAY:    LEFT VOICEMAIL LETTING PATIENT KNOW THAT WE HAVE RESCHEDULED HIM TO A NEW PROVIDER DUE TO DR BRUCE OFFBOARDING.  HE HAS CANCELLED HIS LAST 2 FOLLOW UPS SO I SCHEDULED HIM FOR 3/11 AT 8:45 AM WITH JANE GOMES.    MAILED REMINDER.